# Patient Record
Sex: FEMALE | Race: WHITE | NOT HISPANIC OR LATINO | Employment: FULL TIME | URBAN - METROPOLITAN AREA
[De-identification: names, ages, dates, MRNs, and addresses within clinical notes are randomized per-mention and may not be internally consistent; named-entity substitution may affect disease eponyms.]

---

## 2017-11-03 ENCOUNTER — AMBULATORY - HEALTHEAST (OUTPATIENT)
Dept: MULTI SPECIALTY CLINIC | Facility: CLINIC | Age: 27
End: 2017-11-03

## 2017-11-03 LAB — PAP SMEAR - HIM PATIENT REPORTED: NORMAL

## 2018-05-03 ENCOUNTER — OFFICE VISIT - HEALTHEAST (OUTPATIENT)
Dept: FAMILY MEDICINE | Facility: CLINIC | Age: 28
End: 2018-05-03

## 2018-05-03 DIAGNOSIS — F41.1 GENERALIZED ANXIETY DISORDER: ICD-10-CM

## 2018-05-03 DIAGNOSIS — Z76.89 ESTABLISHING CARE WITH NEW DOCTOR, ENCOUNTER FOR: ICD-10-CM

## 2018-05-03 DIAGNOSIS — K58.0 IRRITABLE BOWEL SYNDROME WITH DIARRHEA: ICD-10-CM

## 2018-05-03 ASSESSMENT — MIFFLIN-ST. JEOR: SCORE: 1533.38

## 2018-05-11 ENCOUNTER — RECORDS - HEALTHEAST (OUTPATIENT)
Dept: GENERAL RADIOLOGY | Facility: CLINIC | Age: 28
End: 2018-05-11

## 2018-05-11 ENCOUNTER — OFFICE VISIT - HEALTHEAST (OUTPATIENT)
Dept: FAMILY MEDICINE | Facility: CLINIC | Age: 28
End: 2018-05-11

## 2018-05-11 ENCOUNTER — COMMUNICATION - HEALTHEAST (OUTPATIENT)
Dept: FAMILY MEDICINE | Facility: CLINIC | Age: 28
End: 2018-05-11

## 2018-05-11 DIAGNOSIS — S99.922A UNSPECIFIED INJURY OF LEFT FOOT, INITIAL ENCOUNTER: ICD-10-CM

## 2018-05-11 DIAGNOSIS — S99.922A FOOT INJURY, LEFT, INITIAL ENCOUNTER: ICD-10-CM

## 2018-06-06 ENCOUNTER — AMBULATORY - HEALTHEAST (OUTPATIENT)
Dept: NURSING | Facility: CLINIC | Age: 28
End: 2018-06-06

## 2018-06-06 ENCOUNTER — COMMUNICATION - HEALTHEAST (OUTPATIENT)
Dept: FAMILY MEDICINE | Facility: CLINIC | Age: 28
End: 2018-06-06

## 2018-06-06 DIAGNOSIS — Z11.1 VISIT FOR TB SKIN TEST: ICD-10-CM

## 2018-06-08 ENCOUNTER — AMBULATORY - HEALTHEAST (OUTPATIENT)
Dept: NURSING | Facility: CLINIC | Age: 28
End: 2018-06-08

## 2018-09-19 ENCOUNTER — RECORDS - HEALTHEAST (OUTPATIENT)
Dept: LAB | Facility: HOSPITAL | Age: 28
End: 2018-09-19

## 2018-09-20 LAB — HBV SURFACE AB SERPL IA-ACNC: POSITIVE M[IU]/ML

## 2018-09-21 LAB — VZV IGG SER QL IA: POSITIVE

## 2018-10-08 ENCOUNTER — OFFICE VISIT - HEALTHEAST (OUTPATIENT)
Dept: FAMILY MEDICINE | Facility: CLINIC | Age: 28
End: 2018-10-08

## 2018-10-08 DIAGNOSIS — F41.1 GENERALIZED ANXIETY DISORDER: ICD-10-CM

## 2018-10-08 DIAGNOSIS — R63.5 WEIGHT GAIN: ICD-10-CM

## 2018-10-08 DIAGNOSIS — F40.243 FEAR OF FLYING: ICD-10-CM

## 2019-02-19 ENCOUNTER — COMMUNICATION - HEALTHEAST (OUTPATIENT)
Dept: FAMILY MEDICINE | Facility: CLINIC | Age: 29
End: 2019-02-19

## 2019-02-24 ENCOUNTER — COMMUNICATION - HEALTHEAST (OUTPATIENT)
Dept: FAMILY MEDICINE | Facility: CLINIC | Age: 29
End: 2019-02-24

## 2019-02-26 ENCOUNTER — COMMUNICATION - HEALTHEAST (OUTPATIENT)
Dept: FAMILY MEDICINE | Facility: CLINIC | Age: 29
End: 2019-02-26

## 2019-02-26 ENCOUNTER — OFFICE VISIT - HEALTHEAST (OUTPATIENT)
Dept: FAMILY MEDICINE | Facility: CLINIC | Age: 29
End: 2019-02-26

## 2019-02-26 DIAGNOSIS — O03.9 MISCARRIAGE: ICD-10-CM

## 2019-02-26 LAB — HCG SERPL-ACNC: 15 MLU/ML (ref 0–4)

## 2019-03-05 ENCOUNTER — AMBULATORY - HEALTHEAST (OUTPATIENT)
Dept: LAB | Facility: HOSPITAL | Age: 29
End: 2019-03-05

## 2019-03-05 DIAGNOSIS — O03.9 MISCARRIAGE: ICD-10-CM

## 2019-03-05 LAB — HCG SERPL-ACNC: <2 MLU/ML (ref 0–4)

## 2019-03-11 ENCOUNTER — OFFICE VISIT - HEALTHEAST (OUTPATIENT)
Dept: FAMILY MEDICINE | Facility: CLINIC | Age: 29
End: 2019-03-11

## 2019-03-11 DIAGNOSIS — O03.9 SPONTANEOUS MISCARRIAGE: ICD-10-CM

## 2019-03-11 LAB — TSH SERPL DL<=0.005 MIU/L-ACNC: 1.91 UIU/ML (ref 0.3–5)

## 2019-03-11 ASSESSMENT — MIFFLIN-ST. JEOR: SCORE: 1641.68

## 2019-04-26 ENCOUNTER — COMMUNICATION - HEALTHEAST (OUTPATIENT)
Dept: FAMILY MEDICINE | Facility: CLINIC | Age: 29
End: 2019-04-26

## 2019-04-26 DIAGNOSIS — F41.1 GENERALIZED ANXIETY DISORDER: ICD-10-CM

## 2019-05-24 ENCOUNTER — COMMUNICATION - HEALTHEAST (OUTPATIENT)
Dept: FAMILY MEDICINE | Facility: CLINIC | Age: 29
End: 2019-05-24

## 2019-05-28 ENCOUNTER — OFFICE VISIT - HEALTHEAST (OUTPATIENT)
Dept: FAMILY MEDICINE | Facility: CLINIC | Age: 29
End: 2019-05-28

## 2019-05-28 DIAGNOSIS — F41.1 GENERALIZED ANXIETY DISORDER: ICD-10-CM

## 2019-05-28 DIAGNOSIS — R03.0 WHITE COAT SYNDROME WITHOUT HYPERTENSION: ICD-10-CM

## 2019-05-28 DIAGNOSIS — N96 HISTORY OF RECURRENT MISCARRIAGES: ICD-10-CM

## 2019-05-28 DIAGNOSIS — N92.0 SPOTTING: ICD-10-CM

## 2019-05-28 DIAGNOSIS — Z87.59 HISTORY OF MISCARRIAGE: ICD-10-CM

## 2019-05-28 DIAGNOSIS — N92.6 IRREGULAR MENSTRUAL CYCLE: ICD-10-CM

## 2019-05-28 LAB
ALBUMIN SERPL-MCNC: 3.9 G/DL (ref 3.5–5)
ALP SERPL-CCNC: 60 U/L (ref 45–120)
ALT SERPL W P-5'-P-CCNC: 18 U/L (ref 0–45)
ANION GAP SERPL CALCULATED.3IONS-SCNC: 9 MMOL/L (ref 5–18)
AST SERPL W P-5'-P-CCNC: 16 U/L (ref 0–40)
BILIRUB SERPL-MCNC: 0.2 MG/DL (ref 0–1)
BUN SERPL-MCNC: 13 MG/DL (ref 8–22)
CALCIUM SERPL-MCNC: 9.8 MG/DL (ref 8.5–10.5)
CHLORIDE BLD-SCNC: 106 MMOL/L (ref 98–107)
CO2 SERPL-SCNC: 24 MMOL/L (ref 22–31)
CREAT SERPL-MCNC: 0.7 MG/DL (ref 0.6–1.1)
GFR SERPL CREATININE-BSD FRML MDRD: >60 ML/MIN/1.73M2
GLUCOSE BLD-MCNC: 88 MG/DL (ref 70–125)
HBA1C MFR BLD: 5.1 % (ref 3.5–6)
HCG UR QL: NEGATIVE
POTASSIUM BLD-SCNC: 4.1 MMOL/L (ref 3.5–5)
PROGEST SERPL-MCNC: 0.1 NG/ML
PROT SERPL-MCNC: 6.8 G/DL (ref 6–8)
SODIUM SERPL-SCNC: 139 MMOL/L (ref 136–145)
TSH SERPL DL<=0.005 MIU/L-ACNC: 1.81 UIU/ML (ref 0.3–5)

## 2019-05-28 ASSESSMENT — MIFFLIN-ST. JEOR: SCORE: 1680.69

## 2019-05-30 LAB
ANA SER QL: 0.7 U
CARDIOLIPIN IGG SER IA-ACNC: 3.6 GPL-U/ML (ref 0–19.9)
CARDIOLIPIN IGM SER IA-ACNC: 0.2 MPL-U/ML (ref 0–19.9)
HCG SERPL-ACNC: <2 MLU/ML (ref 0–4)
PROLACTIN SERPL-MCNC: 13 NG/ML (ref 0–20)

## 2019-07-22 ENCOUNTER — OFFICE VISIT - HEALTHEAST (OUTPATIENT)
Dept: FAMILY MEDICINE | Facility: CLINIC | Age: 29
End: 2019-07-22

## 2019-07-22 DIAGNOSIS — Z32.01 PREGNANCY TEST POSITIVE: ICD-10-CM

## 2019-07-22 DIAGNOSIS — Z87.59 HISTORY OF MISCARRIAGE: ICD-10-CM

## 2019-07-22 LAB — HCG UR QL: POSITIVE

## 2019-07-23 ENCOUNTER — HOSPITAL ENCOUNTER (OUTPATIENT)
Dept: ULTRASOUND IMAGING | Facility: CLINIC | Age: 29
Discharge: HOME | End: 2019-07-23

## 2019-07-23 LAB — HCG SERPL-ACNC: ABNORMAL MLU/ML (ref 0–4)

## 2019-07-25 LAB — HCG-TM SERPL-ACNC: NORMAL IU/L

## 2020-02-07 ENCOUNTER — ANESTHESIA - HEALTHEAST (OUTPATIENT)
Dept: OBGYN | Facility: HOSPITAL | Age: 30
End: 2020-02-07

## 2020-02-12 ENCOUNTER — AMBULATORY - HEALTHEAST (OUTPATIENT)
Dept: PEDIATRICS | Facility: CLINIC | Age: 30
End: 2020-02-12

## 2020-06-27 ENCOUNTER — COMMUNICATION - HEALTHEAST (OUTPATIENT)
Dept: FAMILY MEDICINE | Facility: CLINIC | Age: 30
End: 2020-06-27

## 2020-06-27 DIAGNOSIS — F41.1 GENERALIZED ANXIETY DISORDER: ICD-10-CM

## 2020-07-11 ENCOUNTER — VIRTUAL VISIT (OUTPATIENT)
Dept: FAMILY MEDICINE | Facility: OTHER | Age: 30
End: 2020-07-11

## 2020-07-11 NOTE — PROGRESS NOTES
"Date: 2020 07:20:04  Clinician: Yolanda Nolan  Clinician NPI: [id number]  Patient: Kizzy Chan  Patient : 1990  Patient Address: [address] [place] [id number]  Patient Phone: [phone]  Visit Protocol: URI  Patient Summary:  Kizzy is a 29 year old ( : 1990 ) female who initiated a Visit for COVID-19 (Coronavirus) evaluation and screening. When asked the question \"Please sign me up to receive news, health information and promotions from [employer].\", Kizzy responded \"No\".    Kizzy states her symptoms started 1-2 days ago.   Her symptoms consist of malaise, a headache, chills, myalgia, a cough, and nasal congestion. Kizzy also feels feverish.   Symptom details     Nasal secretions: The color of her mucus is clear.    Cough: Kizzy coughs a few times an hour and her cough is not more bothersome at night. Phlegm does not come into her throat when she coughs. She does not believe her cough is caused by post-nasal drip.     Temperature: Her current temperature is 101.3 degrees Fahrenheit. Kizzy has had a temperature over 100 degrees Fahrenheit for 1-2 days.     Headache: She states the headache is moderate (4-6 on a 10 point pain scale).      Kizzy denies having wheezing, nausea, teeth pain, ageusia, diarrhea, vomiting, rhinitis, ear pain, sore throat, enlarged lymph nodes, anosmia, and facial pain or pressure. She also denies having recent facial or sinus surgery in the past 60 days, taking antibiotic medication in the past month, and having a sinus infection within the past year. She is not experiencing dyspnea.   Precipitating events  She has not recently been exposed to someone with influenza. Kizzy has been in close contact with the following high risk individuals: pregnant women, children under the age of 5, people with asthma, heart disease or diabetes, immunocompromised people, and adults 65 or older.   Pertinent COVID-19 (Coronavirus) information  In " the past 14 days, Kizzy has not worked in a congregate living setting.   She either works or volunteers as a healthcare worker or a , or works or volunteers in a healthcare facility. 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 She lives with a healthcare worker.   Kizzy has had a close contact with a laboratory-confirmed COVID-19 patient within 14 days of symptom onset. She was not exposed at her work. Additional information about contact with COVID-19 (Coronavirus) patient as reported by the patient (free text): Have been in contact with many COVID positive patients   Pertinent medical history  Kizzy does not get yeast infections when she takes antibiotics.   Kizzy needs a return to work/school note.   Weight: 212 lbs   Kizzy does not smoke or use smokeless tobacco.   She denies pregnancy and is breastfeeding. She is currently menstruating.   Weight: 212 lbs    MEDICATIONS: Prozac oral, ALLERGIES: Penicillins, ceftriaxone  Clinician Response:  Dear Kizzy,   Your symptoms show that you may have coronavirus (COVID-19). This illness can cause fever, cough and trouble breathing. Many people get a mild case and get better on their own. Some people can get very sick.  What should I do?  We would like to test you for this virus.   1. Please call [phone] to schedule your visit. Explain that you were referred by [facility] to have a COVID-19 test. Be ready to share your [facility] visit ID number.  The following will serve as your written order for this COVID Test, ordered by me, for the indication of suspected COVID [Z20.828]: The test will be ordered in [employer], our electronic health record, after you are scheduled. It will show as ordered and authorized by Martin Tapia MD.  Order: COVID-19 (Coronavirus) PCR for  "SYMPTOMATIC testing from [facility].    2. When it's time for your COVID test:  Stay at least 6 feet away from others. (If someone will drive you to your test, stay in the backseat, as far away from the  as you can.)   Cover your mouth and nose with a mask, tissue or washcloth.  Go straight to the testing site. Don't make any stops on the way there or back.    3.Starting now: Stay home and away from others (self-isolate) until:   You've had no fever---and no medicine that reduces fever---for 3 full days (72 hours). And...  Your other symptoms have gotten better. For example, your cough or breathing has improved. And...  At least 10 days have passed since your symptoms started.    During this time, don't leave the house except for testing or medical care.   Stay in your own room, even for meals. Use your own bathroom if you can.   Stay away from others in your home. No hugging, kissing or shaking hands. No visitors.  Don't go to work, school or anywhere else.    Clean \"high touch\" surfaces often (doorknobs, counters, handles, etc.). Use a household cleaning spray or wipes. You'll find a full list of  on the EPA website: www.epa.gov/pesticide-registration/list-n-disinfectants-use-against-sars-cov-2.   Cover your mouth and nose with a mask, tissue or washcloth to avoid spreading germs.  Wash your hands and face often. Use soap and water.  Caregivers in these groups are at risk for severe illness due to COVID-19:  o People 65 years and older  o People who live in a nursing home or long-term care facility  o People with chronic disease (lung, heart, cancer, diabetes, kidney, liver, immunologic)  o People who have a weakened immune system, including those who:   Are in cancer treatment  Take medicine that weakens the immune system, such as corticosteroids  Had a bone marrow or organ transplant  Have an immune deficiency  Have poorly controlled HIV or AIDS  Are obese (body mass index of 40 or higher)  Smoke " regularly   o Caregivers should wear gloves while washing dishes, handling laundry and cleaning bedrooms and bathrooms.  o Use caution when washing and drying laundry: Don't shake dirty laundry, and use the warmest water setting that you can.  o For more tips, go to www.cdc.gov/coronavirus/2019-ncov/downloads/10Things.pdf.   4.Sign up for [email or web address]. We know it's scary to hear that you might have COVID-19. We want to track your symptoms to make sure you're okay over the next 2 weeks. Please look for an email from [email or web address]---this is a free, online program that we'll use to keep in touch. To sign up, follow the link in the email. Learn more at [email or web address]  How can I take care of myself?   Get lots of rest. Drink extra fluids(unless a doctor has told you not to).  Take Tylenol (acetaminophen) for fever or pain. If you have liver or kidney problems, ask your family doctor if it's okay to take Tylenol.   Adults can take either:    650 mg (two 325 mg pills) every 4 to 6 hours, or...  1,000 mg (two 500 mg pills) every 8 hours as needed.   Note: Don't take more than 3,000 mg in one day. Acetaminophen is found in many medicines (both prescribed and over-the-counter medicines). Read all labels to be sure you don't take too much.   For children, check the Tylenol bottle for the right dose. The dose is based on the child's age or weight.    If you have other health problems (like cancer, heart failure, an organ transplant or severe kidney disease):Call your specialty clinic if you don't feel better in the next 2 days.    Know when to call 911. Emergency warning signs include:   Trouble breathing or shortness of breath Pain or pressure in the chest that doesn't go away Feeling confused like you haven't felt before, or not being able to wake up Bluish-colored lips or face.  Where can I get more information?    [facility] -- About COVID-19: [email or web address]  CDC -- What to Do If  You're Sick: www.cdc.gov/coronavirus/2019-ncov/about/steps-when-sick.html  CDC -- Ending Home Isolation: www.cdc.gov/coronavirus/2019-ncov/hcp/disposition-in-home-patients.html  [facility] -- Caring for Someone: www.cdc.gov/coronavirus/2019-ncov/if-you-are-sick/care-for-someone.html  [facility] -- Interim Guidance for Hospital Discharge to Home: www.[facility].[place]./diseases/coronavirus/hcp/hospdischarge.pdf  [facility] clinical trials (COVID-19 research studies): clinicalaffairs.[facility]-clinical-trials  Below are the COVID-19 hotlines at the [place] Department of [facility] ([facility]). Interpreters are available.    For health questions: Call 296-225-3404 or 1-909.326.4492 (7 a.m. to 7 p.m.) For questions about schools and childcare: Call [phone] or 1-786.829.6597 (7 a.m. to 7 p.m.)    Diagnosis: Cough  Diagnosis ICD: R05

## 2020-10-17 ENCOUNTER — COMMUNICATION - HEALTHEAST (OUTPATIENT)
Dept: FAMILY MEDICINE | Facility: CLINIC | Age: 30
End: 2020-10-17

## 2020-10-17 DIAGNOSIS — F41.1 GENERALIZED ANXIETY DISORDER: ICD-10-CM

## 2020-11-06 ENCOUNTER — OFFICE VISIT - HEALTHEAST (OUTPATIENT)
Dept: FAMILY MEDICINE | Facility: CLINIC | Age: 30
End: 2020-11-06

## 2020-11-06 ENCOUNTER — COMMUNICATION - HEALTHEAST (OUTPATIENT)
Dept: FAMILY MEDICINE | Facility: CLINIC | Age: 30
End: 2020-11-06

## 2020-11-06 DIAGNOSIS — Z20.822 EXPOSURE TO COVID-19 VIRUS: ICD-10-CM

## 2020-11-06 DIAGNOSIS — Z20.822 SUSPECTED COVID-19 VIRUS INFECTION: ICD-10-CM

## 2020-11-07 ENCOUNTER — RESULTS ONLY (OUTPATIENT)
Dept: LAB | Age: 30
End: 2020-11-07

## 2020-11-07 ENCOUNTER — APPOINTMENT (OUTPATIENT)
Dept: FAMILY MEDICINE | Facility: CLINIC | Age: 30
End: 2020-11-07
Payer: COMMERCIAL

## 2020-11-08 LAB
SARS-COV-2 RNA SPEC QL NAA+PROBE: ABNORMAL
SPECIMEN SOURCE: ABNORMAL

## 2021-01-21 ENCOUNTER — COMMUNICATION - HEALTHEAST (OUTPATIENT)
Dept: FAMILY MEDICINE | Facility: CLINIC | Age: 31
End: 2021-01-21

## 2021-01-21 DIAGNOSIS — F41.1 GENERALIZED ANXIETY DISORDER: ICD-10-CM

## 2021-05-14 ENCOUNTER — HOSPITAL ENCOUNTER (OUTPATIENT)
Dept: OBGYN | Facility: HOSPITAL | Age: 31
Discharge: HOME | End: 2021-05-14
Attending: OBSTETRICS & GYNECOLOGY | Admitting: OBSTETRICS & GYNECOLOGY
Payer: COMMERCIAL

## 2021-05-14 LAB — SARS-COV-2 PCR RESULT-HE - HISTORICAL: NEGATIVE

## 2021-05-15 ENCOUNTER — COMMUNICATION - HEALTHEAST (OUTPATIENT)
Dept: SCHEDULING | Facility: CLINIC | Age: 31
End: 2021-05-15

## 2021-05-17 ENCOUNTER — RECORDS - HEALTHEAST (OUTPATIENT)
Dept: ADMINISTRATIVE | Facility: OTHER | Age: 31
End: 2021-05-17

## 2021-05-17 ENCOUNTER — ANESTHESIA - HEALTHEAST (OUTPATIENT)
Dept: OBGYN | Facility: HOSPITAL | Age: 31
End: 2021-05-17

## 2021-05-28 NOTE — TELEPHONE ENCOUNTER
Refill Approved    Rx renewed per Medication Renewal Policy. Medication was last renewed on 5/3/2018.         Karlos Cage, [facility] Connection Triage/Med Refill 4/26/2019     Requested Prescriptions   Pending Prescriptions Disp Refills     FLUoxetine (PROZAC) 20 MG capsule [Pharmacy Med Name: FLUOXETINE 20MG CAPSULES] 90 capsule 0     Sig: TAKE 1 CAPSULE BY MOUTH DAILY       SSRI Refill Protocol  Passed - 4/26/2019  2:37 PM        Passed - PCP or prescribing provider visit in last year     Last office visit with prescriber/PCP: 10/8/2018 Donna Holcomb DO OR same dept: 3/11/2019 Sabina Amado MD OR same specialty: 3/11/2019 Sabina Amado MD  Last physical: Visit date not found Last MTM visit: Visit date not found   Next visit within 3 mo: Visit date not found  Next physical within 3 mo: Visit date not found  Prescriber OR PCP: Donna Holcomb DO  Last diagnosis associated with med order: There are no diagnoses linked to this encounter.  If protocol passes may refill for 12 months if within 3 months of last provider visit (or a total of 15 months).

## 2021-05-29 NOTE — TELEPHONE ENCOUNTER
Appointment is schedule with Dr Álvarez 5/30/2019, reason for visit states LMP 4/15/19 now have spotting and cramping please advise if needs triage.

## 2021-05-29 NOTE — PROGRESS NOTES
ASSESSMENT & PLAN:  Kizzy was seen today for vaginal bleeding.    Diagnoses and all orders for this visit:    Spotting  -     Pregnancy (Beta-hCG, Qual), Urine    Generalized anxiety disorder    History of miscarriage  -     Thyroid Cascade  -     Progesterone  -     Phospholipid Ab (Cardiolip) IgM/IgG  -     Comprehensive Metabolic Panel  -     Antinuclear Antibody (SHIVAM) Cascade  -     Glycosylated Hemoglobin A1c    History of recurrent miscarriages    White coat syndrome without hypertension    Irregular menstrual cycle  -     Thyroid Cascade  -     Progesterone  -     Phospholipid Ab (Cardiolip) IgM/IgG  -     Comprehensive Metabolic Panel  -     Antinuclear Antibody (SHIVAM) Cascade  -     Glycosylated Hemoglobin A1c         Patient being seen for vaginal spotting with possible missed  early trimester.  Recent history of early missed earlier in the year.  Will obtain a beta hCG.  Her urine is negative.  We discussed ordering the following labs to evaluate for risk factors for recurring early miscarriages including thyroid cascade, progesterone as she may be deficient in this although no clear studies indicate that supplementation would be helpful.  Work-up typically includes also lupus anticoagulant and cardiolipin.  There is no family history of any thrombosis or recurring miscarriages to make a suggest getting a thrombosis panel.  A1c is still stable.  We spent some time discussing diet and nutrition and exercise and weight loss is being helpful for overall health and fitness.  She has gained approximately 30 pounds over the last year so make sense to check a thyroid level.  She is a resident and we discussed how the increased stress can simply make it difficult to maintain pregnancy as well.   We are assessing for diabetes.  No other signs of PCOS.  We discussed if she is not having successful ovulation in the next several months we can do more lab testing including testosterone, DHEAS, prolactin  etc. her  came a normal semen analysis.  If she has another miscarriage I would not hesitate to check clotting factors as well as send her to OB/GYN to consider HSG.  As a mention she could consider doing vaginal or oral progesterone 400 twice daily  We discussed minimizing her sodium intake and watching the caffeine in light of the-whitecoat hypertension.  If she continues to have mild elevations I also would not hesitate to consider an aspirin 81 mg from 12 weeks on. Consider tx htn if diastolic still high 80s.   -discussed continuing prenatal.   -follow up via email or in person in 3-4 mo      Patient Instructions   Clinical guard ovulation strips from [employer]  -give yourself another normal cycle before trying again. -track your periods (length between cycles ) and days that you ovulate  -in 2 months if you want to start again do so, if you havent conceived in 3 months reach out to me and let me know  -watch sodium and caffeine intake in meantime, watch your blood pressures.   -follow up: reach out to me and we can decide on further testing.            Orders Placed This Encounter   Procedures     Pregnancy (Beta-hCG, Qual), Urine     Thyroid Cascade     Progesterone     Phospholipid Ab (Cardiolip) IgM/IgG     Comprehensive Metabolic Panel     Antinuclear Antibody (SHIVAM) Cascade     Glycosylated Hemoglobin A1c     There are no discontinued medications.    No follow-ups on file.     CHIEF COMPLAINT:  Chief Complaint   Patient presents with     Vaginal Bleeding     Possitive test on 5/23, started spotting 5/22, 5/24 started bleeding, starting to get lighter        HISTORY OF PRESENT ILLNESS:  Kizzy is a 28 y.o. female presenting to the clinic today for a possible pregnancy or miscarriage, weight gain, and hypertension.    Pregnancy/Miscarriage:The patient stopped birth control in November to try to be pregnant again. She got pregnant in January. At the end of February, she started spotting one day and next  day she is cramping. In March, she got the confirmation pregnancy from Dr. Álvarez. She tried again and went on birth control. She had her period for 2 weeks straight. She tracks her periods on her phone and would ovulate 2-3 days after Day 16 or 18. She states her periods are irregular. She would have sex every other day leading up to ovulation. She noted that before May 20th her pregnancy test was negative. [facility] were [facility]. 2 of the pregnancies were positive on the .Then on May 6th, there is spotting the week after that she ovulated. She starts the ovulation test on her own. The ovulation strips are expensive, Dr. Holcomb showed her some affordable ones on [employer].The darkest color of her period is on Day 14. Her  did a sperm analysis to investigate more on it but found none out of the ordinary. On May 24th, she was bleeding and had big clots.  When she had the miscarriage all of the pregnancy tests were positive. She ovulated for 4 weeks. There are no FHx of a clotting disorder, PCOS, or diabetes. Her mom's distant cousins had fertility issues and her Aunt just had a miscarriageShe went to the u/s 3 days after the bleeding. On April 15, she had a light period, then on , she was super heavy and had cramping. She notes that she is still bleeding now and the cramp is gone. This is Day 5 of bleeding and is getting lighter to this point. Her LMP was April 15 and her cycle is usually 25-34 days. The patient's residency is manageable and notes that next year would be harder than now. She is taking another pregnancy test a week after today.     Weight Gain: Patient has not been active. She gained 30 lbs in a year. Her  grills vegetables and he has been losing weight but she has not. In the morning she would have 2 boiled eggs and coffee (12oz), then snacks throughout the day and would have lunch and once home she would binge eat on snacks  "because she would feel famished. She has not been consuming sugar. She asked a  Question about metformin.    Hypertension: Her hypertension is high every time she is in the clinic. It is noted that her Diastolic has elevated.        Health Maintenance: There was no history of abnormal papsmear.         REVIEW OF SYSTEMS:   she denies any skin changes heart palpitations, and hair loss.  All other systems are negative.     PFSH:  Past Surgical History:   Procedure Laterality Date     CLOSED REDUCTION DISTAL RADIUS FRACTURE  03/26/2005     COLONOSCOPY  01/09/2017     Her family history includes Anxiety disorder in her brother; Breast cancer in her maternal grandmother; Cancer in her paternal grandfather; Celiac disease in her cousin and another family member; GI problems in her maternal grandfather; Heart disease in her maternal grandfather; Hyperlipidemia in her maternal grandfather; Hypertension in her maternal grandfather; Other in her maternal grandfather, maternal grandmother, and mother; Parkinsonism in her paternal grandmother; Stroke in her paternal grandfather.  She  reports that she has never smoked. She has never used smokeless tobacco.    TOBACCO USE:  Social History     Tobacco Use   Smoking Status Never Smoker   Smokeless Tobacco Never Used        VITALS:  Vitals:    05/28/19 1300   BP: 123/86   Patient Site: Left Arm   Patient Position: Sitting   Cuff Size: Adult Large   Pulse: 63   Temp: 99.1  F (37.3  C)   TempSrc: Oral   Weight: 197 lb 9.6 oz (89.6 kg)   Height: 5' 9\" (1.753 m)     Wt Readings from Last 3 Encounters:   05/28/19 197 lb 9.6 oz (89.6 kg)   03/11/19 189 lb (85.7 kg)   02/26/19 189 lb (85.7 kg)     Body mass index is 29.18 kg/m .  Patient's last menstrual period was 04/15/2019.       MEDICATIONS:  Current Outpatient Medications   Medication Sig Dispense Refill     FLUoxetine (PROZAC) 20 MG capsule TAKE 1 CAPSULE BY MOUTH DAILY 90 capsule 3     LORazepam (ATIVAN) 0.5 MG tablet Take 1 tablet " by mouth as needed for panic attack or travel 10 tablet 0     multivitamin with minerals (THERA-M) 9 mg iron-400 mcg Tab tablet Take 1 tablet by mouth daily.       norethindrone-ethinyl estradiol-iron (MICROGESTIN FE 1.5/30) 1.5 mg-30 mcg (21)/75 mg (7) per tablet Take 1 tablet by mouth daily. 84 tablet 4     No current facility-administered medications for this visit.        PHYSICAL EXAM:  GENERAL:  Reveals an alert 28 y.o. female in NAD.  Vitals:  Per nursing notes.  EYES: PERRLA. Extraocular movements intact. Normal conjunctiva and lids.   ENT:  Hearing grossly normal.  Normal appearance to ears and nose.  Bilateral TM s, external canals, oropharynx normal. Normal lips, gums and teeth.  Normal nasal mucosa, septum and turbinates.  NECK:  Supple, thyroid not palpable.  CARDIAC:  Regular rate and rhythm without murmurs, rubs, or gallops. Legs without edema. Carotids without bruits.   LUNGS: Clear.  Respiratory effort normal.  ABDOMEN:  Soft, non-tender, without hepatosplenomegaly, masses, or hernias.   SKIN:   Without rash, bruise, or palpable lesions.  NEURO:  Cranial nerves II-XII intact.     PSYCH:  Mood appropriate, memory intact.  Slightly tearful    QUALITY MEASURES:  The following are part of a depression follow up plan for the patient:  emotional support management    DATA REVIEWED:  ADDITIONAL HISTORY SUMMARIZED (2): Reviewed irregular menstrual cycle, 5/20/2019.  DECISION TO OBTAIN EXTRA INFORMATION (1): None.   RADIOLOGY TESTS (1): reviewed normal US pelvis feb 2019 (no intrauterine pregnancy)   LABS (1): Ordered Labs Today.   MEDICINE TESTS (1): None.  INDEPENDENT REVIEW (2 each): None.     The visit lasted a total of 30 minutes face to face with the patient. Over 50% of the time was spent counseling and educating the patient about possible pregnancy or miscarriage, weight gain, and hypertension.     Suze LISA, am scribing for and in the presence of Dr. Holcomb.  Dr.Abbott SLOAN, personally  performed the services described in this documentation, as scribed by Suze Maldonado in my presence, and it is both accurate and complete.          Total data points: 4

## 2021-05-29 NOTE — TELEPHONE ENCOUNTER
Who is calling:  Patient scheduled via [facility]  Reason for Call:  Patient scheduled her own appointment via [facility] for an office visit, that may need to be changed to a confirmation of pregnancy appointment.  Clinic please review scheduled appointment and contact patient if necessary to reschedule if needed.   Date of last appointment with primary care: 03.11.19  Okay to leave a detailed message: Yes

## 2021-05-29 NOTE — PATIENT INSTRUCTIONS - HE
Clinical guard ovulation strips from [facility]  -give yourself another normal cycle before trying again. -track your periods (length between cycles ) and days that you ovulate  -in 2 months if you want to start again do so, if you havent conceived in 3 months reach out to me and let me know  -watch sodium and caffeine intake in meantime, watch your blood pressures.   -follow up: reach out to me and we can decide on further testing.

## 2021-05-30 NOTE — PATIENT INSTRUCTIONS - HE
Let me know if you do not hear about your appointment in next 1-2 days    Fermin Estrada MD  7/22/2019

## 2021-05-30 NOTE — PROGRESS NOTES
Assessment:        1. Pregnancy test positive  Pregnancy, Urine    Ambulatory referral to Obstetrics / Gynecology    US OB < 14 Weeks    Beta-hCG Tumor Marker    Beta-hCG, Quantitative   2. History of miscarriage  Beta-hCG, Quantitative        Current medications, medical problems, and pregnancy history were addressed today they relate to the patient s status.      Reviewed previous visit with Dr. Holcomb.  Discussed her labs including low progesterone.  There is a mention of starting progesterone therapy.  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  She is not sure about it.    We will refer her to GYN for options.    At this time we will pursue with ultrasound as dating is not sure.  We will also obtain quantitative beta-hCG to correlate.     Plan:       The following changes were made to patient s medications None  Return in about 2 weeks (around 8/5/2019) for  with GYN.       Subjective:     Chief Complaint   Patient presents with     Follow-up     lab results/verification of pregnancy home test positive/LMP 5/23/19        Kizzy Chan is a 28 y.o. female who presents for evaluation of amenorrhea.  Pregnancy is desired.   Current contraception:  no method  Patient had a pregnancy test:  positive on 07/04/2019 at home  Current symptoms include: breast tenderness, fatigue, morning sickness, nausea and positive home pregnancy test.      No LMP recorded.  The following portions of the patient's history were reviewed and updated as appropriate: allergies, current medications, past family history, past medical history, past social history, past surgical history and problem list.    Patient Active Problem List   Diagnosis     Generalized anxiety disorder     Irritable bowel syndrome with diarrhea     Fear of flying     Disorder of refraction and accommodation     Myopia     Other acne     White coat syndrome without hypertension     History of miscarriage     Current Outpatient  Medications   Medication Sig Dispense Refill     FLUoxetine (PROZAC) 20 MG capsule TAKE 1 CAPSULE BY MOUTH DAILY 90 capsule 3     LORazepam (ATIVAN) 0.5 MG tablet Take 1 tablet by mouth as needed for panic attack or travel 10 tablet 0     multivitamin with minerals (THERA-M) 9 mg iron-400 mcg Tab tablet Take 1 tablet by mouth daily.       No current facility-administered medications for this visit.              Objective:        Vitals:    07/22/19 1630   BP: 134/79   Pulse: 87   Temp: 98.9  F (37.2  C)   TempSrc: Oral   Weight: 203 lb 6.4 oz (92.3 kg)     Physical Exam:  General Appearance: Alert, cooperative, no distress, appears stated age  Heart: Regular rate and rhythm, S1 and S2 normal, no murmur, rub, or gallop, Abdomen: Soft, non-tender, bowel sounds active all four quadrants, no masses, no organomegaly  Lungs: Clear to auscultation bilaterally, respirations unlabored  Abdomen: Soft, non-tender, bowel sounds active all four quadrants,  no masses, no organomegaly  Pelvic:Not examined  Extremities: Extremities normal, atraumatic, no cyanosis or edema  Skin: Skin color, texture, turgor normal, no rashes or lesions    Results for orders placed or performed in visit on 07/22/19   Pregnancy, Urine   Result Value Ref Range    Pregnancy Test, Urine Positive (!) Negative

## 2021-06-01 VITALS — WEIGHT: 169.7 LBS | BODY MASS INDEX: 25.24 KG/M2

## 2021-06-01 VITALS — BODY MASS INDEX: 24.59 KG/M2 | WEIGHT: 166 LBS | HEIGHT: 69 IN

## 2021-06-02 VITALS — WEIGHT: 189 LBS | BODY MASS INDEX: 27.99 KG/M2 | HEIGHT: 69 IN

## 2021-06-02 VITALS — BODY MASS INDEX: 27.58 KG/M2 | WEIGHT: 185.4 LBS

## 2021-06-02 VITALS — BODY MASS INDEX: 27.91 KG/M2 | WEIGHT: 189 LBS

## 2021-06-03 VITALS — HEIGHT: 69 IN | WEIGHT: 197.6 LBS | BODY MASS INDEX: 29.27 KG/M2

## 2021-06-03 VITALS — WEIGHT: 203.4 LBS | BODY MASS INDEX: 30.04 KG/M2

## 2021-06-05 NOTE — ANESTHESIA POSTPROCEDURE EVALUATION
Patient: Kizzy Chan  Anesthesia type: epidural    Patient location: Labor and Delivery  Last vitals:   Pulse Readings from Last 1 Encounters:   02/09/20 74     SpO2 Readings from Last 1 Encounters:   02/09/20 98%     BP Readings from Last 1 Encounters:   02/09/20 147/75     Resp Readings from Last 1 Encounters:   02/09/20 15     Temp Readings from Last 1 Encounters:   02/09/20 37.1  C (98.7  F) (Oral)       Post vital signs: stable  Level of consciousness: awake and responds to simple questions  Post-anesthesia pain: pain controlled  Post-anesthesia nausea and vomiting: no  Pulmonary: unassisted, return to baseline  Cardiovascular: stable and blood pressure at baseline  Hydration: adequate  Anesthetic events: no    QCDR Measures:  ASA# 11 - Aminta-op Cardiac Arrest: ASA11B - Patient did NOT experience unanticipated cardiac arrest  ASA# 12 - Aminta-op Mortality Rate: ASA12B - Patient did NOT die  ASA# 13 - PACU Re-Intubation Rate: NA - No ETT / LMA used for case  ASA# 10 - Composite Anes Safety: ASA10A - No serious adverse event    Additional Notes:

## 2021-06-05 NOTE — ANESTHESIA PREPROCEDURE EVALUATION
Anesthesia Evaluation      Patient summary reviewed   No history of anesthetic complications     Airway   Mallampati: II   Pulmonary - normal exam   (+) asthma                           Cardiovascular - normal exam  (+) hypertension, ,      Neuro/Psych    (+) anxiety/panic attacks,     Endo/Other    (+) pregnant     GI/Hepatic/Renal - negative ROS      Other findings: H/o gestational HTN, IBS with diarrhea.       Results for PRITESH GREENE TABATHA MONTAÑO (MRN [id number]) as of 2/7/2020 2/6/2020 16:10  Creatinine: 0.70  GFR MDRD Af Amer: >60  GFR MDRD Non Af Amer: >60  AST: 18  ALT: 21  Uric Acid: 6.2  INR: 0.96  PTT: 26  WBC: 11.1 (H)  RBC: 4.19  Hemoglobin: 12.6  Hematocrit: 37.3  MCV: 89  MCH: 30.1  MCHC: 33.8  RDW: 12.5  Platelets: 211  MPV: 12.1  ABORh: O POS  Antibody Screen: Negative        Dental                         Anesthesia Plan  Planned anesthetic: epidural    ASA 2     Anesthetic plan and risks discussed with: patient    Post-op plan: routine recovery

## 2021-06-05 NOTE — ANESTHESIA PROCEDURE NOTES
Epidural Block    Patient location during procedure: OB  Time Called: 2/7/2020 8:36 AM  Reason for Block:labor epidural  Staffing:  Performing  Anesthesiologist: Andre Gutierrez MD  Preanesthetic Checklist  Completed: patient identified, risks, benefits, and alternatives discussed, timeout performed, consent obtained, airway assessed, oxygen available, suction available, emergency drugs available and hand hygiene performed  Procedure  Patient position: right lateral decubitus  Prep: ChloraPrep  Patient monitoring: continuous pulse oximetry, heart rate and blood pressure  Approach: midline  Location: L2-L3  Epidural technique: KATHERYN to local.  Number of Attempts:1  Needle  Needle type: Ann   Needle gauge: 18 G     Catheter in Space: 5  Assessment  Sensory level:  No complicationsSensory level: not assessed at this time.

## 2021-06-06 NOTE — PROGRESS NOTES
"[facility] Lactation Visit     Assessment:     1.  difficulty in feeding at breast         Rickey has had excellent growth since hospital discharge and is now 1% above his birth weight. He has primarily been fed by bottle because of difficulties with latching.      In the office today he was able to latch well to both breasts with mom holding her breast in a deep \"sandwich\" hold and had a strong suck for 10 minutes per side. A nipple shield was not needed today. At times he was too frustrated to latch and so parents fed him a small amount of formula before attempting to latch him again. Few swallows were heard while he was at the breast. He was able to transfer 0.1 oz from the L side. Transfer was not evaluated on the R side, but I would estimate that he transferred less than 5 mls. This is less than I would expect for a 5 day infant.      Mom currently has a low milk supply and is able to pump up to 10 mls at a time. She is currently using a hospital grade pump and is pumping whenever Rickey eats, at least 8 times per day. She is currently being treated for hypertension and is taking labetalol; discussed that this medication in addition to hypertension can sometimes reduce milk supply or delay the onset of milk production.      Rickey and Kizzy will follow up with Maryann Sarmiento CNM, for follow up lactation visit in one week.      Plan:     Continue to breastfeed on demand, at least 8-12 times a day.      If he latches well to the breast right away, great. If not, try giving him a small amount of formula first then re-latch him.      Offer both sides every time, and alternate which breast you start on. Latch baby deeply by making a \"breast sandwich,\" and aim your nipple for the roof of the mouth. If baby's lips are rolled inward, flip the top lip out with your finger, and then apply gentle downward pressure to the chin to help the lips flange out like \"fish lips.\" If you have pain that lasts beyond " the initial latch-on, always restart. When sucking/swallowing frequency starts to slow down, do breast compressions/massage and tickle baby's feet to keep him alert with feeding. A diaper change between sides can be helpful to keep him alert.     Supplementation plan: Continue to supplement with expressed breast milk or formula as he cues. See chart below for typical intake by age       Recommended to pump: Whenever he eats as you are able. Balance this with other priorities like resting, sleeping, eating.  Continue to monitor output, expect at least 6 wet diapers per day.      Recommended Vitamin D 400 IU daily.         Follow up in 1 week with Maryann Sarmiento        Average Infant Milk Intake by Age     Age Average milk volume per feeding (mL) Average milk volume per feeding (oz) Average 24 hour milk intake (mL) Average 24 hour milk intake (oz)   Day 1 Few drops - 5mL < tsp Up to 30 mL Up to 1 oz   Day 2 5 - 15 mL <0.5 oz - 1 TB 30 - 120 mL 1 - 4 oz   Day 3 15 - 30 mL  0.5 - 1 oz 120 - 240 mL 4 - 8 oz   Day 4 30 - 45 mL  1 - 1.5 oz 240 - 360 mL 8 - 12 oz   Day 5-7 45 - 60 mL 1.5 - 2 oz 360 - 600 mL 12 - 18 oz   Week 2-3 60 - 90 mL 2 - 3 oz 450 - 750 mL 15 - 25 oz   Months 1-6 90 - 150 mL 3 - 5 oz 750 - 1035 mL 25 - 35 oz         -------------------------------------------------------------------------------------------------  Information for breastfeeding families on Increasing breastmilk supply      Frequent stimulation of the breasts, by breastfeeding or by using a breast pump, during the first few days and weeks, is essential to establish an abundant breastmilk supply. If you find your milk supply is low, try the following recommendations. If you are consistent you will likely see an improvement within a few days. Although it may take a month or more to bring your supply up to meet your baby's needs, you will see steady, gradual improvement. You will be glad that you put the time and effort into breastfeeding and so  "will your baby.      More breast stimulation    Breastfeed more often, at least 8-12 times per 24 hours.     Discontinue the use of a pacifier (so that when the baby wants to suck, they are stimulating the breasts for milk production)    Try to get in \"one more feeding\" before you go to sleep, even if you have to wake the baby.    Offer both breasts at each feeding    \"Burp and switch\" using each breast twice or three times, and using different positions    \"Top up feeds\" give a short feeding in 10-20 minutes if baby seems hungry    Empty your breasts well by massaging while the baby is feeding    Assure the baby is completely emptying your breasts at each feeding    Try breast compression - pushing milk to baby during a feeding     Avoid these things that are known to reduce breastmilk supply    Smoking    Caffeine    Birth control pills and injections    Decongestants, antihistamines    Severe weight loss diets    Mints, parsmartha, jatinder in excessive amounts     Use a breast pump    Consider use of a hospital grade breast pump with a double kit    Pump after feedings or between feedings    Rest 10-15 minutes prior to pumping, eat and drink something    Apply warmth to your breasts and massage before beginning to pump    Try \"power pumping\". Pumping 12 x a day for 2-3 days after a feeding, even for a short time. Try pumping for 10min, resting for 10 min, pumping 10 min etc for an hour a few times a day.      Condition your let-down reflex    Play relaxing music    Imagine your baby, look at pictures of your baby, smell baby clothing or baby powder    Watch videos of your baby    Always pump in the same quiet, relaxed place, set up a routine    Do slow, deep, relaxed breathing, relax your shoulders     Mother care    Reduce stress and activity, get help    Increase fluid intake    Eat nutritious meals, continue to take prenatal vitamins    Back rubs stimulate nerves that serve the breasts (central part of the " "spine)    Increase skin-to-skin holding time with your baby, relax together    Take a warm, bath, read,meditate, and empty your mind of tasks that need to be done     Herbs, food and medications    Eat a bowl of cooked oatmeal daily    Tom's yeast 3 Tablespoons daily, increase by 1/2 teaspoon daily until results are seen    GoLacta contains the active ingredient \"Moringa\" or \"Malunggay.\" These are superfoods than can be helpful in increasing milk supply. This herb is available through other hogan as well.     Goat's Rue is an herbal remedy intended to help increase the glandular tissue in women's breasts. This can be a powerful galactogogue (substance to increase milk supply).     Fenugreek preparations can help some increase supply, though anecdotally others have found that it does not help their supply or even decreases supply. Use of this herb has not been formally studied. Doses of 3-5 capsules (580-610 mg) three times per day are commonly recommended. Avoid fenugreek if you are diabetic, hypoglycemic, asthmatic or allergic to peanuts or other legumes or beans. Fenugreek is available at most vitamin shops or health food stores. Taken as directed, it may cause a faint maple body odor. That is to be expected and means that the herb is doing it's job. To read more about fenugreek, go to http://www.breastfeeding.com/all_about/all_about_fenugreek.html    Blessed thistle or other herbs or beverages such as Mother's Milk Tea taken as directed on the package. A reliable sources of herbs and herbal blends is Mother Love Herbals and Eleonora Herbs.    Lactation cookies. By searching the internet and you will find sources for packaged cookies and recipes to make your own.     Prescription medication sometimes help increase milk supply. Metaclopromide (Reglan) has been used with limited success. Domperidone has been used with more success, but is not FDA approved in the [place].      Keep records    It is important to keep a " daily log with the number of pumping sessions, amount obtained amount you are having to supplement your baby and 24 hour totals, this amount is more important that the pumped amount at each session. This will help you see your progress over the days.     Keep in touch with your health care provider so he/she can monitor your progress over the days and modify advice if necessary.      Retained placenta  If you are not seeing improvement and you are having any heavy bleeding, discuss the possibility of retained placental fragments with your MD. Small bits of the placenta can secrete enough hormones to prevent the milk from coming in.     Low thyroid  Have you health care provider check your thyroid levels. Low thyroid can affect ilk supply. If you have been taking thyroid medication, have your levels checked after delivery, you may need your medication adjusted.      Other resources: http://www.lowmilksupply.org     [place] Hand Expression Video http://[email or web address]/Breastfeeding/HandExpression.[email or web address]      Maximizing Milk Production Video; [email or web address]/MaxProduction.htm                        SUBJECTIVE:      Rickey is here today with mom, Kizzy, and, and dad, Lc, for lactation support. He is a 5 days male born at Gestational Age: 37w1d now 5 days.    He is doing well. He has gained 4.5 oz since last visit 3 days ago. He has gained approximately 1.5 oz per day over the past 3 days and is now 1% from birth weight.   .     Baby is attempting nursing about 4 times per day for about 1-2 minutes per session. Most of the time he has not been able to latch on or has latched very briefly.    Mother reports not hearing audible swallows.   Baby feeds about 8 times in 24 hours.   Baby is supplemented with expressed breast milk or formula, about 40 ml after feeds (approximately 8-10 times per day).   Mom is also pumping about 8+ times per day with a hospital grade and gets about 10 mls  per pumping session.  Number of wet diapers in 24 hours: 8+  Number of stools in 24 hours: 8+  Color and consistency of stools: yellow seedy  Mom noticed her breasts grew larger and areolas darkened during pregnancy and she has not yet noticed primary engorgement when her milk came in.      Breastfeeding Goals: Breastfeeding for 6 months - hoping to build a supply ahead of       Previous Breastfeeding Experience: First baby  Breast-surgery: none  Maternal medications: Labetalol, Prozac, pepsin, colace       Hospital course:  Hypoglycemia requiring supplementation           Results for orders placed or performed during the hospital encounter of 02/07/20   POCT Glucose - 90 minutes of life   Result Value Ref Range     Glucose 46 (L) 51 - 139 mg/dL   POCT Glucose   Result Value Ref Range     Glucose 36 (LL) 51 - 139 mg/dL   POCT Glucose   Result Value Ref Range     Glucose 59 51 - 139 mg/dL   POCT Glucose   Result Value Ref Range     Glucose 51 51 - 139 mg/dL   POCT Glucose   Result Value Ref Range     Glucose 54 51 - 139 mg/dL   POCT Glucose   Result Value Ref Range     Glucose 41 (L) 51 - 139 mg/dL   POCT Glucose   Result Value Ref Range     Glucose 70 51 - 139 mg/dL   POCT Glucose   Result Value Ref Range     Glucose 72 51 - 139 mg/dL   POCT Glucose   Result Value Ref Range     Glucose 63 51 - 139 mg/dL   POCT Glucose   Result Value Ref Range     Glucose 56 51 - 139 mg/dL   POCT Glucose   Result Value Ref Range     Glucose 36 (LL) 51 - 139 mg/dL   POCT Glucose   Result Value Ref Range     Glucose 56 51 - 139 mg/dL   POCT Glucose   Result Value Ref Range     Glucose 57 51 - 139 mg/dL   POCT Glucose   Result Value Ref Range     Glucose 67 51 - 139 mg/dL         Current Outpatient Medications:      white petrolatum (VASELINE) Oint, Apply liberally to circumcision site with each diaper change., Disp: , Rfl: 0     acetaminophen (TYLENOL) 40 mg/1.25 mL solution, Take 1.25 mL (40 mg total) by mouth every 6 (six)  hours as needed for fever (irritability)., Disp: , Rfl: 0  History reviewed. No pertinent past medical history.  History reviewed. No pertinent surgical history.        Family History   Problem Relation Age of Onset     Other Maternal Grandmother           Ophthalmic Disease (Copied from mother's family history at birth)     Asthma Mother           Copied from mother's history at birth     Hypertension Mother           Copied from mother's history at birth     Mental illness Mother           Copied from mother's history at birth            Primary care provider: Donna Holcomb DO     OBJECTIVE:     Mother:   Nipples are everted, the areola is compressible, the breast is soft.       Sore nipples: None   Maternal depression screening: Doing well  EPDS: Referral to maternal PCP not made     Infant:      Age today: 5 days     There were no vitals filed for this visit.        Weight:       Wt Readings from Last 3 Encounters:   02/12/20 5 lb 11.5 oz (2.594 kg) (2 %, Z= -2.04)*   02/11/20 5 lb 9 oz (2.523 kg) (2 %, Z= -2.14)*   02/09/20 5 lb 7 oz (2.466 kg) (2 %, Z= -2.13)*      * Growth percentiles are based on WHO (Boys, 0-2 years) data.         Birthweight:  5 lb 11 oz (2.58 kg).   Today's weight:    Vitals       Vitals:     02/12/20 1500   Weight: 5 lb 11.5 oz (2.594 kg)      . This is 1% from birth weight.      Test weights:     LEFT side: 0.1 oz  RIGHT side: Not evaluated - likely 0.1 oz or less      TOTAL transfer:  0.1 oz         Feeding assessment:      Digital suck assessment:  Infant draws consultant's finger into mouth, palate intact, tongue over gums, normal frenulum.      Baby can hold suction with tongue while at the breast.      Alignment: Baby's head was aligned with its trunk. Baby did face mother. Baby was in foot ball and cross cradle positions today.      Areolar Grasp: Baby was able to open mouth wide. Baby's lips were not pursed. Baby's lips did flange outward. Tongue was visible just barely  over bottom lip. Baby had complete seal.      Areolar Compression: Baby made rhythmic motion. There were no clicking or smacking sounds. There was no severe nipple discomfort.  Nipples appeared round after feeding.     Audible swallowing: Baby did not make quiet sounds of swallowing: There was not an increase in frequency after milk ejection reflex. Milk supply appears low at this time.      PHYSICAL EXAM     Gen: Alert, no acute distress.   Head: Anterior fontanelle flat and soft.   Mouth:Lips pink. Oral mucosa moist. Tongue midline (good lateralization, movement, and lift; able to extend pass lower gumline).  Palate intact. Coordinated suck.  Lungs: Clear to auscultation bilaterally.   Cardiac: Regular regular rate and rhythm, S1S2, no murmurs.  Abdomen: Soft, nontender, bowel sounds present, no hepatosplenomegaly or mass palpable. Umbilicus dry with no erythema or drainage.   : Gene stage 1 male genitalia  Skin: Intact, dry, appropriate coloring for ethnicity, no jaundice.   Neuro: Appropriate muscle tone.     The visit lasted a total of 50 minutes that I spent face to face with the patient. Of that time, 50 minutes were spent on lactation. Over 50% of the time was spent counseling and educating the patient about feeding difficulties and lactation concerns.      Completed by:   COLLEEN Page, IBCLC, [facility], Pediatrics.  2/12/2020 11:07 AM

## 2021-06-09 NOTE — TELEPHONE ENCOUNTER
Refill Approved    Rx renewed per Medication Renewal Policy. Medication was last renewed on 4/26/19.    Aurea Villaseñor, [facility] Connection Triage/Med Refill 6/29/2020     Requested Prescriptions   Pending Prescriptions Disp Refills     FLUoxetine (PROZAC) 20 MG capsule [Pharmacy Med Name: FLUOXETINE 20MG CAPSULES] 90 capsule 3     Sig: TAKE ONE CAPSULE BY MOUTH DAILY       SSRI Refill Protocol  Passed - 6/27/2020  3:28 AM        Passed - PCP or prescribing provider visit in last year     Last office visit with prescriber/PCP: 5/28/2019 Donna Holcomb DO OR same dept: 7/22/2019 Fermin Estrada MD OR same specialty: 7/22/2019 Fermin Estrada MD  Last physical: Visit date not found Last MTM visit: Visit date not found   Next visit within 3 mo: Visit date not found  Next physical within 3 mo: Visit date not found  Prescriber OR PCP: Donna Holcomb DO  Last diagnosis associated with med order: 1. Generalized anxiety disorder  - FLUoxetine (PROZAC) 20 MG capsule [Pharmacy Med Name: FLUOXETINE 20MG CAPSULES]; TAKE ONE CAPSULE BY MOUTH DAILY  Dispense: 90 capsule; Refill: 3    If protocol passes may refill for 12 months if within 3 months of last provider visit (or a total of 15 months).

## 2021-06-12 NOTE — PROGRESS NOTES
Assessment:   The encounter diagnosis was Exposure to COVID-19 virus.     Plan:   No medications were ordered this encounter    There are no Patient Instructions on file for this visit.  No follow-ups on file.    Subjective:   Kizzy Chan is a 30 y.o. female who submitted an eVisit request for evaluation of her Covid Concern.  See the questionnaire and message section of encounter report for information related to history of present illness and review of systems.    The following portions of the patient's history were reviewed and updated as appropriate:  She does not have any pertinent problems on file.  She is allergic to ceftriaxone; nickel; and penicillins..     Objective:   No exam performed today, patient submitted as eVisit.

## 2021-06-12 NOTE — TELEPHONE ENCOUNTER
RN cannot approve Refill Request    RN can NOT refill this medication PCP messaged that patient is overdue for Office Visit. Last office visit: 5/28/2019 Donna Holcomb DO Last Physical: Visit date not found Last MTM visit: Visit date not found Last visit same specialty: 7/22/2019 Fermin Estrada MD.  Next visit within 3 mo: Visit date not found  Next physical within 3 mo: Visit date not found      Madelyn Galdamez, [facility] Triage/Med Refill 10/18/2020    Requested Prescriptions   Pending Prescriptions Disp Refills     FLUoxetine (PROZAC) 20 MG capsule 90 capsule 0     Sig: Take 1 capsule (20 mg total) by mouth daily.       SSRI Refill Protocol  Failed - 10/17/2020  3:41 PM        Failed - PCP or prescribing provider visit in last year     Last office visit with prescriber/PCP: 5/28/2019 Donna Holcomb DO OR same dept: Visit date not found OR same specialty: 7/22/2019 Fermin Estrada MD  Last physical: Visit date not found Last MTM visit: Visit date not found   Next visit within 3 mo: Visit date not found  Next physical within 3 mo: Visit date not found  Prescriber OR PCP: Donna Holcomb DO  Last diagnosis associated with med order: 1. Generalized anxiety disorder  - FLUoxetine (PROZAC) 20 MG capsule; Take 1 capsule (20 mg total) by mouth daily.  Dispense: 90 capsule; Refill: 0    If protocol passes may refill for 12 months if within 3 months of last provider visit (or a total of 15 months).

## 2021-06-12 NOTE — PATIENT INSTRUCTIONS - HE
"  Dear Kizzy Chan,    Your symptoms show that you may have coronavirus (COVID-19). This illness can cause fever, cough and trouble breathing. Many people get a mild case and get better on their own. Some people can get very sick.    Will I be tested for COVID-19?  We would like to test you for this virus. I have placed an order for this test and please call [phone] to schedule testing. [employer] employees please call [phone].  [employer] employees call [phone].     When it's time for your COVID test:  Stay at least 6 feet away from others. (If someone will drive you to your test, stay in the backseat, as far away from the  as you can.)  Cover your mouth and nose with a mask, tissue or washcloth.  Go straight to the testing site. Don't make any stops on the way there or back.    Starting now:     Do not go to work. Follow your usual processes for taking time away from work.  o If you receive a negative COVID-19 test result and were NOT exposed to someone with a known positive COVID-19 test, you can return to work once you're free of fever for 24 hours without fever-reducing medication and your symptoms are improving or resolved.  o If you receive a positive COVID-19 test result, you must be cleared by Employee Occupational Health and Safety to return to work.   o If you were exposed to someone who has tested positive for COVID-19, you can return to work 14 days after your last contact with the positive individual, provided you do not have symptoms at all during that time. In some cases, your manager may ask you to come back sooner than 14 days.     During this time, don't leave the house except for testing or medical care.  o Stay in your own room, even for meals. Use your own bathroom if you can.  o Stay away from others in your home. No hugging, kissing or shaking hands. No visitors.  o Don't go to work, school or anywhere else.    Clean \"high touch\" surfaces often " (doorknobs, counters, handles, etc.). Use a household cleaning spray or wipes. You'll find a full list of  on the EPA website: www.epa.gov/pesticide-registration/list-n-disinfectants-use-against-sars-cov-2.    Cover your mouth and nose with a mask, tissue or washcloth to avoid spreading germs.    Wash your hands and face often. Use soap and water.    People in these groups are at risk for severe illness due to COVID-19:  o People 65 years and older  o People who live in a nursing home or long-term care facility  o People with chronic disease (lung, heart, cancer, diabetes, kidney, liver, immunologic)  o People who have a weakened immune system, including those who:  - Are in cancer treatment  - Take medicine that weakens the immune system, such as corticosteroids  - Had a bone marrow or organ transplant  - Have an immune deficiency  - Have poorly controlled HIV or AIDS  - Are obese (body mass index of 40 or higher)  - Smoke regularly      Caregivers should wear gloves while washing dishes, handling laundry and cleaning bedrooms and bathrooms.    Use caution when washing and drying laundry: Don't shake dirty laundry, and use the warmest water setting that you can.    For more tips, go to www.cdc.gov/coronavirus/2019-ncov/downloads/10Things.pdf.    Sign up for [employer]. We know it's scary to hear that you might have COVID-19. We want to track your symptoms to make sure you're okay over the next 2 weeks. Please look for an email from [employer]--this is a free, online program that we'll use to keep in touch. To sign up, follow the link in the email you will receive. Learn more at [email or web address]    How can I take care of myself?    Get lots of rest. Drink extra fluids (unless a doctor has told you not to)    Take Tylenol (acetaminophen) for fever or pain. If you have liver or kidney problems, ask your family doctor if it's okay to take Tylenol.  Adults can take either:    650 mg (two 325  mg pills) every 4 to 6 hours, or     1,000 mg (two 500 mg pills) every 8 hours as needed.    Note: Don't take more than 3,000 mg in one day. Acetaminophen is found in many medicines (both prescribed and over-the-counter medicines). Read all labels to be sure you don't take too much.  For children, check the Tylenol bottle for the right dose. The dose is based on the child's age or weight.    If you have other health problems (like cancer, heart failure, an organ transplant or severe kidney disease): Call your specialty clinic if you don't feel better in the next 2 days.    Know when to call 911. Emergency warning signs include:  Trouble breathing or shortness of breath  Pain or pressure in the chest that doesn't go away  Feeling confused like you haven't felt before, or not being able to wake up  Bluish-colored lips or face    Where can I get more information?    [facility] - About COVID-19: [email or web address]  CDC - What to Do If You're Sick: www.cdc.gov/coronavirus/2019-ncov/about/steps-when-sick.html

## 2021-06-14 NOTE — TELEPHONE ENCOUNTER
RN cannot approve Refill Request    RN can NOT refill this medication Protocol failed and NO refill given. Last office visit: 5/28/2019 Donna Holcomb DO Last Physical: Visit date not found Last MTM visit: Visit date not found Last visit same specialty: 7/22/2019 Fermin sEtrada MD.  Next visit within 3 mo: Visit date not found  Next physical within 3 mo: Visit date not found      Aurea Villaseñor, [facility] Triage/Med Refill 1/21/2021    Requested Prescriptions   Pending Prescriptions Disp Refills     FLUoxetine (PROZAC) 20 MG capsule [Pharmacy Med Name: FLUOXETINE 20MG CAPSULES] 90 capsule 0     Sig: TAKE 1 CAPSULE(20 MG) BY MOUTH DAILY       SSRI Refill Protocol  Failed - 1/21/2021  3:29 AM        Failed - PCP or prescribing provider visit in last year     Last office visit with prescriber/PCP: 5/28/2019 Donna Holcomb DO OR same dept: Visit date not found OR same specialty: 7/22/2019 Fermin Estrada MD  Last physical: Visit date not found Last MTM visit: Visit date not found   Next visit within 3 mo: Visit date not found  Next physical within 3 mo: Visit date not found  Prescriber OR PCP: Donna Holcomb DO  Last diagnosis associated with med order: 1. Generalized anxiety disorder  - FLUoxetine (PROZAC) 20 MG capsule [Pharmacy Med Name: FLUOXETINE 20MG CAPSULES]; TAKE 1 CAPSULE(20 MG) BY MOUTH DAILY  Dispense: 90 capsule; Refill: 0    If protocol passes may refill for 12 months if within 3 months of last provider visit (or a total of 15 months).

## 2021-06-16 PROBLEM — F40.243 FEAR OF FLYING: Status: ACTIVE | Noted: 2018-10-08

## 2021-06-16 PROBLEM — F41.1 GENERALIZED ANXIETY DISORDER: Status: ACTIVE | Noted: 2017-01-12

## 2021-06-16 PROBLEM — Z87.59 HISTORY OF MISCARRIAGE: Status: ACTIVE | Noted: 2019-05-28

## 2021-06-16 PROBLEM — R03.0 WHITE COAT SYNDROME WITHOUT HYPERTENSION: Status: ACTIVE | Noted: 2017-04-12

## 2021-06-16 PROBLEM — Z34.90 PREGNANT: Status: ACTIVE | Noted: 2020-02-06

## 2021-06-17 NOTE — PROGRESS NOTES
Please call patient:    Formal radiology review says there's linear lucency at base of 5th metatarsal could be nondisplaced fracture. Dr. Zhou has discussed this with you during visit. Make sure you wear postop shoe. F/u in 2-3 weeks for repeat XR  Please send result letter    Sabina zhou.

## 2021-06-17 NOTE — PROGRESS NOTES
ASSESSMENT/ PLAN    1. Foot injury, left, initial encounter  Occur last night. She rolled onto her lateral left ankle. Exam remarkable for tenderness to palpatation of base of left 5th metatarsal and swelling. XR obtained in clinic and per my review there's possible linear lucency across the base of 5th metatarsal could be nondisplaced fracture. Gave patient post op shoe. RTC in 2-3 weeks for repeat XR and follow up. Patient is getting  in 2 weeks. Advised ice/heat, OTC tylenol/advil for swelling/pain and consistent use of post-op shoe. Patient will be called with my recommendation as radiology report that shows possible non-displaced fracture came after patient already left clinic.   - XR Foot Left 3 or More VWS; Future  - XR Ankle Left 3 or More VWS; Future  - Shoe post op female    This note was created using Dragon dictation software, spelling errors may occur.     Sabina Amado MD        SUBJECTIVE   Kizzy Marcos is a 27 y.o. old female with a past medical history of generalized anxiety disorder, irritable bowel syndrome diarrhea predominant who presented to clinic today for further evaluation of left ankle injury.  She reports that she rolled her left foot stepping out from her basement into a garage last night around 6 PM and her left foot rolled outward and she heard a crack. She's been limping around. It hurts to bear weight. Pain is located at the base of the 5th toe. She denies previous foot injury. She used to roll her left foot all the time in high school playing basketball.     Review of Systems:  Negative except as noted in HPI    The following portions of the patient's history were reviewed and updated as appropriate: past medical history, past surgical history, family history, allergies, current medications and problem list.    Medical History  Active Ambulatory (Non-Hospital) Problems    Diagnosis     Generalized anxiety disorder     Irritable bowel syndrome with diarrhea     No past  medical history on file.    Surgical History  She  has a past surgical history that includes Colonoscopy (01/09/2017) and Closed reduction distal radius fracture (03/26/2005).    Social History  Reviewed, and she  reports that she has never smoked. She has never used smokeless tobacco.    Family History  Reviewed, and family history includes Anxiety disorder in her brother; Breast cancer in her maternal grandmother; Cancer in her paternal grandfather; Celiac disease in her cousin and other; GI problems in her maternal grandfather; Heart disease in her maternal grandfather; Hyperlipidemia in her maternal grandfather; Hypertension in her maternal grandfather; Other in her maternal grandfather, maternal grandmother, and mother; Parkinsonism in her paternal grandmother; Stroke in her paternal grandfather.    Medications  Reviewed and reconciled    Allergies  Allergies   Allergen Reactions     Ceftriaxone Rash     Had a rash possibly from this injection 9-09     Nickel Rash     Penicillins Rash         OBJECTIVE  Physical Exam:  Vital signs: /88 (Patient Site: Right Arm, Patient Position: Sitting, Cuff Size: Adult Regular)  Pulse 68  Temp 99.3  F (37.4  C) (Oral)   Resp 16  Wt 169 lb 11.2 oz (77 kg)  LMP 05/06/2018  BMI 25.24 kg/m2  Weight: 169 lb 11.2 oz (77 kg)    General appearance: pleasant, appears stated age, cooperative and in no distress  Musculoskeletal: warm and well perfused, strong and symmetric dorsalis pedal pulses, strength 5/5 and equal bilaterally  Left Ankle: skin without erythema or ecchymosis, mild to moderate effusion over the base of 5th metatarsal. Non-tender to palpation over posterior aspect of medial and lateral malleolus but exquisitely tender over the base of the 5th metarsal and navicular. Able to bear weight for four steps. Non-tender to palpation over ATFL, CFL, PTFL, deltoid ligament and tibia-fibula syndesmosis. Full ROM. Compression test normal. Anterior drawer normal. Talar  tilt normal. CMS intact.   Skin: no rashes  Neuro: alert oriented x 3, grossly normal otherwise  Psych: normal affect, appropriate conversation

## 2021-06-17 NOTE — ANESTHESIA PROCEDURE NOTES
Epidural Block    Patient location during procedure: OB  Time Called: 5/17/2021 4:18 PM  Reason for Block:labor epidural  Staffing:  Performing  Anesthesiologist: Felicia Oliva MD  Preanesthetic Checklist  Completed: patient identified, risks, benefits, and alternatives discussed, timeout performed, consent obtained, at patient's request, airway assessed, oxygen available, suction available, emergency drugs available and hand hygiene performed  Procedure  Patient position: sitting  Prep: ChloraPrep and site prepped and draped  Patient monitoring: continuous pulse oximetry, heart rate and blood pressure  Approach: midline  Location: L3-L4  Injection technique: KATHERYN saline  Number of Attempts:1  Needle  Needle type: Ann   Needle gauge: 18 G     Catheter in Space: 5  Assessment  Sensory level:  No complications

## 2021-06-17 NOTE — ANESTHESIA PREPROCEDURE EVALUATION
Anesthesia Evaluation      Patient summary reviewed     Airway    Pulmonary - normal exam    breath sounds clear to auscultation  (+) asthma    (-) not a smoker                         Cardiovascular - normal exam  (+) hypertension, ,     Rhythm: regular  Rate: normal,         Neuro/Psych    (+) anxiety/panic attacks,     Endo/Other    (+) obesity, pregnant     GI/Hepatic/Renal    (+) GERD,       Comments: IBS with diarrhea          Dental                         Anesthesia Plan  Planned anesthetic: epidural    ASA 3   Induction: intravenous   Anesthetic plan and risks discussed with: patient and spouse  Anesthesia plan special considerations: rapid sequence induction, increased risk of difficult airway, antiemetics,   Post-op plan: routine recovery

## 2021-06-17 NOTE — ANESTHESIA POSTPROCEDURE EVALUATION
"Patient: Kizzy Chan  * No procedures listed *  Anesthesia type: epidural    /82   Pulse 94   Temp 36.7  C (98  F) (Oral)   Resp 18   Ht 5' 9\" (1.753 m)   Wt (!) 242 lb (109.8 kg)   SpO2 100%   Breastfeeding Unknown   BMI 35.74 kg/m    CNS normal. No post dural puncture headache. No noted or reported complications of labor epidural. Block was one sided, but patient denied s/e of the epidural placement.  "

## 2021-06-17 NOTE — PROGRESS NOTES
ASSESSMENT & PLAN:  Kizzy was seen today for medication management.    Diagnoses and all orders for this visit:    Establishing care with new doctor, encounter for    Generalized anxiety disorder    Irritable bowel syndrome with diarrhea    Other orders  -     norethindrone-ethinyl estradiol-iron (MICROGESTIN FE 1.5/30) 1.5 mg-30 mcg (21)/75 mg (7) per tablet; Take 1 tablet by mouth daily.  -     FLUoxetine (PROZAC) 20 MG capsule; Take 1 capsule (20 mg total) by mouth daily.      Patient presents today to establish care.  No acute concerns.  Does have some degree of whitecoat hypertension as initial blood pressure was elevated but normal on repeat evaluation.  Has generalized anxiety for which fluoxetine has been working well.  Recently went up to 20 mg daily.  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  I suggested that if she is feeling this way she should reach out to me and I would have her go up to 40 mg daily and reevaluate.  I did give her and at that she can do on her phone to help with calming called Marielle since she likely and residency will not have time to attend regular psychotherapy appointments.  -Also needed a refill on her birth control which we had done.  No complications with that.  Reviewed all health history.  IBS has been stable but seems to be more related to her anxiety and typically Imodium helps    Patient Instructions   [facility] [employer] [phone] - transfer to  Ask cait            No orders of the defined types were placed in this encounter.    Medications Discontinued During This Encounter   Medication Reason     norethindrone-ethinyl estradiol-iron (MICROGESTIN FE 1.5/30) 1.5 mg-30 mcg (21)/75 mg (7) per tablet Reorder     FLUoxetine (PROZAC) 10 MG capsule Reorder       No Follow-up on file.     CHIEF COMPLAINT:  Chief Complaint   Patient presents with     Medication Management        HISTORY OF PRESENT  ILLNESS:  Kizzy is a 27 y.o. female presenting to the clinic today for an anxiety medication check. She has been dosing Fluoxetine since January 2017. She increased her fluoxetine dose to 20 mg about 2 months ago. 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 She exercises to de-stress. She denies panic attacks or weight changes. Her mom has a history of post partum depression and brother has a history of anxiety.     REVIEW OF SYSTEMS:   Resp: No shortness of breath.   Neuro: No headaches. Occasional nausea caused by anxiety.   GI: Diarrhea with gluten or dairy consumption.   Skin: No rashes.   All other systems are negative.    Little interest or pleasure in doing things: Not at all  Feeling down, depressed, or hopeless: Not at all  Trouble falling or staying asleep, or sleeping too much: Several days  Feeling tired or having little energy: Not at all  Poor appetite or overeating: More than half the days  Feeling bad about yourself - or that you are a failure or have let yourself or your family down: Not at all  Trouble concentrating on things, such as reading the newspaper or watching television: Several days  Moving or speaking so slowly that other people could have noticed. Or the opposite - being so fidgety or restless that you have been moving around a lot more than usual: Not at all  Thoughts that you would be better off dead, or of hurting yourself in some way: Not at all  PHQ-9 Total Score: 4  If you checked off any problems, how difficult have these problems made it for you to do your work, take care of things at home, or get along with other people?: Not difficult at all  No Data Recorded     PFSH:  Social: She is getting  at the end of May.   TOBACCO USE:  History   Smoking Status     Never Smoker   Smokeless Tobacco     Never Used        VITALS:  Vitals:    05/03/18 1345 05/03/18 1404   BP: 132/90 128/84   Pulse: 76    Resp: 16    Temp: 99.5  F (37.5  C)   "  TempSrc: Oral    Weight: 166 lb (75.3 kg)    Height: 5' 8.75\" (1.746 m)      Wt Readings from Last 3 Encounters:   05/03/18 166 lb (75.3 kg)     Body mass index is 24.69 kg/(m^2).  Patient's last menstrual period was 04/08/2018 (exact date).      PHYSICAL EXAM:  GENERAL:  Reveals an alert 27 y.o. female in NAD.  Vitals:  Per nursing notes.  EYES: PERRLA. Extraocular movements intact. Normal conjunctiva and lids.   ENT:  Hearing grossly normal.  Normal appearance to ears and nose.  Bilateral TM s, external canals, oropharynx normal. Normal lips, gums and teeth.  Normal nasal mucosa, septum and turbinates.  NECK:  Supple, thyroid not palpable.  CARDIAC:  Regular rate and rhythm without murmurs, rubs, or gallops. Legs without edema. Carotids without bruits.   LUNGS: Clear.  Respiratory effort normal.  ABDOMEN:  Soft, non-tender, without hepatosplenomegaly, masses, or hernias.   SKIN:   Without rash, bruise, or palpable lesions.  NEURO:  Cranial nerves II-XII intact.     PSYCH:  Mood appropriate, memory intact.  Mildly anxious      DATA REVIEWED:  ADDITIONAL HISTORY SUMMARIZED (2): Reviewed 11/3/17 note regarding normal physical exam.   DECISION TO OBTAIN EXTRA INFORMATION (1): None.   RADIOLOGY TESTS (1): None.  LABS (1): Reviewed pap from 11/3/17, which was normal.   MEDICINE TESTS (1): Administered PHQ-9 today.   INDEPENDENT REVIEW (2 each): None.     The visit lasted a total of 18 minutes face to face with the patient. Over 50% of the time was spent counseling and educating the patient about her anxiety medication.     IJodie, am scribing for and in the presence of Dr. Holcomb.  I, Dr. Donna Holcomb DO , personally performed the services described in this documentation, as scribed by Jodie Cheng in my presence, and it is both accurate and complete.     MEDICATIONS:  Current Outpatient Prescriptions   Medication Sig Dispense Refill     FLUoxetine (PROZAC) 20 MG capsule Take 1 capsule (20 mg total) by " mouth daily. 90 capsule 3     multivitamin with minerals (THERA-M) 9 mg iron-400 mcg Tab tablet Take 1 tablet by mouth daily.       norethindrone-ethinyl estradiol-iron (MICROGESTIN FE 1.5/30) 1.5 mg-30 mcg (21)/75 mg (7) per tablet Take 1 tablet by mouth daily. 84 tablet 4     No current facility-administered medications for this visit.         Total data points: 4.

## 2021-06-17 NOTE — PROGRESS NOTES
[place] [facility] [place] for [place] on 5/17/2.  COVID swab collected and sent.

## 2021-06-20 ENCOUNTER — HEALTH MAINTENANCE LETTER (OUTPATIENT)
Age: 31
End: 2021-06-20

## 2021-06-20 NOTE — PROGRESS NOTES
ASSESSMENT and PLAN:  Kizzy was seen today for medication management.    Diagnoses and all orders for this visit:    Weight gain    Generalized anxiety disorder    Fear of flying    Other orders  -     Tdap vaccine,  6yo or older,  IM  -     LORazepam (ATIVAN) 0.5 MG tablet; Take 1 tablet by mouth as needed for panic attack or travel      Patient was seen today to discuss weight gain and we reviewed that there is typically not a lot of weight gain on the fluoxetine although it is always possible.  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  We discussed that potentially the weight gain comes from her controlled anxiety when previously she was not able to maintain the calories she was taking in because either had diarrhea or vomiting from IBS.  She sounds like she is eating rather healthy but she is going to try to track it more on a consistent basis of something like my fitness pal to determine if her calorie intake is truly more than she thinks it is.  She will let me know if she wants to consider medication change and we discussed considering citalopram or venlafaxine or even Wellbutrin however Wellbutrin as monotherapy may make her anxiety worse.  She would like to have something for fear of flying and I suggested Lorazepam.  She can trial it at home to see how it makes her feel we discussed if it is not effective she can let me know prior to her travel.  Been on the ocp awhile so likely not contributing to weight gain. She would like to update her Tdap today.  She is aware and might be on leave sometime in January and so we are going to do any medication changes she will try to reach out to me before then.  We discussed safety of the medication she is on in pregnancy.  No family history of thyroid disease no other symptoms of hypothyroidism.  We discussed increased cortisol levels potentially with stress of  residency.  If she decides she wants to do lab testing we can always pend orders that she can have done while she is at the hospital.  There are no Patient Instructions on file for this visit.    Orders Placed This Encounter   Procedures     Tdap vaccine,  8yo or older,  IM     There are no discontinued medications.    No Follow-up on file.   The visit lasted a total of 20 minutes face to face with the patient. Over 50% of the time was spent counseling and educating the patient     CHIEF COMPLAINT:  Chief Complaint   Patient presents with     Medication Management     Would like to duscuss changing fluoxetine.  Feels she is gaining weight with it.  Would also like to discuss something to take for flying.  She is going to [employer].        HISTORY OF PRESENT ILLNESS:  Kizzy Chan is a 27 y.o. female presents today for evaluation of weight gain and wondering if is tied into her SSRI.   Fees like been gaining weight since started fluoxetine which was before residency.   Doesn't think eating poorly. 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 I saw her in May and has had 15lb weight gain.  No time to work out. 2 eggs for breakfast and coffee, salad bar for lunch with vinegar - teaspoon seeds. Drink tea or water. Dinner if at hospital salad bar again or chicken breast. Grills a lot at home (sauces?) Birth control hasnt changed.  in may which was the heaviest had been. Out Friday night or Saturday eats out once or twice, not a lot of alcohol. Was doing beach body on demand. Has sol which goes by portion sizes.  takes stairs. - on microgestin since 14.dose increased in med school -because of heavy period-no weight gain then.  Goal weight 160 -vomiting/ diarrhea when she weighed that weight before residency because anxiety so high.  Going to [place]- cocktails before fly sometimes because of anxiety, feels heart pounding- has 2 connections . Sister studying abroad.  Leaving for  [facility] on 17th x 1 week .     REVIEW OF SYSTEMS:    Comprehensive review of systems is negative except as noted in the HPI.     PFSH:  Tobacco use and medications reviewed below.     TOBACCO USE:  History   Smoking Status     Never Smoker   Smokeless Tobacco     Never Used       VITALS:  Vitals:    10/08/18 1400   BP: 134/82   Pulse: 71   Resp: 16   Temp: 99  F (37.2  C)   TempSrc: Oral   SpO2: 98%   Weight: 185 lb 6.4 oz (84.1 kg)     Wt Readings from Last 3 Encounters:   10/08/18 185 lb 6.4 oz (84.1 kg)   05/11/18 169 lb 11.2 oz (77 kg)   05/03/18 166 lb (75.3 kg)       PHYSICAL EXAM:  Constitutional:  Reveals a 27 y.o. female in NAD.  Vitals:  Per nursing notes.  Neck:  Supple, thyroid not palpable.  Cardiac:  Regular rate and rhythm without murmurs, rubs, or gallops. Carotids without bruits. Legs without edema. Palpation of the radial pulse regular.  Lungs: Clear.  Respiratory effort normal.  Skin:   Without rash, bruise, or palpable lesions.  Rheumatologic: Normal joints and nails of the hands.  Neurologic:  Cranial nerves II-XII intact.     Psychiatric:  Mood appropriate, memory intact.          MEDICATIONS:  Current Outpatient Prescriptions   Medication Sig Dispense Refill     FLUoxetine (PROZAC) 20 MG capsule Take 1 capsule (20 mg total) by mouth daily. 90 capsule 3     multivitamin with minerals (THERA-M) 9 mg iron-400 mcg Tab tablet Take 1 tablet by mouth daily.       norethindrone-ethinyl estradiol-iron (MICROGESTIN FE 1.5/30) 1.5 mg-30 mcg (21)/75 mg (7) per tablet Take 1 tablet by mouth daily. 84 tablet 4     LORazepam (ATIVAN) 0.5 MG tablet Take 1 tablet by mouth as needed for panic attack or travel 10 tablet 0     No current facility-administered medications for this visit.

## 2021-06-24 ENCOUNTER — RECORDS - HEALTHEAST (OUTPATIENT)
Dept: FAMILY MEDICINE | Facility: CLINIC | Age: 31
End: 2021-06-24

## 2021-06-24 DIAGNOSIS — Z02.1 ENCOUNTER FOR PRE-EMPLOYMENT EXAMINATION: ICD-10-CM

## 2021-06-24 NOTE — PROGRESS NOTES
ASSESSMENT/ PLAN    1. Spontaneous miscarriage  Patient had a lot of questions with regards to her miscarriage, further lab work, chances for future miscarriages, whether it's safe to continue current medications. I reviewed her ED visit, serial beta hcg which is now normal, discussed her medications with her. Reassured patient. 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 She plans to try to get pregnant again at some point. Her family has been supportive.   - Thyroid Grady      Greater than 25 minutes was spent today with patient ,more than 50% of time was counseling and coordination of care on the above issues    This note was created using Dragon dictation software, spelling errors may occur.     Sabina Amado MD        SUBJECTIVE   Kizzy Marcos Dustin is a 28 y.o. old female with a past medical history of CASEY who presented to clinic today for further evaluation of follow up of a recent miscarriage. She has a lot of questions about miscarriage and medications she's taking currently.     Review of Systems:  Negative except as noted in HPI    The following portions of the patient's history were reviewed and updated as appropriate: past medical history, past surgical history, family history, allergies, current medications and problem list.    Medical History  Active Ambulatory (Non-Hospital) Problems    Diagnosis     Surveillance for birth control, oral contraceptives     Fear of flying     White coat syndrome without hypertension     Generalized anxiety disorder     Failed moderate sedation during procedure     Irritable bowel syndrome with diarrhea     Disorder of refraction and accommodation     Other acne     Myopia     History reviewed. No pertinent past medical history.    Surgical History  She  has a past surgical history that includes Colonoscopy (01/09/2017) and Closed reduction distal radius fracture (03/26/2005).    Social History  Reviewed, and she  reports that  " has never smoked. she has never used smokeless tobacco.    Family History  Reviewed, and family history includes Anxiety disorder in her brother; Breast cancer in her maternal grandmother; Cancer in her paternal grandfather; Celiac disease in her cousin and another family member; GI problems in her maternal grandfather; Heart disease in her maternal grandfather; Hyperlipidemia in her maternal grandfather; Hypertension in her maternal grandfather; Other in her maternal grandfather, maternal grandmother, and mother; Parkinsonism in her paternal grandmother; Stroke in her paternal grandfather.    Medications  Reviewed and reconciled    Allergies  Allergies   Allergen Reactions     Ceftriaxone Rash     Had a rash possibly from this injection 9-09     Nickel Rash     Penicillins Rash         OBJECTIVE  Physical Exam:  Vital signs: /89 (Patient Site: Left Arm, Patient Position: Sitting, Cuff Size: Adult Regular)   Pulse 77   Resp 18   Ht 5' 9\" (1.753 m)   Wt 189 lb (85.7 kg)   SpO2 97%   BMI 27.91 kg/m    Weight: 189 lb (85.7 kg)    General appearance: pleasant, appears stated age, cooperative and in no distress  Eyes: EOMs intact, PERRL, conjunctivae normal.   ENT: Thyroid normal to palpation, no lump or mass or tenderness  Lymph: no cervical/supraclavicular adenopathy  Respiratory: clear to auscultation bilaterally, good air movement throughout, no wheezing or crackles, speaking full sentences without difficulty  Cardiovascular: regular rate and rhythm, no murmur appreciated, no leg edema  Abdomen: active bowel sounds, soft, non-tender, non-distended  Skin: no rashes  Neuro: alert oriented x 3, grossly normal otherwise  Psych: normal affect, appropriate conversation     "

## 2021-06-24 NOTE — TELEPHONE ENCOUNTER
Dr Estrada would like a phone call to be made checking on patient Tuesday morning to see how the walk in clinic visit went and if she needs anything further.

## 2021-06-24 NOTE — TELEPHONE ENCOUNTER
Orders being requested: Lab work for serial labs  Reason service is needed/diagnosis: Patient was seen at [facility] ED 2/25/19 for threatened miscarriage.  Patient states she was told to get lab work done within 48 hours by ED.    Patient was looking to get just the lab work done at [facility]'s outpatient lab this week, but no orders were placed by ED. Patient was wondering if Dr. Amado would be willing to order those orders in the system.    Patient is scheduled to see Dr. Amado 3/11 for OFV of positive pregnancy test.    When are orders needed by: asap  Where to send Orders: within system.   Okay to leave detailed message?  Yes

## 2021-06-24 NOTE — PROGRESS NOTES
ASSESSMENT & PLAN:  1. Miscarriage  Repeat quant hCG today and weekly until undetectable.  - Beta-hCG, Quantitative  - Beta-hCG, Quantitative; Standing    2. White coat HTN  Advised patient get a BP cuff to have at home and periodically monitor.       There are no Patient Instructions on file for this visit.    Orders Placed This Encounter   Procedures     Beta-hCG, Quantitative     There are no discontinued medications.    No Follow-up on file.    CHIEF COMPLAINT:  Chief Complaint   Patient presents with     Follow-up     ED visit possible threatened miscarriage        HISTORY OF PRESENT ILLNESS:  Kizzy is a 28 y.o. female presenting to the clinic today for follow up on miscarriage.  LMP 19. STarting having light spotting 19, progressed to heavier bleeding with clots and cramping on . Was seen in ER . Pelvic u/s showed no IUP, no adnexal masses. Quant hCG was 40. Hgb 14. Blood type O pos. Wet prep pos for clue cells and given rx for topical metronidazole.     REVIEW OF SYSTEMS:   Reports long h/o white coat HTN. All other systems are negative.    PFSH:  Patient Active Problem List   Diagnosis     Generalized anxiety disorder     Irritable bowel syndrome with diarrhea     Fear of flying     Disorder of refraction and accommodation     Failed moderate sedation during procedure     Myopia     Other acne     Surveillance for birth control, oral contraceptives     White coat syndrome without hypertension        TOBACCO USE:  Social History     Tobacco Use   Smoking Status Never Smoker   Smokeless Tobacco Never Used       VITALS:  Vitals:    19 1407 19 1442   BP: (!) 143/91 133/86   Patient Site: Left Arm    Patient Position: Sitting    Cuff Size: Adult Large    Pulse: 77    Resp: 16    Weight: 189 lb (85.7 kg)      Wt Readings from Last 3 Encounters:   19 189 lb (85.7 kg)   19 185 lb (83.9 kg)   10/08/18 185 lb 6.4 oz (84.1 kg)     Body mass index is 27.91  kg/m .    PHYSICAL EXAM:  GENERAL: Pleasant, well-appearing patient in no acute distress.   HEENT: Pupils equal round. Sclerae and conjunctivae clear. Oropharynx with moist mucous membranes.   NECK: Supple without lymphadenopathy   CARDIOVASCULAR: Heart regular rate and rhythm without murmur normal S1-S2   EXTREMITIES Warm and well-perfused without edema.  NEURO: Alert and oriented. Grossly nonfocal.   PSYCHIATRIC: Presents on time and well groomed. Normal speech and thought content. Full affect. No abnormal movements or behaviors noted.      MEDICATIONS:  Current Outpatient Medications   Medication Sig Dispense Refill     FLUoxetine (PROZAC) 20 MG capsule Take 1 capsule (20 mg total) by mouth daily. 90 capsule 3     LORazepam (ATIVAN) 0.5 MG tablet Take 1 tablet by mouth as needed for panic attack or travel 10 tablet 0     metroNIDAZOLE (METROGEL VAGINAL) 0.75 % vaginal gel Apply one applicatorful intravaginally once daily for 5 days. 70 g 0     multivitamin with minerals (THERA-M) 9 mg iron-400 mcg Tab tablet Take 1 tablet by mouth daily.       norethindrone-ethinyl estradiol-iron (MICROGESTIN FE 1.5/30) 1.5 mg-30 mcg (21)/75 mg (7) per tablet Take 1 tablet by mouth daily. 84 tablet 4     No current facility-administered medications for this visit.

## 2021-06-24 NOTE — TELEPHONE ENCOUNTER
Who is calling:  Patient via [employer]  Reason for Call:  Patient scheduled her own appointment in [employer] as an office visit for what appears to be a pregnancy confirmation.    Clinic please contact patient if reschedule is needed .  Date of last appointment with primary care: 10.08.2018  Has the patient been recently seen:  No  Okay to leave a detailed message: Not able to ask, scheduled via [employer]

## 2021-07-14 ENCOUNTER — LAB (OUTPATIENT)
Dept: LAB | Facility: HOSPITAL | Age: 31
End: 2021-07-14
Payer: COMMERCIAL

## 2021-07-14 DIAGNOSIS — Z02.1 ENCOUNTER FOR PRE-EMPLOYMENT EXAMINATION: ICD-10-CM

## 2021-07-14 DIAGNOSIS — Z11.1 SCREENING EXAMINATION FOR PULMONARY TUBERCULOSIS: ICD-10-CM

## 2021-07-14 PROBLEM — Z30.41 SURVEILLANCE FOR BIRTH CONTROL, ORAL CONTRACEPTIVES: Status: RESOLVED | Noted: 2019-02-25 | Resolved: 2019-05-28

## 2021-07-14 PROCEDURE — 36415 COLL VENOUS BLD VENIPUNCTURE: CPT

## 2021-07-14 PROCEDURE — 86787 VARICELLA-ZOSTER ANTIBODY: CPT

## 2021-07-14 PROCEDURE — 86481 TB AG RESPONSE T-CELL SUSP: CPT | Performed by: FAMILY MEDICINE

## 2021-07-15 LAB — VZV IGG SER QL IA: NORMAL

## 2021-07-16 LAB
QUANTIFERON MITOGEN: 10 IU/ML
QUANTIFERON NIL TUBE: 0.08 IU/ML
QUANTIFERON TB1 TUBE: 0.08 IU/ML
QUANTIFERON TB2 TUBE: 0.09

## 2021-07-19 ENCOUNTER — MEDICAL CORRESPONDENCE (OUTPATIENT)
Dept: HEALTH INFORMATION MANAGEMENT | Facility: CLINIC | Age: 31
End: 2021-07-19

## 2021-07-19 DIAGNOSIS — Z78.9 VARICELLA VACCINATION STATUS UNKNOWN: Primary | ICD-10-CM

## 2021-07-19 LAB
GAMMA INTERFERON BACKGROUND BLD IA-ACNC: 0.08 IU/ML
M TB IFN-G BLD-IMP: NEGATIVE
M TB IFN-G CD4+ BCKGRND COR BLD-ACNC: 9.92 IU/ML
MITOGEN IGNF BCKGRD COR BLD-ACNC: 0 IU/ML
MITOGEN IGNF BCKGRD COR BLD-ACNC: 0.01 IU/ML

## 2021-07-23 ENCOUNTER — LAB (OUTPATIENT)
Dept: LAB | Facility: HOSPITAL | Age: 31
End: 2021-07-23
Payer: COMMERCIAL

## 2021-07-23 DIAGNOSIS — Z78.9 VARICELLA VACCINATION STATUS UNKNOWN: ICD-10-CM

## 2021-07-23 PROCEDURE — 36415 COLL VENOUS BLD VENIPUNCTURE: CPT

## 2021-07-23 PROCEDURE — 86787 VARICELLA-ZOSTER ANTIBODY: CPT

## 2021-07-26 LAB — VZV IGG SER QL IA: NEGATIVE

## 2021-09-21 ENCOUNTER — MEDICAL CORRESPONDENCE (OUTPATIENT)
Dept: HEALTH INFORMATION MANAGEMENT | Facility: OTHER | Age: 31
End: 2021-09-21

## 2021-09-26 ENCOUNTER — ALLIED HEALTH/NURSE VISIT (OUTPATIENT)
Dept: FAMILY MEDICINE | Facility: OTHER | Age: 31
End: 2021-09-26
Attending: FAMILY MEDICINE
Payer: COMMERCIAL

## 2021-09-26 DIAGNOSIS — R05.9 COUGH: Primary | ICD-10-CM

## 2021-09-26 PROCEDURE — C9803 HOPD COVID-19 SPEC COLLECT: HCPCS

## 2021-09-26 PROCEDURE — U0003 INFECTIOUS AGENT DETECTION BY NUCLEIC ACID (DNA OR RNA); SEVERE ACUTE RESPIRATORY SYNDROME CORONAVIRUS 2 (SARS-COV-2) (CORONAVIRUS DISEASE [COVID-19]), AMPLIFIED PROBE TECHNIQUE, MAKING USE OF HIGH THROUGHPUT TECHNOLOGIES AS DESCRIBED BY CMS-2020-01-R: HCPCS | Mod: ZL

## 2021-09-27 LAB — SARS-COV-2 RNA RESP QL NAA+PROBE: NEGATIVE

## 2021-10-11 ENCOUNTER — HEALTH MAINTENANCE LETTER (OUTPATIENT)
Age: 31
End: 2021-10-11

## 2021-10-29 ENCOUNTER — ALLIED HEALTH/NURSE VISIT (OUTPATIENT)
Dept: FAMILY MEDICINE | Facility: OTHER | Age: 31
End: 2021-10-29
Attending: FAMILY MEDICINE

## 2021-10-29 DIAGNOSIS — Z91.199 FAILURE TO ATTEND APPOINTMENT: Primary | ICD-10-CM

## 2021-10-29 PROCEDURE — 91300 PR COVID VAC PFIZER DIL RECON 30 MCG/0.3 ML IM: CPT

## 2021-10-29 PROCEDURE — 0004A PR COVID VAC PFIZER DIL RECON 30 MCG/0.3 ML IM: CPT

## 2021-11-23 ENCOUNTER — MEDICAL CORRESPONDENCE (OUTPATIENT)
Dept: HEALTH INFORMATION MANAGEMENT | Facility: OTHER | Age: 31
End: 2021-11-23
Payer: COMMERCIAL

## 2021-12-23 ENCOUNTER — OFFICE VISIT (OUTPATIENT)
Dept: FAMILY MEDICINE | Facility: OTHER | Age: 31
End: 2021-12-23
Attending: FAMILY MEDICINE
Payer: COMMERCIAL

## 2021-12-23 VITALS
HEART RATE: 94 BPM | DIASTOLIC BLOOD PRESSURE: 80 MMHG | WEIGHT: 215.2 LBS | RESPIRATION RATE: 16 BRPM | TEMPERATURE: 98.1 F | SYSTOLIC BLOOD PRESSURE: 134 MMHG | OXYGEN SATURATION: 96 % | BODY MASS INDEX: 31.78 KG/M2

## 2021-12-23 DIAGNOSIS — B96.89 BV (BACTERIAL VAGINOSIS): Primary | ICD-10-CM

## 2021-12-23 DIAGNOSIS — N76.0 BV (BACTERIAL VAGINOSIS): Primary | ICD-10-CM

## 2021-12-23 DIAGNOSIS — N89.8 VAGINAL IRRITATION: ICD-10-CM

## 2021-12-23 LAB
ALBUMIN UR-MCNC: NEGATIVE MG/DL
APPEARANCE UR: CLEAR
BACTERIA #/AREA URNS HPF: ABNORMAL /HPF
BILIRUB UR QL STRIP: NEGATIVE
CLUE CELLS: PRESENT
COLOR UR AUTO: ABNORMAL
GLUCOSE UR STRIP-MCNC: NEGATIVE MG/DL
HGB UR QL STRIP: NEGATIVE
KETONES UR STRIP-MCNC: NEGATIVE MG/DL
LEUKOCYTE ESTERASE UR QL STRIP: ABNORMAL
NITRATE UR QL: NEGATIVE
PH UR STRIP: 6.5 [PH] (ref 5–9)
RBC URINE: 2 /HPF
SP GR UR STRIP: 1.01 (ref 1–1.03)
SQUAMOUS EPITHELIAL: 1 /HPF
TRICHOMONAS, WET PREP: ABNORMAL
UROBILINOGEN UR STRIP-MCNC: NORMAL MG/DL
WBC URINE: 1 /HPF
WBC'S/HIGH POWER FIELD, WET PREP: ABNORMAL
YEAST, WET PREP: ABNORMAL

## 2021-12-23 PROCEDURE — 87210 SMEAR WET MOUNT SALINE/INK: CPT | Mod: ZL | Performed by: FAMILY MEDICINE

## 2021-12-23 PROCEDURE — 81001 URINALYSIS AUTO W/SCOPE: CPT | Mod: ZL | Performed by: FAMILY MEDICINE

## 2021-12-23 PROCEDURE — 99213 OFFICE O/P EST LOW 20 MIN: CPT | Performed by: FAMILY MEDICINE

## 2021-12-23 PROCEDURE — 87086 URINE CULTURE/COLONY COUNT: CPT | Mod: ZL | Performed by: FAMILY MEDICINE

## 2021-12-23 RX ORDER — CHOLECALCIFEROL (VITAMIN D3) 50 MCG
TABLET ORAL
COMMUNITY
End: 2022-04-05

## 2021-12-23 RX ORDER — VITAMIN A ACETATE, .BETA.-CAROTENE, ASCORBIC ACID, CHOLECALCIFEROL, .ALPHA.-TOCOPHEROL ACETATE, DL-, THIAMINE MONONITRATE, RIBOFLAVIN, NIACINAMIDE, PYRIDOXINE HYDROCHLORIDE, FOLIC ACID, CYANOCOBALAMIN, CALCIUM CARBONATE, FERROUS FUMARATE, ZINC OXIDE, AND CUPRIC OXIDE 2000; 2000; 120; 400; 22; 1.84; 3; 20; 10; 1; 12; 200; 27; 25; 2 [IU]/1; [IU]/1; MG/1; [IU]/1; MG/1; MG/1; MG/1; MG/1; MG/1; MG/1; UG/1; MG/1; MG/1; MG/1; MG/1
TABLET ORAL
COMMUNITY
End: 2022-09-13

## 2021-12-23 RX ORDER — METRONIDAZOLE 500 MG/1
500 TABLET ORAL 2 TIMES DAILY
Qty: 14 TABLET | Refills: 0 | Status: SHIPPED | OUTPATIENT
Start: 2021-12-23 | End: 2021-12-30

## 2021-12-23 ASSESSMENT — ENCOUNTER SYMPTOMS
CHILLS: 0
NERVOUS/ANXIOUS: 0
FEVER: 0
DYSURIA: 1
FREQUENCY: 0

## 2021-12-23 ASSESSMENT — PAIN SCALES - GENERAL: PAINLEVEL: NO PAIN (0)

## 2021-12-23 NOTE — NURSING NOTE
Chief Complaint   Patient presents with     Vaginal Problem     Here today with concerns of possible yeast infection or BV. Discomfort, burning and itching for about 2 days now.     Medication Reconciliation: complete    Stephania Mcfarlane LPN

## 2021-12-23 NOTE — PROGRESS NOTES
SUBJECTIVE:   Nursing Notes:   Stephania Mcfarlane LPN  12/23/2021 10:32 AM  Sign at exiting of workspace  Chief Complaint   Patient presents with     Vaginal Problem     Here today with concerns of possible yeast infection or BV. Discomfort, burning and itching for about 2 days now.     Medication Reconciliation: complete    Stephania MONTAÑO. JEROME Mcfarlane        Kizzy Chan is a 31 year old female who presents to clinic today for vaginal irritation.  She has had burning and itching for the past 2 days.  2 days ago, started with vulvar itching and burning and some dysuria.  Got better, then worse again last night.  This morning is much worse.  Never has had a urinary tract infection.  Has had Bacterial Vaginosis in the past, never yeast.  Never has had symptoms like this in the past.      HPI    I personally reviewed medications/allergies/history listed below:    Patient Active Problem List    Diagnosis Date Noted     Pregnant 02/06/2020     Priority: Medium     Gestational (pregnancy-induced) hypertension without significant proteinuria, complicating childbirth 02/03/2020     Priority: Medium     History of miscarriage 05/28/2019     Priority: Medium     Fear of flying 10/08/2018     Priority: Medium     White coat syndrome without hypertension 04/12/2017     Priority: Medium     Generalized anxiety disorder 01/12/2017     Priority: Medium     Irritable bowel syndrome with diarrhea 02/18/2016     Priority: Medium     Disorder of refraction and accommodation 12/18/2013     Priority: Medium     Overview:   IMO Update         Other acne 04/12/2007     Priority: Medium     Myopia 02/28/2007     Priority: Medium     Overview:   IMO Update         Past Medical History:   Diagnosis Date     Asthma     exercise induced as child     Hypertension     Gestational HTN     Mental disorder     Anxiety on meds      Past Surgical History:   Procedure Laterality Date     CLOSED REDUCTION DISTAL RADIUS FRACTURE  03/26/2005      COLONOSCOPY  01/09/2017     Family History   Problem Relation Age of Onset     Other - See Comments Mother         Ophthalmic Disease     Other - See Comments Maternal Grandmother         Ophthalmic Disease     Breast Cancer Maternal Grandmother         77     Other - See Comments Maternal Grandfather         Ophthalmic Disease     Hyperlipidemia Maternal Grandfather      Hypertension Maternal Grandfather      GI problems Maternal Grandfather      Heart Disease Maternal Grandfather      Parkinsonism Paternal Grandmother      Cancer Paternal Grandfather      Cerebrovascular Disease Paternal Grandfather      Celiac Disease Cousin      Celiac Disease Other      Anxiety Disorder Brother      Social History     Tobacco Use     Smoking status: Never Smoker     Smokeless tobacco: Never Used   Substance Use Topics     Alcohol use: Yes     Social History     Social History Narrative    [employer] - 2/2020    [employer]en - 5/2021    Lc -  - works remotely.     Current Outpatient Medications   Medication Sig Dispense Refill     metroNIDAZOLE (FLAGYL) 500 MG tablet Take 1 tablet (500 mg) by mouth 2 times daily for 7 days 14 tablet 0     Prenatal Vit-Fe Fumarate-FA (PNV PRENATAL PLUS MULTIVITAMIN) 27-1 MG TABS per tablet        vitamin D3 (CHOLECALCIFEROL) 50 mcg (2000 units) tablet        Allergies   Allergen Reactions     Ceftriaxone Rash     Had a rash possibly from this injection 9-09     Nickel Rash     Penicillins Rash       Review of Systems   Constitutional: Negative for chills and fever.   Genitourinary: Positive for dysuria. Negative for frequency and urgency.   Psychiatric/Behavioral: The patient is not nervous/anxious.         OBJECTIVE:     /80 (BP Location: Right arm, Patient Position: Sitting, Cuff Size: Adult Regular)   Pulse 94   Temp 98.1  F (36.7  C) (Tympanic)   Resp 16   Wt 97.6 kg (215 lb 3.2 oz)   LMP  (LMP Unknown)   SpO2 96%   Breastfeeding Yes   BMI 31.78 kg/m    Body mass index  is 31.78 kg/m .  Physical Exam  Constitutional:       Appearance: Normal appearance.   HENT:      Head: Normocephalic.   Eyes:      Extraocular Movements: Extraocular movements intact.      Pupils: Pupils are equal, round, and reactive to light.   Cardiovascular:      Rate and Rhythm: Normal rate and regular rhythm.      Pulses: Normal pulses.      Heart sounds: Normal heart sounds. No murmur heard.      Pulmonary:      Effort: Pulmonary effort is normal.      Breath sounds: Normal breath sounds. No wheezing, rhonchi or rales.   Genitourinary:     Comments: Pelvic Exam:  Vulva: No external lesions, normal hair distribution, no adenopathy  Vagina: Moist, pink, no abnormal discharge, well rugated, no lesions  Cervix: wet pre is taken, parous, smooth, pink, no visible lesions.  IUD strings protruding from cervical os.  Neurological:      Mental Status: She is alert.   Psychiatric:         Mood and Affect: Mood normal.         Behavior: Behavior normal.           PHQ-2 Score:     PHQ-2 ( 1999 Pfizer) 12/23/2021   Q1: Little interest or pleasure in doing things 0   Q2: Feeling down, depressed or hopeless 0   PHQ-2 Score 0         I personally reviewed results withpatient as listed below:   Diagnostic Test Results:  Results for orders placed or performed in visit on 12/23/21 (from the past 24 hour(s))   Wet Prep, Genital    Specimen: Vagina; Swab   Result Value Ref Range    Trichomonas Absent Absent    Yeast Absent Absent    Clue Cells Present (A) Absent    WBCs/high power field 1+ (A) None   UA reflex to Microscopic   Result Value Ref Range    Color Urine Light Yellow Colorless, Straw, Light Yellow, Yellow    Appearance Urine Clear Clear    Glucose Urine Negative Negative mg/dL    Bilirubin Urine Negative Negative    Ketones Urine Negative Negative mg/dL    Specific Gravity Urine 1.015 1.000 - 1.030    Blood Urine Negative Negative    pH Urine 6.5 5.0 - 9.0    Protein Albumin Urine Negative Negative mg/dL    Urobilinogen  Urine Normal Normal, 2.0 mg/dL    Nitrite Urine Negative Negative    Leukocyte Esterase Urine Moderate (A) Negative    Bacteria Urine Few (A) None Seen /HPF    RBC Urine 2 <=2 /HPF    WBC Urine 1 <=5 /HPF    Squamous Epithelials Urine 1 <=1 /HPF       ASSESSMENT/PLAN:       ICD-10-CM    1. BV (bacterial vaginosis)  N76.0 metroNIDAZOLE (FLAGYL) 500 MG tablet    B96.89    2. Vaginal irritation  N89.8 Wet Prep, Genital     Urine Culture Aerobic Bacterial     UA reflex to Microscopic       1.  Wet prep showed clue cells.  Urinalysis relatively normal.  Urine culture pending.  Treat with Metronidazole 500 mg twice daily x 7 days.  2.  See above.    Lenora Osborne MD  [facility] AND [facility]

## 2021-12-25 LAB — BACTERIA UR CULT: NORMAL

## 2022-01-04 ENCOUNTER — OFFICE VISIT (OUTPATIENT)
Dept: FAMILY MEDICINE | Facility: OTHER | Age: 32
End: 2022-01-04
Attending: FAMILY MEDICINE
Payer: COMMERCIAL

## 2022-01-04 ENCOUNTER — HOSPITAL ENCOUNTER (OUTPATIENT)
Dept: ULTRASOUND IMAGING | Facility: OTHER | Age: 32
End: 2022-01-04
Attending: FAMILY MEDICINE
Payer: COMMERCIAL

## 2022-01-04 VITALS
RESPIRATION RATE: 16 BRPM | TEMPERATURE: 97.7 F | DIASTOLIC BLOOD PRESSURE: 94 MMHG | OXYGEN SATURATION: 98 % | SYSTOLIC BLOOD PRESSURE: 122 MMHG | HEART RATE: 75 BPM

## 2022-01-04 DIAGNOSIS — N64.3 GALACTORRHEA OF RIGHT BREAST: Primary | ICD-10-CM

## 2022-01-04 DIAGNOSIS — N64.3 GALACTORRHEA OF RIGHT BREAST: ICD-10-CM

## 2022-01-04 DIAGNOSIS — O92.70 UNSPECIFIED DISORDERS OF LACTATION: ICD-10-CM

## 2022-01-04 DIAGNOSIS — N64.52 BLOODY DISCHARGE FROM RIGHT NIPPLE: ICD-10-CM

## 2022-01-04 PROCEDURE — 99213 OFFICE O/P EST LOW 20 MIN: CPT | Performed by: FAMILY MEDICINE

## 2022-01-04 PROCEDURE — 76642 ULTRASOUND BREAST LIMITED: CPT | Mod: RT

## 2022-01-04 ASSESSMENT — ENCOUNTER SYMPTOMS
FEVER: 0
CHILLS: 0

## 2022-01-04 ASSESSMENT — PAIN SCALES - GENERAL: PAINLEVEL: NO PAIN (0)

## 2022-01-04 NOTE — PROGRESS NOTES
SUBJECTIVE:   Kizzy Chan is a 31 year old female who presents to clinic today for the following health issues:    HPI  31-year-old lactating female presents with intermittent bloody discharge from the right nipple over the past few months. Has become more persistent and that is what prompted the visit today.    Stopped nursing in October and is exclusively pumping. Is using larger cups as well as a pad due to large breasts and elongated nipple.    Non-painful bleeding    No palpable masses.  No overlying skin redness.    Mother with breast cancer in her mid 50s.      Patient Active Problem List    Diagnosis Date Noted     Pregnant 02/06/2020     Priority: Medium     Gestational (pregnancy-induced) hypertension without significant proteinuria, complicating childbirth 02/03/2020     Priority: Medium     History of miscarriage 05/28/2019     Priority: Medium     Fear of flying 10/08/2018     Priority: Medium     White coat syndrome without hypertension 04/12/2017     Priority: Medium     Generalized anxiety disorder 01/12/2017     Priority: Medium     Irritable bowel syndrome with diarrhea 02/18/2016     Priority: Medium     Disorder of refraction and accommodation 12/18/2013     Priority: Medium     Overview:   IMO Update         Other acne 04/12/2007     Priority: Medium     Myopia 02/28/2007     Priority: Medium     Overview:   IMO Update         Past Medical History:   Diagnosis Date     Asthma     exercise induced as child     Hypertension     Gestational HTN     Mental disorder     Anxiety on meds        Review of Systems   Constitutional: Negative for chills and fever.        OBJECTIVE:     BP (!) 122/94   Pulse 75   Temp 97.7  F (36.5  C) (Tympanic)   Resp 16   LMP  (LMP Unknown)   SpO2 98%   Breastfeeding Yes   There is no height or weight on file to calculate BMI.  Physical Exam  Chest:      Comments: Very large breast, right nipple irritation at 6 o'clock position pinpoint bleeding. No  palpable masses or tenderness. No LAD    No overlying skin changes.              ASSESSMENT/PLAN:           ICD-10-CM    1. Galactorrhea of right breast  N64.3 US Breast Right Limited 1-3 Quadrants     MA Diagnostic Bilateral w/Cory     CANCELED: MA Diagnostic Digital Right   2. Bloody discharge from right nipple  N64.52 US Breast Right Limited 1-3 Quadrants     MA Diagnostic Bilateral w/Cory     CANCELED: MA Diagnostic Digital Right   3. Unspecified disorders of lactation  O92.70 US Breast Right Limited 1-3 Quadrants     MA Diagnostic Bilateral w/Cory     CANCELED: MA Diagnostic Digital Right       Superficial irritation of right nipple likely source of blood however given painless, will proceed with diagnostic mammogram and ultrasound          Anitra Givens MD  [facility] AND [facility]

## 2022-01-04 NOTE — NURSING NOTE
Patient is here for concerns of bleeding while pumping. states has noticed a couple months ago, but has been having more frequently. Denies pain. Does have family hx of breast cancer.     No LMP recorded (lmp unknown).  Medication Reconciliation: complete    FOOD SECURITY SCREENING QUESTIONS  Hunger Vital Signs:  Within the past 12 months we worried whether our food would run out before we got money to buy more. Never  Within the past 12 months the food we bought just didn't last and we didn't have money to get more. Never  Love Morrissey LPN 1/4/2022 2:11 PM       Advance care directive on file? no  Advance care directive provided to patient? yes       Love Morrissey LPN

## 2022-01-07 ENCOUNTER — OFFICE VISIT (OUTPATIENT)
Dept: SURGERY | Facility: OTHER | Age: 32
End: 2022-01-07
Attending: SURGERY
Payer: COMMERCIAL

## 2022-01-07 VITALS
HEIGHT: 69 IN | TEMPERATURE: 97.7 F | OXYGEN SATURATION: 98 % | DIASTOLIC BLOOD PRESSURE: 86 MMHG | SYSTOLIC BLOOD PRESSURE: 144 MMHG | BODY MASS INDEX: 31.4 KG/M2 | HEART RATE: 70 BPM | RESPIRATION RATE: 12 BRPM | WEIGHT: 212 LBS

## 2022-01-07 DIAGNOSIS — O92.70 LACTATION PROBLEM: ICD-10-CM

## 2022-01-07 DIAGNOSIS — N64.52 BLOODY DISCHARGE FROM RIGHT NIPPLE: Primary | ICD-10-CM

## 2022-01-07 PROCEDURE — 99203 OFFICE O/P NEW LOW 30 MIN: CPT | Performed by: SURGERY

## 2022-01-07 ASSESSMENT — PAIN SCALES - GENERAL: PAINLEVEL: NO PAIN (0)

## 2022-01-07 ASSESSMENT — MIFFLIN-ST. JEOR: SCORE: 1741.01

## 2022-01-07 NOTE — NURSING NOTE
Patient presents to clinic today for concerns about blood in breast milk when she pumps from right side.  LMP - irregular about two weeks ago  Medication reconciliation completed.    ACP on file? No, form previously provided.     FOOD SECURITY SCREENING QUESTIONS    The next two questions are to help us understand your food security.  If you are feeling you need any assistance in this area, we have resources available to support you today.    Hunger Vital Signs:  Within the past 12 months we worried whether our food would run out before we got money to buy more. Never  Within the past 12 months the food we bought just didn't last and we didn't have money to get more. Never    Michelle Larios CMA(AAMA)..................1/7/2022   9:09 AM

## 2022-01-08 ENCOUNTER — LAB REQUISITION (OUTPATIENT)
Dept: LAB | Facility: OTHER | Age: 32
End: 2022-01-08

## 2022-01-08 LAB
FLUAV RNA SPEC QL NAA+PROBE: NEGATIVE
FLUBV RNA RESP QL NAA+PROBE: NEGATIVE
RSV RNA SPEC NAA+PROBE: NEGATIVE
SARS-COV-2 RNA RESP QL NAA+PROBE: NEGATIVE

## 2022-01-08 PROCEDURE — 87637 SARSCOV2&INF A&B&RSV AMP PRB: CPT | Performed by: FAMILY MEDICINE

## 2022-01-13 PROBLEM — O92.70 LACTATION PROBLEM: Status: ACTIVE | Noted: 2022-01-13

## 2022-01-13 NOTE — PROGRESS NOTES
"OFFICE CONSULTATION NOTE  Patient Name: Kizzy Chan  [address]  [place] [address]  Age:31 year old  Sex: female     Primary Care Physician: Donna Holcomb MD    I was requested to see this patient in consultation by Dr. Givens  for evaluation of right breast nipple discharge. A copy of this note will be sent to Dr. Givens.    HPI:   The patient is 31 year old female with nipple discharge on the right side. She has had this since maybe October. She is pumping to breast feed her child. At first it was occasional, then maybe once a week now it is every other day or so. Sometimes it is clotting blood in her milk, other times there is not any blood and other times the milk is just pink . No breast lumps or bumps. No bleeding if she isn't pumping but she does note a dark \"scab\" on her nipple that she can remove after she showers that is like a clot..She has not noted any new skin lesions on the breasts.    Family history of breast cancer mother at 54 and grandmother at 75-mom with negative genetic studies.  US showed no ductal filling defect, distortion or cysts.  The patient hasn't had biopsy performed.       CONSULTATIONASSESSMENT AND PLAN/RECOMMENDATIONS:   I discussed with the patient the pathophysiology of nipple discharge and breast disease. We specifically discussed that most discharge is not related to breast cancer. Discussed that this is likely some granulation tissue or inflammation from pumping. Options would be to excise the area, monitor for improvement when she done pumping. At this time will monitor. Recommend repeat US when done pumping. Recommend high risk screening protocol per NCCN guidelines including alternating breast MRI and mammograms every 6 months due to family history. Wait until 6 months after done with pumping for MRI.  The patient expressed understanding. Will check with the patient in 3 months and get US and have office visit for exam. If she has " worsening symptoms, new lump or new symptoms she will call. She had questions answered.   REVIEW OF SYSTEMS  GENERAL: No fevers or chills.Denies fatigue, recent weight loss.  HEENT: No sinus drainage. No changes with vision or hearing. No difficulty swallowing.   LYMPHATICS:  No swollen nodes in axilla, neck or groin.  CARDIOVASCULAR: Denies chestpain, palpitations and dyspnea on exertion.  PULMONARY: No shortness of breath or cough. No increase in sputum production.  GI: Denies melena, bright red blood in stools. No hematemesis. No constipation or diarrhea.  : No dysuria or hematuria.  SKIN: No recent rashes or ulcers.   HEMATOLOGY:  No history of easy bruising or bleeding.  ENDOCRINE:  No history of diabetes or thyroid problems.  NEUROLOGY:  No history ofseizures or headaches. No motor or sensory changes.  BREAST:  Denies breast pain. As above.  Past Medical History:   Diagnosis Date     Asthma     exercise induced as child     Hypertension     Gestational HTN     Mental disorder     Anxiety on meds     Past Surgical History:   Procedure Laterality Date     CLOSED REDUCTION DISTAL RADIUS FRACTURE  03/26/2005     COLONOSCOPY  01/09/2017     Current Outpatient Medications   Medication     Prenatal Vit-Fe Fumarate-FA (PNV PRENATAL PLUS MULTIVITAMIN) 27-1 MG TABS per tablet     vitamin D3 (CHOLECALCIFEROL) 50 mcg (2000 units) tablet     No current facility-administered medications for this visit.     Allergies   Allergen Reactions     Ceftriaxone Rash     Had a rash possibly from this injection 9-09     Nickel Rash     Penicillins Rash     Family History   Problem Relation Age of Onset     Other - See Comments Mother         Ophthalmic Disease     Other - See Comments Maternal Grandmother         Ophthalmic Disease     Breast Cancer Maternal Grandmother         77     Other - See Comments Maternal Grandfather         Ophthalmic Disease     Hyperlipidemia Maternal Grandfather      Hypertension Maternal Grandfather   "    GI problems Maternal Grandfather      Heart Disease Maternal Grandfather      Parkinsonism Paternal Grandmother      Cancer Paternal Grandfather      Cerebrovascular Disease Paternal Grandfather      Celiac Disease Cousin      Celiac Disease Other      Anxiety Disorder Brother      Social History     Socioeconomic History     Marital status:      Spouse name: None     Number of children: None     Years of education: None     Highest education level: None   Occupational History     None   Tobacco Use     Smoking status: Never Smoker     Smokeless tobacco: Never Used   Vaping Use     Vaping Use: Never used   Substance and Sexual Activity     Alcohol use: Yes     Comment: 2 glass wine a week     Drug use: Never     Sexual activity: Yes     Partners: Male     Birth control/protection: I.U.D.   Other Topics Concern     None   Social History Narrative    Mac - 2/2020    Coreen - 5/2021    Lc -  - works remotely.     Social Determinants of Health     Financial Resource Strain: Not on file   Food Insecurity: Not on file   Transportation Needs: Not on file   Physical Activity: Not on file   Stress: Not on file   Social Connections: Not on file   Intimate Partner Violence: Not on file   Housing Stability: Not on file     The above history was reviewed today, 1/13/2022    PHYSICAL EXAM  Vitals: BP (!) 144/86 (BP Location: Right arm, Patient Position: Sitting, Cuff Size: Adult Large)   Pulse 70   Temp 97.7  F (36.5  C) (Tympanic)   Resp 12   Ht 1.753 m (5' 9\")   Wt 96.2 kg (212 lb)   LMP 12/24/2021   SpO2 98%   Breastfeeding Yes   BMI 31.31 kg/m    GENERAL: Healthy appearing patient in no acute distress. Pleasant and cooperative with exam andinterview.   HEENT: Head-normocephalic. Eyes-no scleral icterus. Nose-no nasal drainage. No lesions. Mouth-oral mucosa pink and moist, no lesions.  NECK: Supple. No thyroid nodules. Trachea midline.  LYMPHATICS:  No cervical, axillary or " supraclavicular adenopathy.  SKIN: Pink, warm and dry. No jaundice. No rash.  NEURO:  Cranial nerves II-XIIgrossly intact. Alert and oriented.  PSYCH: Appropriate mood and affect.  BREAST: Breasts were examined in the seated and supine position. No mass noted bilaterally. No nipple changes bilaterally. NO discharge noted, 7 o'clock. Small dark ductal eschar. No tenderness noted.    IMAGING/LAB  I personally reviewed patient's recent US images and reports.

## 2022-01-13 NOTE — PATIENT INSTRUCTIONS
Let  Me know if you have changes! We will check with you in about 3 months to see how weaning is going and discuss repeat US and high risk surveillance. Please call or stop by with questions!

## 2022-02-14 ENCOUNTER — APPOINTMENT (OUTPATIENT)
Dept: LAB | Facility: OTHER | Age: 32
End: 2022-02-14
Payer: COMMERCIAL

## 2022-02-14 ENCOUNTER — TELEPHONE (OUTPATIENT)
Dept: FAMILY MEDICINE | Facility: OTHER | Age: 32
End: 2022-02-14

## 2022-02-14 DIAGNOSIS — N89.8 VAGINAL DISCHARGE: Primary | ICD-10-CM

## 2022-02-14 LAB
CLUE CELLS: ABNORMAL
TRICHOMONAS, WET PREP: ABNORMAL
WBC'S/HIGH POWER FIELD, WET PREP: ABNORMAL
YEAST, WET PREP: ABNORMAL

## 2022-02-14 PROCEDURE — 87210 SMEAR WET MOUNT SALINE/INK: CPT | Mod: ZL | Performed by: NURSE PRACTITIONER

## 2022-02-14 NOTE — TELEPHONE ENCOUNTER
Patient has vaginal discharge. Hx of recurrent BV. Obtained wet prep independently and sent to lab. NEVIN Gomez CNP on 2/14/2022 at 7:42 AM

## 2022-02-21 ENCOUNTER — OFFICE VISIT (OUTPATIENT)
Dept: FAMILY MEDICINE | Facility: OTHER | Age: 32
End: 2022-02-21
Attending: FAMILY MEDICINE
Payer: COMMERCIAL

## 2022-02-21 VITALS
TEMPERATURE: 97.5 F | DIASTOLIC BLOOD PRESSURE: 84 MMHG | BODY MASS INDEX: 31.58 KG/M2 | WEIGHT: 213.2 LBS | OXYGEN SATURATION: 100 % | SYSTOLIC BLOOD PRESSURE: 124 MMHG | HEIGHT: 69 IN | HEART RATE: 87 BPM

## 2022-02-21 DIAGNOSIS — R05.9 COUGH: Primary | ICD-10-CM

## 2022-02-21 DIAGNOSIS — F33.9 EPISODE OF RECURRENT MAJOR DEPRESSIVE DISORDER, UNSPECIFIED DEPRESSION EPISODE SEVERITY (H): ICD-10-CM

## 2022-02-21 DIAGNOSIS — R03.0 ELEVATED BLOOD PRESSURE READING WITHOUT DIAGNOSIS OF HYPERTENSION: ICD-10-CM

## 2022-02-21 DIAGNOSIS — F41.9 ANXIETY: ICD-10-CM

## 2022-02-21 PROBLEM — F41.1 ANXIETY STATE: Status: ACTIVE | Noted: 2022-02-21

## 2022-02-21 PROBLEM — T88.52XA FAILED MODERATE SEDATION DURING PROCEDURE: Status: ACTIVE | Noted: 2017-01-09

## 2022-02-21 PROBLEM — Z30.430 ENCOUNTER FOR INSERTION OF COPPER INTRAUTERINE CONTRACEPTIVE DEVICE (IUD): Status: ACTIVE | Noted: 2021-06-30

## 2022-02-21 PROBLEM — N96 RECURRENT PREGNANCY LOSS: Status: ACTIVE | Noted: 2022-02-21

## 2022-02-21 PROBLEM — Z87.59 HISTORY OF GESTATIONAL HYPERTENSION: Status: ACTIVE | Noted: 2022-02-21

## 2022-02-21 PROCEDURE — 99214 OFFICE O/P EST MOD 30 MIN: CPT | Performed by: FAMILY MEDICINE

## 2022-02-21 PROCEDURE — 87637 SARSCOV2&INF A&B&RSV AMP PRB: CPT | Performed by: FAMILY MEDICINE

## 2022-02-21 ASSESSMENT — ANXIETY QUESTIONNAIRES
5. BEING SO RESTLESS THAT IT IS HARD TO SIT STILL: NOT AT ALL
7. FEELING AFRAID AS IF SOMETHING AWFUL MIGHT HAPPEN: SEVERAL DAYS
2. NOT BEING ABLE TO STOP OR CONTROL WORRYING: SEVERAL DAYS
6. BECOMING EASILY ANNOYED OR IRRITABLE: NEARLY EVERY DAY
GAD7 TOTAL SCORE: 10
IF YOU CHECKED OFF ANY PROBLEMS ON THIS QUESTIONNAIRE, HOW DIFFICULT HAVE THESE PROBLEMS MADE IT FOR YOU TO DO YOUR WORK, TAKE CARE OF THINGS AT HOME, OR GET ALONG WITH OTHER PEOPLE: SOMEWHAT DIFFICULT
4. TROUBLE RELAXING: SEVERAL DAYS
3. WORRYING TOO MUCH ABOUT DIFFERENT THINGS: SEVERAL DAYS
1. FEELING NERVOUS, ANXIOUS, OR ON EDGE: NEARLY EVERY DAY

## 2022-02-21 ASSESSMENT — PATIENT HEALTH QUESTIONNAIRE - PHQ9: SUM OF ALL RESPONSES TO PHQ QUESTIONS 1-9: 12

## 2022-02-21 ASSESSMENT — PAIN SCALES - GENERAL: PAINLEVEL: NO PAIN (0)

## 2022-02-21 NOTE — PATIENT INSTRUCTIONS
Restart Prozac 20 mg.  Consider counseling - EAP - employee assistance program.    Will call with multiplex results.    Monitor BP - goal <130/80.  Reduce caffeine.  Start exercise program.    Follow up 1-2 months - recheck BP and reassess mood on Prozac.    Nice to meet you!

## 2022-02-21 NOTE — PROGRESS NOTES
"  Assessment & Plan     Cough  Residual mild cough, hoarse voice; multiplex obtained  - Symptomatic; Yes; 2/14/2022 Influenza A/B & SARS-CoV2 (COVID-19) Virus PCR Multiplex Nose; Future  - Symptomatic; Yes; 2/14/2022 Influenza A/B & SARS-CoV2 (COVID-19) Virus PCR Multiplex Nasopharyngeal    Anxiety  Desires to restart Prozac.   Is at the tail end of nursing/pumping.   Encouraged to consider counseling -EAP at work?  - FLUoxetine (PROZAC) 20 MG capsule; Take 1 capsule (20 mg) by mouth daily    Episode of recurrent major depressive disorder, unspecified depression episode severity (H)  As above  - FLUoxetine (PROZAC) 20 MG capsule; Take 1 capsule (20 mg) by mouth daily    Elevated blood pressure reading without diagnosis of hypertension  Lifestyle modifications - exercise routine, coffee reduction, weight management.  Reassess at 2 month for med check/  If >130/80 - start medication likely.          BMI:   Estimated body mass index is 31.48 kg/m  as calculated from the following:    Height as of this encounter: 1.753 m (5' 9\").    Weight as of this encounter: 96.7 kg (213 lb 3.2 oz).   Weight management plan: Discussed healthy diet and exercise guidelines    Patient Instructions   Restart Prozac 20 mg.  Consider counseling - EAP - employee assistance program.    Will call with multiplex results.    Monitor BP - goal <130/80.  Reduce caffeine.  Start exercise program.    Follow up 1-2 months - recheck BP and reassess mood on Prozac.    Nice to meet you!            Return in about 2 months (around 4/21/2022) for depression/anxiety; BP recheck.    Aviva Ozuna MD  [facility] - JANESSA Durand is a 31 year old who presents for the following health issues     HPI     Establish care - TREVOR álvarez.  Patient is physician at [employer].    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  Multiplex negative.  Asthma as a child - outgrown.  No recent antibiotics.  Non-smoker.  Fully vaccinated - flu and covid.    Hypertension Follow-up - gestational only    Do you check your blood pressure regularly outside of the clinic? No     Are you following a low salt diet? No    Are your blood pressures ever more than 140 on the top number (systolic) OR more   than 90 on the bottom number (diastolic), for example 140/90? Yes   Usual 130s-140s SBP in office  Pregnancy - pre-eclampsia with 1st; borderline with 2nd   - 2 miscarriage  Prior Labetolol.  Baby Aspirin.  Kids ages - son 2 daughter 9 months  IUD in place  Fam Hx - negative; maternal grandpa - CAD/heart isues  Lots of caffeine - 30 ounces per day.  No exercise.  Downloaded Noom.    Depression and Anxiety Follow-Up    How are you doing with your depression since your last visit? Worsened     How are you doing with your anxiety since your last visit?  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 No    Have you had a significant life event? OTHER: job change     Do you have any concerns with your use of alcohol or other drugs? 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; off x few months    2 kids    No side effects    Started job 10/2021 - [employer]    Alcohol - social    No active counseling    Weaning down on nursing - pumping - done in next week or 2    Social History     Tobacco Use      "Smoking status: Never Smoker     Smokeless tobacco: Never Used   Vaping Use     Vaping Use: Never used   Substance Use Topics     Alcohol use: Yes     Comment: 2 glass wine a week     Drug use: Never     PHQ 2/21/2022   PHQ-9 Total Score 12   Q9: Thoughts of better off dead/self-harm past 2 weeks Not at all     CASEY-7 SCORE 2/21/2022   Total Score 10     Last PHQ-9 2/21/2022   1.  Little interest or pleasure in doing things 0   2.  Feeling down, depressed, or hopeless 2   3.  Trouble falling or staying asleep, or sleeping too much 2   4.  Feeling tired or having little energy 3   5.  Poor appetite or overeating 3   6.  Feeling bad about yourself 0   7.  Trouble concentrating 2   8.  Moving slowly or restless 0   Q9: Thoughts of better off dead/self-harm past 2 weeks 0   PHQ-9 Total Score 12   Difficulty at work, home, or with people Somewhat difficult     CASEY-7  2/21/2022   1. Feeling nervous, anxious, or on edge 3   2. Not being able to stop or control worrying 1   3. Worrying too much about different things 1   4. Trouble relaxing 1   5. Being so restless that it is hard to sit still 0   6. Becoming easily annoyed or irritable 3   7. Feeling afraid, as if something awful might happen 1   CASEY-7 Total Score 10   If you checked any problems, how difficult have they made it for you to do your work, take care of things at home, or get along with other people? Somewhat difficult       Family history breast cancer -   Mom and maternal grandma - different types.  Negative genetic testing for both.  Breast surgeon advised alternating q6  mammo and MRI starting 3 months after pumping.    Review of Systems   Constitutional, HEENT, cardiovascular, pulmonary, gi and gu systems are negative, except as otherwise noted.      Objective    /84 (BP Location: Right arm, Patient Position: Chair, Cuff Size: Adult Regular)   Pulse 87   Temp 97.5  F (36.4  C) (Tympanic)   Ht 1.753 m (5' 9\")   Wt 96.7 kg (213 lb 3.2 oz)   SpO2 " 100%   BMI 31.48 kg/m    Body mass index is 31.48 kg/m .  Physical Exam   GENERAL: healthy, alert and no distress  GENERAL: raspy voice  EYES: Eyes grossly normal to inspection, PERRL and conjunctivae and sclerae normal  HENT: ear canals and TM's normal, nose and mouth without ulcers or lesions  NECK: no adenopathy, no asymmetry, masses, or scars and thyroid normal to palpation  RESP: lungs clear to auscultation - no rales, rhonchi or wheezes  CV: regular rate and rhythm, normal S1 S2, no S3 or S4, no murmur, click or rub, no peripheral edema and peripheral pulses strong  ABDOMEN: soft, nontender, no hepatosplenomegaly, no masses and bowel sounds normal  MS: no gross musculoskeletal defects noted, no edema  PSYCH: mentation appears normal, affect normal/bright

## 2022-02-22 ASSESSMENT — ANXIETY QUESTIONNAIRES: GAD7 TOTAL SCORE: 10

## 2022-04-05 ENCOUNTER — OFFICE VISIT (OUTPATIENT)
Dept: SURGERY | Facility: OTHER | Age: 32
End: 2022-04-05
Attending: SURGERY
Payer: COMMERCIAL

## 2022-04-05 VITALS
WEIGHT: 214 LBS | OXYGEN SATURATION: 97 % | DIASTOLIC BLOOD PRESSURE: 88 MMHG | HEART RATE: 82 BPM | BODY MASS INDEX: 31.6 KG/M2 | RESPIRATION RATE: 16 BRPM | TEMPERATURE: 98.1 F | SYSTOLIC BLOOD PRESSURE: 138 MMHG

## 2022-04-05 DIAGNOSIS — Z80.3 FAMILY HISTORY OF BREAST CANCER IN MOTHER: Primary | ICD-10-CM

## 2022-04-05 PROBLEM — O92.70 LACTATION PROBLEM: Status: RESOLVED | Noted: 2022-01-13 | Resolved: 2022-04-05

## 2022-04-05 PROBLEM — Z34.90 PREGNANT: Status: RESOLVED | Noted: 2020-02-06 | Resolved: 2022-04-05

## 2022-04-05 PROCEDURE — 99214 OFFICE O/P EST MOD 30 MIN: CPT | Performed by: SURGERY

## 2022-04-05 ASSESSMENT — PAIN SCALES - GENERAL: PAINLEVEL: NO PAIN (0)

## 2022-04-05 NOTE — PROGRESS NOTES
Primary Care Physician: Donna Holcomb MD      HPI:   Patient is here for follow up. The patient is 31 year old female with previous right bloody nipple discharge while she was lactating. She has stopped pumping and the bleeding has stopped. She isn't having breast tenderness or pain. No new lumps or bumps. She stopped pumping the last week of February.    She has a significant family history of breast cancer with her mother and maternal grandmother both having breast cancer.    ASSESSMENT AND PLAN/RECOMMENDATIONS:  Bloody nipple discharge - resolved  Family history of breast cancer  I discussed with the patient that family history can increase her lifetime risk for breast cancer. Her JUAN ANTONIO model estimates her lifetime risk over 30%. We discussed current NCCN guidelines for high risk screening and surveillance in patients with a greater than 20 % lifetime risk of breast cancer. At this time will hold off on genetic referral as her mother was tested and was negative. The patient expressed understanding and denies further questions at this time. Will order screening mammogram for May and follow that with MRI 6 months later, depending on findings. We discussed alterations to the timing and plans if/when she gets pregnant. The patient will call with questions or concerns.           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 nursing     Hypertension     Gestational HTN     Mental disorder     Anxiety on meds      Past Surgical History:   Procedure Laterality Date     CLOSED REDUCTION DISTAL RADIUS FRACTURE  03/26/2005     COLONOSCOPY  01/09/2017     Family History   Problem Relation Age of Onset     Other - See Comments Mother         Ophthalmic Disease     Other - See Comments Maternal Grandmother         Ophthalmic Disease     Breast Cancer Maternal Grandmother          77     Other - See Comments Maternal Grandfather         Ophthalmic Disease     Hyperlipidemia Maternal Grandfather      Hypertension Maternal Grandfather      GI problems Maternal Grandfather      Heart Disease Maternal Grandfather      Parkinsonism Paternal Grandmother      Cancer Paternal Grandfather      Cerebrovascular Disease Paternal Grandfather      Celiac Disease Cousin      Celiac Disease Other      Anxiety Disorder Brother      Social History     Socioeconomic History     Marital status:      Spouse name: None     Number of children: None     Years of education: None     Highest education level: None   Occupational History     None   Tobacco Use     Smoking status: Never Smoker     Smokeless tobacco: Never Used   Vaping Use     Vaping Use: Never used   Substance and Sexual Activity     Alcohol use: Yes     Alcohol/week: 2.0 standard drinks     Types: 2 Glasses of wine per week     Comment: 2 glass wine a week     Drug use: Never     Sexual activity: Yes     Partners: Male     Birth control/protection: I.U.D.   Other Topics Concern     None   Social History Narrative    Mac - 2/2020    Coreen - 5/2021    Lc -  - works remotely.     Social Determinants of Health     Financial Resource Strain: Not on file   Food Insecurity: Not on file   Transportation Needs: Not on file   Physical Activity: Not on file   Stress: Not on file   Social Connections: Not on file   Intimate Partner Violence: Not on file   Housing Stability: Not on file     Current Outpatient Medications   Medication     FLUoxetine (PROZAC) 20 MG capsule     Prenatal Vit-Fe Fumarate-FA (PNV PRENATAL PLUS MULTIVITAMIN) 27-1 MG TABS per tablet     No current facility-administered medications for this visit.     Allergies   Allergen Reactions     Ceftriaxone Rash     Had a rash possibly from this injection 9-09     Nickel Rash     Penicillins Rash     REVIEW OF SYSTEMS  GENERAL: No fevers or chills. Denies  fatigue, recent weight loss.  HEENT: No sinus drainage. No changes with vision or hearing. No difficulty swallowing.   LYMPHATICS:  No swollen nodes in axilla, neck or groin.  CARDIOVASCULAR: Denies chest pain, palpitations and dyspnea on exertion.  PULMONARY: No shortness of breath or cough. No increase in sputum production.  GI: Denies melena, bright red blood in stools. No hematemesis. No constipation or diarrhea.  : No dysuria or hematuria.  SKIN: No recent rashes or ulcers.   HEMATOLOGY:  No history of easy bruising or bleeding.  ENDOCRINE:  No history of diabetes or thyroid problems.  NEUROLOGY:  No history of seizures or headaches. No motor or sensory changes.    PHYSICAL EXAM  Vitals: /88 (BP Location: Right arm, Patient Position: Sitting, Cuff Size: Adult Large)   Pulse 82   Temp 98.1  F (36.7  C) (Tympanic)   Resp 16   Wt 97.1 kg (214 lb)   SpO2 97%   BMI 31.60 kg/m    GENERAL: Healthy appearing patient in no acute distress. Pleasant and cooperative with exam and interview.   HEENT:Head-normocephalic. Eyes-no scleral icterus. Nose-no nasal drainage. No lesions. Mouth-oral mucosa pink and moist, no lesions.  NECK: Supple. No thyroid nodules. Trachea midline.  SKIN: Pink, warm and dry. No jaundice. No rash.  NEURO:  Cranial nerves II-XII grossly intact. Alert and oriented.  PSYCH: Appropriate mood and affect.    IMAGING/LAB  none

## 2022-04-05 NOTE — NURSING NOTE
"Chief Complaint   Patient presents with     RECHECK     f/u breast issues       Initial /88 (BP Location: Right arm, Patient Position: Sitting, Cuff Size: Adult Large)   Pulse 82   Temp 98.1  F (36.7  C) (Tympanic)   Resp 16   Wt 97.1 kg (214 lb)   SpO2 97%   BMI 31.60 kg/m   Estimated body mass index is 31.6 kg/m  as calculated from the following:    Height as of 2/21/22: 1.753 m (5' 9\").    Weight as of this encounter: 97.1 kg (214 lb).  Medication Reconciliation: complete    Tejal Grace LPN    "

## 2022-04-05 NOTE — PATIENT INSTRUCTIONS
You will get a reminder to schedule mammogram in May. 6 months after that will get letter for screening MRI due to lifetime risk of breast cancer more than 20%. Please call if you note any changes or have concerns or questions before that time.

## 2022-04-30 ENCOUNTER — OFFICE VISIT (OUTPATIENT)
Dept: FAMILY MEDICINE | Facility: OTHER | Age: 32
End: 2022-04-30
Attending: PHYSICIAN ASSISTANT
Payer: COMMERCIAL

## 2022-04-30 VITALS
WEIGHT: 210.8 LBS | RESPIRATION RATE: 16 BRPM | TEMPERATURE: 97.8 F | SYSTOLIC BLOOD PRESSURE: 122 MMHG | DIASTOLIC BLOOD PRESSURE: 86 MMHG | OXYGEN SATURATION: 97 % | BODY MASS INDEX: 31.13 KG/M2 | HEART RATE: 84 BPM

## 2022-04-30 DIAGNOSIS — R19.5 LOOSE STOOLS: Primary | ICD-10-CM

## 2022-04-30 LAB
C DIFF TOX B STL QL: NEGATIVE
FLUAV RNA SPEC QL NAA+PROBE: NEGATIVE
FLUBV RNA RESP QL NAA+PROBE: NEGATIVE
RIBOTYPE 027/NAP1/BI: NORMAL
RSV RNA SPEC NAA+PROBE: NEGATIVE
SARS-COV-2 RNA RESP QL NAA+PROBE: NEGATIVE

## 2022-04-30 PROCEDURE — 87506 IADNA-DNA/RNA PROBE TQ 6-11: CPT | Mod: ZL | Performed by: PHYSICIAN ASSISTANT

## 2022-04-30 PROCEDURE — 87449 NOS EACH ORGANISM AG IA: CPT | Mod: ZL | Performed by: PHYSICIAN ASSISTANT

## 2022-04-30 PROCEDURE — 87637 SARSCOV2&INF A&B&RSV AMP PRB: CPT | Mod: ZL | Performed by: PHYSICIAN ASSISTANT

## 2022-04-30 PROCEDURE — 99213 OFFICE O/P EST LOW 20 MIN: CPT | Performed by: PHYSICIAN ASSISTANT

## 2022-04-30 PROCEDURE — C9803 HOPD COVID-19 SPEC COLLECT: HCPCS | Performed by: PHYSICIAN ASSISTANT

## 2022-04-30 PROCEDURE — 87209 SMEAR COMPLEX STAIN: CPT | Mod: ZL | Performed by: PHYSICIAN ASSISTANT

## 2022-04-30 PROCEDURE — 87493 C DIFF AMPLIFIED PROBE: CPT | Mod: ZL | Performed by: PHYSICIAN ASSISTANT

## 2022-04-30 ASSESSMENT — PAIN SCALES - GENERAL: PAINLEVEL: NO PAIN (0)

## 2022-04-30 NOTE — PROGRESS NOTES
ASSESSMENT/PLAN:    I have reviewed the nursing notes.  I have reviewed the findings, diagnosis, plan and need for follow up with the patient.    1. Loose stools  - Symptomatic; Yes; 4/26/2022 Influenza A/B & SARS-CoV2 (COVID-19) Virus PCR Multiplex Nose  - Clostridium difficile Toxin B PCR  - Enteric Bacteria and Virus Panel by [facility] Stool  - Adenovirus antigen, Stool  - Ova and Parasite Exam Routine  - Vitals signs stable. Examination available for review below. Multiplex swab performed due to possible work place exposure. Will also send home stool sample kit for collection - will return as soon as possible. OK to drop off in [facility] between business hours of 10 am-8 pm, otherwise OK to drop off in ER if after hours. Anticipate up to 1-3 day turn around time on labs. Will update on results as available. If COVID positive 5 day quarantine from symptom onset, fever free for 24 hours (without fever reducers - Tylenol/NSAIDs) prior to return to work. Push fluids, continue with symptomatic remedies of care. Follow up with high fevers (103-104 F), abdominal pain, worsening of symptoms/signs of dehydration. Patient is in agreement and understanding of the above treatment plan. All questions and concerns were addressed and answered to patient's satisfaction. AVS reviewed with patient.     Discussed warning signs/symptoms indicative of need to f/u    Follow up if symptoms persist or worsen or concerns    I explained my diagnostic considerations and recommendations to the patient, who voiced understanding and agreement with the treatment plan. All questions were answered. We discussed potential side effects of any prescribed or recommended therapies, as well as expectations for response to treatments.    Kelli Mcgrath PA-C  4/30/2022  3:08 PM    HPI:    Carlitoscorriesusan SABRA Hemant Chan is a 31 year old female  who presents to [facility] today for concerns of URI, x Tuesday (3-4 days). Watering stools over last 2 days. No  blood in stools. No recent antibiotic use.     Symptoms:  No fevers, but does note chills. No fevers  No sore throat/pharyngitis/tonsillitis.   No allergy/URI Symptoms  No Muffled Sounds/Change in Hearing  No Sensation of Fullness in Ear(s)  No Ringing in Ears/Tinnitus  No Balance Changes  No Dizziness  No Congestion (head/nasal/chest)  No Cough/Productive Cough  No Post Nasal Drip  No Headache  No Sinus Pain/Pressure  Yes Myalgias  No Otalgia  Activity Level Changes: Yes: tired  Appetite/Liquid Intake Changes: Yes: decreased solids. Trying to force solids down.   Changes to Bowel Habits: Yes: diarrhea  Changes to Bladder Habits: Yes: decreased due to decreased intake   Additional Symptoms to Report: Yes: vomiting   History of similar symptoms: No  Prior workup: No    Treatments tried: Imodium    Negative home COVID test x 3 days prior.     Site of exposure: work and kids in .   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     Hypertension     Gestational HTN     Mental disorder     Anxiety on meds     Past Surgical History:   Procedure Laterality Date     CLOSED REDUCTION DISTAL RADIUS FRACTURE  03/26/2005     COLONOSCOPY  01/09/2017     Social History     Tobacco Use     Smoking status: Never Smoker     Smokeless tobacco: Never Used   Substance Use Topics     Alcohol use: Yes     Alcohol/week: 2.0 standard drinks     Types: 2 Glasses of wine per week     Comment: 2 glass wine a week     Current Outpatient Medications   Medication Sig Dispense Refill     FLUoxetine (PROZAC) 20 MG capsule Take 1 capsule (20 mg) by mouth daily 90 capsule 1     Prenatal Vit-Fe Fumarate-FA (PNV PRENATAL PLUS MULTIVITAMIN)  27-1 MG TABS per tablet        Allergies   Allergen Reactions     Ceftriaxone Rash     Had a rash possibly from this injection 9-09     Nickel Rash     Penicillins Rash     Past medical history, past surgical history, current medications and allergies reviewed and accurate to the best of my knowledge.      ROS:  Refer to HPI    /86 (BP Location: Right arm, Patient Position: Sitting, Cuff Size: Adult Regular)   Pulse 84   Temp 97.8  F (36.6  C) (Temporal)   Resp 16   Wt 95.6 kg (210 lb 12.8 oz)   LMP  (LMP Unknown)   SpO2 97%   Breastfeeding No   BMI 31.13 kg/m      EXAM:  General Appearance: Well appearing 31 year old female, appropriate appearance for age. No acute distress   Ears: Left TM intact, translucent with bony landmarks appreciated, no erythema, no effusion, no bulging, no purulence.  Right TM intact, translucent with bony landmarks appreciated, no erythema, no effusion, no bulging, no purulence.  Left auditory canal clear.  Right auditory canal clear.  Normal external ears, non tender.  Eyes: conjunctivae normal without erythema or irritation, corneas clear, no drainage or crusting, no eyelid swelling, pupils equal   Oropharynx: moist mucous membranes, posterior pharynx without erythema, tonsils symmetric, no erythema, no exudates or petechiae, no post nasal drip seen, no trismus, voice clear.    Sinuses:  No sinus tenderness upon palpation of the frontal or maxillary sinuses  Nose:  Bilateral nares: no erythema, no edema, no drainage or congestion   Neck: supple without adenopathy  Respiratory: normal chest wall and respirations.  Normal effort.  Clear to auscultation bilaterally, no wheezing, crackles or rhonchi.  No increased work of breathing.  No cough appreciated.  Cardiac: RRR with no murmurs  Abdomen: soft, nontender, no rigidity, no rebound tenderness or guarding, normal bowel sounds present  :  No suprapubic tenderness to palpation.  Absent CVA tenderness to palpation.     Dermatological: no rashes noted of exposed skin  Psychological: normal affect, alert, oriented, and pleasant.     Labs:  Multiplex in process  Stool samples - send home    Xray:  None

## 2022-04-30 NOTE — PATIENT INSTRUCTIONS
Will update on stool cultures as available. Anticipate up to 1-3 day turn around times on labs.     Multiplex will take 1-2 hours to return. Will update on results as available. 5 day quarantine from symptom onset if COVID positive (also recommend to be fever free for 24 hours without fever reducers). If RSV, Influenza positive recommend 3-5 day quarantine from symptom onset.

## 2022-04-30 NOTE — NURSING NOTE
"Chief Complaint   Patient presents with     Flu     Flu like symptoms; vomiting, diarrhea       Initial /86 (BP Location: Right arm, Patient Position: Sitting, Cuff Size: Adult Regular)   Pulse 84   Temp 97.8  F (36.6  C) (Temporal)   Resp 16   Wt 95.6 kg (210 lb 12.8 oz)   LMP  (LMP Unknown)   SpO2 97%   Breastfeeding No   BMI 31.13 kg/m   Estimated body mass index is 31.13 kg/m  as calculated from the following:    Height as of 2/21/22: 1.753 m (5' 9\").    Weight as of this encounter: 95.6 kg (210 lb 12.8 oz).     Medication Reconciliation: complete      FOOD SECURITY SCREENING QUESTIONS:    The next two questions are to help us understand your food security.  If you are feeling you need any assistance in this area, we have resources available to support you today.    Hunger Vital Signs:  Within the past 12 months we worried whether our food would run out before we got money to buy more. Never  Within the past 12 months the food we bought just didn't last and we didn't have money to get more. Never      Advance care plan reviewed      Yamilex Jon LPN on 4/30/2022 at 3:06 PM      "

## 2022-05-01 LAB
C COLI+JEJUNI+LARI FUSA STL QL NAA+PROBE: NOT DETECTED
EC STX1 GENE STL QL NAA+PROBE: NOT DETECTED
EC STX2 GENE STL QL NAA+PROBE: NOT DETECTED
NOROV GI+II ORF1-ORF2 JNC STL QL NAA+PR: NOT DETECTED
RVA NSP5 STL QL NAA+PROBE: DETECTED
SALMONELLA SP RPOD STL QL NAA+PROBE: NOT DETECTED
SHIGELLA SP+EIEC IPAH STL QL NAA+PROBE: NOT DETECTED
V CHOL+PARA RFBL+TRKH+TNAA STL QL NAA+PR: NOT DETECTED
Y ENTERO RECN STL QL NAA+PROBE: NOT DETECTED

## 2022-05-02 LAB
HADV AG STL QL IA: NEGATIVE
O+P STL MICRO: NEGATIVE

## 2022-05-10 ENCOUNTER — HOSPITAL ENCOUNTER (OUTPATIENT)
Dept: MAMMOGRAPHY | Facility: OTHER | Age: 32
Discharge: HOME | End: 2022-05-10
Attending: SURGERY | Admitting: SURGERY
Payer: COMMERCIAL

## 2022-05-10 DIAGNOSIS — Z12.31 SCREENING MAMMOGRAM FOR HIGH-RISK PATIENT: ICD-10-CM

## 2022-05-10 PROCEDURE — 77067 SCR MAMMO BI INCL CAD: CPT

## 2022-08-01 ENCOUNTER — HOSPITAL ENCOUNTER (OUTPATIENT)
Dept: GENERAL RADIOLOGY | Facility: OTHER | Age: 32
Discharge: HOME | End: 2022-08-01
Attending: NURSE PRACTITIONER | Admitting: NURSE PRACTITIONER
Payer: COMMERCIAL

## 2022-08-01 ENCOUNTER — TELEPHONE (OUTPATIENT)
Dept: FAMILY MEDICINE | Facility: OTHER | Age: 32
End: 2022-08-01

## 2022-08-01 DIAGNOSIS — M25.571 PAIN IN JOINT, ANKLE AND FOOT, RIGHT: Primary | ICD-10-CM

## 2022-08-01 DIAGNOSIS — M25.571 PAIN IN JOINT, ANKLE AND FOOT, RIGHT: ICD-10-CM

## 2022-08-01 PROCEDURE — 73610 X-RAY EXAM OF ANKLE: CPT | Mod: RT

## 2022-08-01 NOTE — TELEPHONE ENCOUNTER
Patient rolled ankle over weekend. Has been wearing brace. Xray ordered to rule out fracture. NEVIN Gomez CNP on 8/1/2022 at 7:41 AM

## 2022-09-13 ENCOUNTER — OFFICE VISIT (OUTPATIENT)
Dept: FAMILY MEDICINE | Facility: OTHER | Age: 32
End: 2022-09-13
Attending: FAMILY MEDICINE
Payer: COMMERCIAL

## 2022-09-13 VITALS
BODY MASS INDEX: 32.78 KG/M2 | RESPIRATION RATE: 16 BRPM | TEMPERATURE: 98.2 F | HEART RATE: 84 BPM | WEIGHT: 222 LBS | SYSTOLIC BLOOD PRESSURE: 130 MMHG | DIASTOLIC BLOOD PRESSURE: 84 MMHG | OXYGEN SATURATION: 97 %

## 2022-09-13 DIAGNOSIS — Z30.9 ENCOUNTER FOR CONTRACEPTIVE MANAGEMENT, UNSPECIFIED TYPE: Primary | ICD-10-CM

## 2022-09-13 PROCEDURE — 58301 REMOVE INTRAUTERINE DEVICE: CPT | Performed by: FAMILY MEDICINE

## 2022-09-13 RX ORDER — ETONOGESTREL AND ETHINYL ESTRADIOL VAGINAL RING .015; .12 MG/D; MG/D
1 RING VAGINAL
Qty: 3 EACH | Refills: 4 | Status: SHIPPED | OUTPATIENT
Start: 2022-09-13 | End: 2022-11-25

## 2022-09-13 ASSESSMENT — PATIENT HEALTH QUESTIONNAIRE - PHQ9
SUM OF ALL RESPONSES TO PHQ QUESTIONS 1-9: 1
SUM OF ALL RESPONSES TO PHQ QUESTIONS 1-9: 1

## 2022-09-13 ASSESSMENT — PAIN SCALES - GENERAL: PAINLEVEL: NO PAIN (0)

## 2022-09-13 NOTE — NURSING NOTE
Chief Complaint   Patient presents with     Contraception     Here today for contraception management. Currently has IUD. Not happy with it. Thinking about NuvaRing.      Medication Reconciliation: complete    Stephania Mcfarlane LPN

## 2022-09-13 NOTE — PROGRESS NOTES
Nursing Notes:   Stephania Mcfarlane LPN  9/13/2022  8:47 AM  Sign at exiting of workspace  Chief Complaint   Patient presents with     Contraception     Here today for contraception management. Currently has IUD. Not happy with it. Thinking about NuvaRing.      Medication Reconciliation: complete    Stephania Mcfarlane LPN      Palak Durand is a 31 year old, presenting for the following health issues:  Contraception    Has had a mirena.  Still gets a regular period, but also gets spotting and cramping between.  This was placed 5/2021.  Has spotting every other day.  Her periods have historically very irregular.  Never has had a diagnosis of PCOS.  Has a hard time taking pills every day.  Still not ready to be done with child bearing.      Contraception    History of Present Illness       Reason for visit:  IUD removal, new contraception    She eats 2-3 servings of fruits and vegetables daily.She consumes 0 sweetened beverage(s) daily.She exercises with enough effort to increase her heart rate 9 or less minutes per day.  She exercises with enough effort to increase her heart rate 3 or less days per week. She is missing 2 dose(s) of medications per week.  She is not taking prescribed medications regularly due to remembering to take.    Today's PHQ-9         PHQ-9 Total Score: 1    PHQ-9 Q9 Thoughts of better off dead/self-harm past 2 weeks :   Not at all           Contraceptive Management        Review of Systems   Constitutional, HEENT, cardiovascular, pulmonary, GI, , musculoskeletal, neuro, skin, endocrine and psych systems are negative, except as otherwise noted.      Objective    /84 (BP Location: Right arm, Patient Position: Sitting, Cuff Size: Adult Regular)   Pulse 84   Temp 98.2  F (36.8  C) (Tympanic)   Resp 16   Wt 100.7 kg (222 lb)   LMP 09/01/2022   SpO2 97%   Breastfeeding No   BMI 32.78 kg/m    Body mass index is 32.78 kg/m .  Physical Exam  Constitutional:       Appearance: Normal  appearance.   HENT:      Head: Normocephalic.   Genitourinary:     Comments: Pelvic Exam:  Vulva: No external lesions, normal hair distribution, no adenopathy  Vagina: Moist, pink, no abnormal discharge, well rugated, no lesions  Cervix: Parous, smooth, pink, no visible lesions.  IUD strings noted protruding from cervical os.  Strings were grasped and IUD removed without difficulty.  Neurological:      Mental Status: She is alert.   Psychiatric:         Mood and Affect: Mood normal.         Behavior: Behavior normal.              Assessment & Plan   Kizzy Chan is a 31 year old, presenting for the following health issues:      ICD-10-CM    1. Encounter for contraceptive management, unspecified type  Z30.9 etonogestrel-ethinyl estradiol (NUVARING) 0.12-0.015 MG/24HR vaginal ring     REMOVE INTRAUTERINE DEVICE     1.  Mirena removed today without difficulty.  She would like to try Nuvaring instead.  Prescription provided.  Follow up if any other concerns.      Encouraged weight loss and regular exercise.     No follow-ups on file.    Lenora Osborne MD  [facility] AND [facility]

## 2022-11-25 ENCOUNTER — OFFICE VISIT (OUTPATIENT)
Dept: FAMILY MEDICINE | Facility: OTHER | Age: 32
End: 2022-11-25
Attending: NURSE PRACTITIONER
Payer: COMMERCIAL

## 2022-11-25 VITALS
HEART RATE: 70 BPM | DIASTOLIC BLOOD PRESSURE: 82 MMHG | TEMPERATURE: 97.6 F | OXYGEN SATURATION: 99 % | RESPIRATION RATE: 16 BRPM | WEIGHT: 235.6 LBS | BODY MASS INDEX: 34.79 KG/M2 | SYSTOLIC BLOOD PRESSURE: 132 MMHG

## 2022-11-25 DIAGNOSIS — R30.0 DYSURIA: ICD-10-CM

## 2022-11-25 DIAGNOSIS — N39.9 URINARY PROBLEM IN FEMALE: Primary | ICD-10-CM

## 2022-11-25 LAB
ALBUMIN UR-MCNC: NEGATIVE MG/DL
APPEARANCE UR: CLEAR
BILIRUB UR QL STRIP: NEGATIVE
COLOR UR AUTO: NORMAL
GLUCOSE UR STRIP-MCNC: NEGATIVE MG/DL
HGB UR QL STRIP: NEGATIVE
KETONES UR STRIP-MCNC: NEGATIVE MG/DL
LEUKOCYTE ESTERASE UR QL STRIP: NEGATIVE
NITRATE UR QL: NEGATIVE
PH UR STRIP: 6 [PH] (ref 5–9)
SP GR UR STRIP: 1.02 (ref 1–1.03)
UROBILINOGEN UR STRIP-MCNC: NORMAL MG/DL

## 2022-11-25 PROCEDURE — 99212 OFFICE O/P EST SF 10 MIN: CPT | Performed by: NURSE PRACTITIONER

## 2022-11-25 PROCEDURE — 81003 URINALYSIS AUTO W/O SCOPE: CPT | Mod: ZL | Performed by: NURSE PRACTITIONER

## 2022-11-25 ASSESSMENT — PAIN SCALES - GENERAL: PAINLEVEL: NO PAIN (0)

## 2022-11-25 NOTE — NURSING NOTE
"Chief Complaint   Patient presents with     Urinary Problem       Initial /82   Pulse 70   Temp 97.6  F (36.4  C) (Tympanic)   Resp 16   Wt 106.9 kg (235 lb 9.6 oz)   SpO2 99%   BMI 34.79 kg/m   Estimated body mass index is 34.79 kg/m  as calculated from the following:    Height as of 2/21/22: 1.753 m (5' 9\").    Weight as of this encounter: 106.9 kg (235 lb 9.6 oz).  Medication Reconciliation: complete    FOOD SECURITY SCREENING QUESTIONS  Hunger Vital Signs:  Within the past 12 months we worried whether our food would run out before we got money to buy more. Never  Within the past 12 months the food we bought just didn't last and we didn't have money to get more. Never  Winsome Moon LPN 11/25/2022 2:00 PM        "

## 2022-11-25 NOTE — PROGRESS NOTES
ASSESSMENT/PLAN:    I have reviewed the nursing notes.  I have reviewed the findings, diagnosis, plan and need for follow up with the patient.    1. Urinary problem in female  2. Dysuria  - UA reflex to Microscopic and Culture  Reviewed result with patient which revealed normal urinalysis.  There is no indication for antibiotics at this time. Do not suspect UTI based on this.  Push fluids and follow-up if worsening condition or new symptoms/worsening concerns present in the next several days.  Offered wet prep, does not feel this is indicated today.  She also has upcoming appointment with PCP as well.     Discussed warning signs/symptoms indicative of need to f/u    Follow up if symptoms persist or worsen or concerns    I explained my diagnostic considerations and recommendations to the patient, who voiced understanding and agreement with the treatment plan. All questions were answered. We discussed potential side effects of any prescribed or recommended therapies, as well as expectations for response to treatments.    Stephania Rajan NP  11/25/2022  2:33 PM    HPI:  Kizzy Chan is a 32 year old female who presents to [facility] today for concerns of some new dysuria and urinary frequency.  Denies fevers, nausea vomiting, flank pain.  No vaginal discharge or concerns of vaginal infection or STD. She was recently at a water park and wearing a swimsuit for about 3 days.  No prior history of urinary tract infections.  Would like to rule out urinary tract infection today.  She declines any need for wet prep at this time.    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 nursing     Hypertension     Gestational HTN     Mental disorder     Anxiety on meds     Past Surgical History:   Procedure Laterality Date     CLOSED REDUCTION DISTAL RADIUS FRACTURE   03/26/2005     COLONOSCOPY  01/09/2017     Social History     Tobacco Use     Smoking status: Never     Smokeless tobacco: Never   Substance Use Topics     Alcohol use: Yes     Alcohol/week: 2.0 standard drinks     Types: 2 Glasses of wine per week     Comment: 2 glass wine a week     Current Outpatient Medications   Medication Sig Dispense Refill     FLUoxetine (PROZAC) 20 MG capsule Take 1 capsule (20 mg) by mouth daily 90 capsule 1     etonogestrel-ethinyl estradiol (NUVARING) 0.12-0.015 MG/24HR vaginal ring Place 1 each vaginally every 28 days 3 each 4     Allergies   Allergen Reactions     Ceftriaxone Rash     Had a rash possibly from this injection 9-09     Nickel Rash     Penicillins Rash     Past medical history, past surgical history, current medications and allergies reviewed and accurate to the best of my knowledge.      ROS:  Refer to HPI    /82   Pulse 70   Temp 97.6  F (36.4  C) (Tympanic)   Resp 16   Wt 106.9 kg (235 lb 9.6 oz)   SpO2 99%   BMI 34.79 kg/m      EXAM:  General Appearance: Well appearing 32 year old female, appropriate appearance for age. No acute distress   Respiratory:  No increased work of breathing.  No cough appreciated.  Psychological: normal affect, alert, oriented, and pleasant.     Results for orders placed or performed in visit on 11/25/22   UA reflex to Microscopic and Culture     Status: Normal    Specimen: Urine, Midstream   Result Value Ref Range    Color Urine Light Yellow Colorless, Straw, Light Yellow, Yellow    Appearance Urine Clear Clear    Glucose Urine Negative Negative mg/dL    Bilirubin Urine Negative Negative    Ketones Urine Negative Negative mg/dL    Specific Gravity Urine 1.016 1.000 - 1.030    Blood Urine Negative Negative    pH Urine 6.0 5.0 - 9.0    Protein Albumin Urine Negative Negative mg/dL    Urobilinogen Urine Normal Normal, 2.0 mg/dL    Nitrite Urine Negative Negative    Leukocyte Esterase Urine Negative Negative    Narrative     Microscopic not indicated

## 2022-11-29 ENCOUNTER — HOSPITAL ENCOUNTER (OUTPATIENT)
Dept: MRI IMAGING | Facility: OTHER | Age: 32
Discharge: HOME | End: 2022-11-29
Attending: SURGERY
Payer: COMMERCIAL

## 2022-11-29 ENCOUNTER — OFFICE VISIT (OUTPATIENT)
Dept: FAMILY MEDICINE | Facility: OTHER | Age: 32
End: 2022-11-29
Attending: FAMILY MEDICINE
Payer: COMMERCIAL

## 2022-11-29 VITALS
HEART RATE: 90 BPM | DIASTOLIC BLOOD PRESSURE: 80 MMHG | OXYGEN SATURATION: 97 % | RESPIRATION RATE: 16 BRPM | SYSTOLIC BLOOD PRESSURE: 122 MMHG | TEMPERATURE: 96.7 F

## 2022-11-29 DIAGNOSIS — R92.30 DENSE BREAST: ICD-10-CM

## 2022-11-29 DIAGNOSIS — Z80.3 FAMILY HISTORY OF BREAST CANCER: ICD-10-CM

## 2022-11-29 DIAGNOSIS — S16.1XXA STRAIN OF NECK MUSCLE, INITIAL ENCOUNTER: Primary | ICD-10-CM

## 2022-11-29 DIAGNOSIS — Z91.89 AT HIGH RISK FOR BREAST CANCER: ICD-10-CM

## 2022-11-29 DIAGNOSIS — Z12.39 ENCOUNTER FOR BREAST CANCER SCREENING USING NON-MAMMOGRAM MODALITY: ICD-10-CM

## 2022-11-29 PROBLEM — Z30.430 ENCOUNTER FOR INSERTION OF COPPER INTRAUTERINE CONTRACEPTIVE DEVICE (IUD): Status: RESOLVED | Noted: 2021-06-30 | Resolved: 2022-11-29

## 2022-11-29 PROCEDURE — A9575 INJ GADOTERATE MEGLUMI 0.1ML: HCPCS | Performed by: SURGERY

## 2022-11-29 PROCEDURE — 77049 MRI BREAST C-+ W/CAD BI: CPT

## 2022-11-29 PROCEDURE — 99213 OFFICE O/P EST LOW 20 MIN: CPT | Performed by: FAMILY MEDICINE

## 2022-11-29 PROCEDURE — 255N000002 HC RX 255 OP 636: Performed by: SURGERY

## 2022-11-29 RX ORDER — GADOTERATE MEGLUMINE 376.9 MG/ML
20 INJECTION INTRAVENOUS ONCE
Status: COMPLETED | OUTPATIENT
Start: 2022-11-29 | End: 2022-11-29

## 2022-11-29 RX ADMIN — GADOTERATE MEGLUMINE 20 ML: 376.9 INJECTION INTRAVENOUS at 15:43

## 2022-11-29 ASSESSMENT — PAIN SCALES - GENERAL: PAINLEVEL: NO PAIN (0)

## 2022-11-29 NOTE — NURSING NOTE
Chief Complaint   Patient presents with     Pain     Neck and upper back      Here today for PT referral for back and neck pain.     Medication Reconciliation: complete    Stephania Mcfarlane LPN

## 2022-12-03 DIAGNOSIS — F33.9 EPISODE OF RECURRENT MAJOR DEPRESSIVE DISORDER, UNSPECIFIED DEPRESSION EPISODE SEVERITY (H): ICD-10-CM

## 2022-12-03 DIAGNOSIS — F41.9 ANXIETY: ICD-10-CM

## 2022-12-06 NOTE — TELEPHONE ENCOUNTER
FLUoxetine (PROZAC) 20 MG capsule    Last Written Prescription Date:  2-  Last Fill Quantity: 90,   # refills: 1  Last Office Visit: 11-  Future Office visit:       Routing refill request to provider for review/approval because:

## 2022-12-08 ENCOUNTER — HOSPITAL ENCOUNTER (OUTPATIENT)
Dept: ULTRASOUND IMAGING | Facility: OTHER | Age: 32
Discharge: HOME | End: 2022-12-08
Attending: SURGERY | Admitting: SURGERY
Payer: COMMERCIAL

## 2022-12-08 DIAGNOSIS — R92.8 ABNORMAL FINDING ON BREAST IMAGING: ICD-10-CM

## 2022-12-08 PROCEDURE — 76642 ULTRASOUND BREAST LIMITED: CPT | Mod: LT

## 2022-12-20 ENCOUNTER — TRANSFERRED RECORDS (OUTPATIENT)
Dept: HEALTH INFORMATION MANAGEMENT | Facility: OTHER | Age: 32
End: 2022-12-20

## 2023-01-10 ENCOUNTER — HOSPITAL ENCOUNTER (OUTPATIENT)
Dept: PHYSICAL THERAPY | Facility: OTHER | Age: 33
Setting detail: THERAPIES SERIES
Discharge: HOME | End: 2023-01-10
Attending: FAMILY MEDICINE
Payer: COMMERCIAL

## 2023-01-10 DIAGNOSIS — S16.1XXA STRAIN OF NECK MUSCLE, INITIAL ENCOUNTER: ICD-10-CM

## 2023-01-10 PROCEDURE — 97161 PT EVAL LOW COMPLEX 20 MIN: CPT | Mod: GP

## 2023-01-10 PROCEDURE — 97140 MANUAL THERAPY 1/> REGIONS: CPT | Mod: GP

## 2023-01-10 PROCEDURE — 97112 NEUROMUSCULAR REEDUCATION: CPT | Mod: GP

## 2023-01-10 NOTE — PROGRESS NOTES
01/10/23 0800   General Information   Type of Visit Initial OP Ortho PT Evaluation   Start of Care Date 01/10/23   Referring Physician Lenora Osborne MD   Patient/Family Goals Statement reduce pain   Orders Evaluate and Treat   Date of Order 11/29/22   Certification Required? No   Medical Diagnosis strain of neck muscle   Surgical/Medical history reviewed Yes   Body Part(s)   Body Part(s) Lumbar Spine/SI   Presentation and Etiology   Pertinent history of current problem (include personal factors and/or comorbidities that impact the POC) Patient reports neck, upper back and low back pain that has been an issue for over two years but getting worse, she feels it is related to picking up her children (toddlers) and larger chest size. Plans to get a reduction done after she is finished having children. Reports no injuries. Gets headaches with neck pain, occiput, 3-4 times per week.   Impairments A. Pain;N. Headaches   Functional Limitations perform activities of daily living;perform required work activities;perform desired leisure / sports activities   Symptom Location neck, head, upper back, low back   Onset date of current episode/exacerbation 11/19/22   Chronicity Chronic   Pain rating (0-10 point scale) Best (/10);Worst (/10)   Best (/10) 0   Worst (/10) 4   Pain quality B. Dull;C. Aching;D. Burning   Frequency of pain/symptoms B. Intermittent   Pain/symptoms exacerbated by C. Lifting;D. Carrying;E. Rest;G. Certain positions;M. Other   Pain exacerbation comment sitting hurts low back   Pain/symptoms eased by G. Heat;I. OTC medication(s);K. Other   Pain eased by comment standing helps low back   Progression of symptoms since onset: Worsened   Prior Level of Function   Prior Level of Function-Mobility no limitations   Current Level of Function   Patient role/employment history A. Employed   Employment Comments MD   Fall Risk Screen   Fall screen completed by PT   Have you fallen 2 or more times in the past year?  No   Have you fallen and had an injury in the past year? No   Is patient a fall risk? No   Abuse Screen (yes response referral indicated)   Feels Unsafe at Home or Work/School no   Feels Threatened by Someone no   Does Anyone Try to Keep You From Having Contact with Others or Doing Things Outside Your Home? no   Physical Signs of Abuse Present no   Lumbar Spine/SI Objective Findings   Pelvic Screen - FFT, - stork test   Lumbar/Hip/Knee/Foot Strength Comments to be assessed at later date   Segmental Mobility ESlRr C0-1, ERS left C4   Palpation Postural loading limited head R/R shoulders L/L R/L L/R pelvis right; general listening to right head, chest wall; local listening to right temporal bone, sternum. Myofascial tightness with inferior sternopericardial ligament, clavipectoral fascia. Cranial screen: listening at vertex to right long curved, restricted sagittal suture with glide, no mobility of right temporal bone, restricted right tentorium cerebelli.   Posture rounded shoulders, right shoulder inferior to right   Planned Therapy Interventions   Planned Therapy Interventions manual therapy;neuromuscular re-education;strengthening;stretching   Planned Modality Interventions   Planned Modality Interventions Cryotherapy;Hot packs   Clinical Impression   Criteria for Skilled Therapeutic Interventions Met yes, treatment indicated   PT Diagnosis neck pain, headaches, low back pain, myofascial tightness, muscle weakness   Influenced by the following impairments pain, headaches   Functional limitations due to impairments headaches during day time, neck pain with lifting children, low back pain with sitting.   Clinical Presentation Stable/Uncomplicated   Clinical Decision Making (Complexity) Low complexity   Therapy Frequency 2 times/Week   Predicted Duration of Therapy Intervention (days/wks) 3 months as needed   Risk & Benefits of therapy have been explained Yes   Patient, Family & other staff in agreement with plan of  care Yes   Clinical Impression Comments Patient with neck pain, back pain, headaches related to myofascial tightness, muscle weakness.   ORTHO GOALS   PT Ortho Eval Goals 1;2;3;4   Ortho Goal 1   Goal Identifier headaches   Goal Description Patient to report reduction in headaches to one or less per week.   Target Date 04/10/23   Ortho Goal 2   Goal Identifier lifting   Goal Description Patient to tolerate lifting children without onset of neck pain.   Target Date 04/10/23   Ortho Goal 3   Goal Identifier sitting   Goal Description Patient to tolerate sitting to complete paperwork or driving without onset of back pain.   Target Date 04/10/23   Ortho Goal 4   Goal Identifier HEP   Goal Description Patient to be compliant with HEP for stretching and strengthening to self manage symptoms.   Target Date 04/10/23   Total Evaluation Time   PT Eval, Low Complexity Minutes (25796) 15

## 2023-01-12 ENCOUNTER — HOSPITAL ENCOUNTER (OUTPATIENT)
Dept: PHYSICAL THERAPY | Facility: OTHER | Age: 33
Setting detail: THERAPIES SERIES
Discharge: HOME | End: 2023-01-12
Attending: CHIROPRACTOR
Payer: COMMERCIAL

## 2023-01-12 PROCEDURE — 97110 THERAPEUTIC EXERCISES: CPT | Mod: GP

## 2023-01-12 PROCEDURE — 97112 NEUROMUSCULAR REEDUCATION: CPT | Mod: GP

## 2023-01-17 ENCOUNTER — HOSPITAL ENCOUNTER (OUTPATIENT)
Dept: PHYSICAL THERAPY | Facility: OTHER | Age: 33
Setting detail: THERAPIES SERIES
Discharge: HOME | End: 2023-01-17
Attending: FAMILY MEDICINE
Payer: COMMERCIAL

## 2023-01-17 ENCOUNTER — OFFICE VISIT (OUTPATIENT)
Dept: OBGYN | Facility: OTHER | Age: 33
End: 2023-01-17
Attending: OBSTETRICS & GYNECOLOGY

## 2023-01-17 VITALS
DIASTOLIC BLOOD PRESSURE: 80 MMHG | WEIGHT: 231 LBS | HEART RATE: 68 BPM | BODY MASS INDEX: 34.11 KG/M2 | SYSTOLIC BLOOD PRESSURE: 124 MMHG

## 2023-01-17 DIAGNOSIS — N92.6 IRREGULAR MENSES: Primary | ICD-10-CM

## 2023-01-17 DIAGNOSIS — Z87.59 HISTORY OF MISCARRIAGE: ICD-10-CM

## 2023-01-17 LAB — TSH SERPL DL<=0.005 MIU/L-ACNC: 1.62 UIU/ML (ref 0.3–4.2)

## 2023-01-17 PROCEDURE — 97112 NEUROMUSCULAR REEDUCATION: CPT | Mod: GP

## 2023-01-17 PROCEDURE — 36415 COLL VENOUS BLD VENIPUNCTURE: CPT | Mod: ZL | Performed by: OBSTETRICS & GYNECOLOGY

## 2023-01-17 PROCEDURE — 99213 OFFICE O/P EST LOW 20 MIN: CPT | Performed by: OBSTETRICS & GYNECOLOGY

## 2023-01-17 PROCEDURE — 97140 MANUAL THERAPY 1/> REGIONS: CPT | Mod: GP

## 2023-01-17 PROCEDURE — 84443 ASSAY THYROID STIM HORMONE: CPT | Mod: ZL | Performed by: OBSTETRICS & GYNECOLOGY

## 2023-01-17 RX ORDER — PRENATAL VIT/IRON FUM/FOLIC AC 27MG-0.8MG
1 TABLET ORAL DAILY
COMMUNITY

## 2023-01-17 RX ORDER — VITAMIN B COMPLEX
TABLET ORAL
COMMUNITY

## 2023-01-17 ASSESSMENT — PAIN SCALES - GENERAL: PAINLEVEL: NO PAIN (0)

## 2023-01-17 NOTE — NURSING NOTE
Chief Complaint   Patient presents with     Consult For     Pre Conception counseling        Medication Reconciliation: complete    Sara Lewis LPN........................1/17/2023  10:53 AM

## 2023-01-17 NOTE — PROGRESS NOTES
Gynecology Visit    CC: preconception, recurrent miscarriage    HPI:    Tabatha Greene is a 32 year old , here for the above concern.  She had her IUD removed 22, switched to NuvaRing for less than a month and self-discontinued due to side effects. She became pregnant but spontaneously miscarried at 5-6 weeks. She has a hx of two other early pregnancy losses prior to the birth of her son. Her cycles are about every 25 days but this is somewhat variable. She has concerns about her recurrent miscarriages and if further evaluation should be done.    OBHx  OB History    Para Term  AB Living   5 2 2 0 3 2   SAB IAB Ectopic Multiple Live Births   3 0 0 0 2      # Outcome Date GA Lbr Lorenzo/2nd Weight Sex Delivery Anes PTL Lv   5 SAB 22     SAB      4 Term 21 37w6d 02:54 / 00:09 2.89 kg (6 lb 5.9 oz) F Vag-Spont EPI N BARNEY      Complications: Anesthetic Complications      Name: PRITESH GREENEFEMALE-TABATHA      Apgar1: 7  Apgar5: 9   3 Term 20 37w1d 03:41 / 01:47 2.58 kg (5 lb 11 oz) M Vag-Spont EPI N BARNEY      Complications: Preeclampsia/Hypertension      Name: SUDHEER LOWE-TABATHA      Apgar1: 8  Apgar5: 9   2 SAB 19           1 SAB 19               PMHx:   Past Medical History:   Diagnosis Date     Anxiety      Asthma     exercise induced as child     Family history of malignant neoplasm of breast     mom and grandma; q6 month alternating mammo and MRI after done nursing     Hypertension     Gestational HTN      PSHx:   Past Surgical History:   Procedure Laterality Date     CLOSED REDUCTION DISTAL RADIUS FRACTURE  2005     COLONOSCOPY  2017      Meds:   Current Outpatient Medications   Medication     FLUoxetine (PROZAC) 20 MG capsule     Prenatal Vit-Fe Fumarate-FA (PRENATAL MULTIVITAMIN W/IRON) 27-0.8 MG tablet     Vitamin D3 (CHOLECALCIFEROL) 25 mcg (1000 units) tablet     No current facility-administered medications for this visit.        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     FamHx:   Family History   Problem Relation Age of Onset     Other - See Comments Mother         Ophthalmic Disease     Breast Cancer Mother 54     Hypertension Mother      Hyperlipidemia Father      No Known Problems Sister      Anxiety Disorder Brother      No Known Problems Brother      Other - See Comments Maternal Grandmother         Ophthalmic Disease     Breast Cancer Maternal Grandmother         77     Other - See Comments Maternal Grandfather         Ophthalmic Disease     Hyperlipidemia Maternal Grandfather      Hypertension Maternal Grandfather      GI problems Maternal Grandfather      Heart Disease Maternal Grandfather      Parkinsonism Paternal Grandmother      Cancer Paternal Grandfather      Cerebrovascular Disease Paternal Grandfather      Celiac Disease Cousin         paternal     No Known Problems Son      No Known Problems Daughter      Celiac Disease Paternal Aunt         Physical Exam  /80 (BP Location: Right arm, Patient Position: Sitting, Cuff Size: Adult Large)   Pulse 68   Wt 104.8 kg (231 lb)   LMP 2023 (Exact Date)   BMI 34.11 kg/m    Gen: Well-appearing, NAD  Resp: nonlabored  Psych: appropriate mood and affect      Assessment/Plan  Kizzy Chan is a 32 year old  female here for preconception and history of miscarriage. Discussed definition of recurrent miscarriage and when evaluation should begin. Discussed r/b of progesterone  supplementation of early pregnancy, controversial data but limited risk of harm. After counseling, will check TSH today and hold off on recurrent loss evaluation. If she miscarries again, recommend APAS testing and karyotype of her and her . She had a previously normal ultrasound that did not demonstrate fibroids or uterine septum. With next pregnancy, she may call if she wants to start vaginal progesterone supplementation.       Mayi Benavidez MD  OB/GYN  1/17/2023 12:57 PM

## 2023-01-19 ENCOUNTER — HOSPITAL ENCOUNTER (OUTPATIENT)
Dept: PHYSICAL THERAPY | Facility: OTHER | Age: 33
Setting detail: THERAPIES SERIES
Discharge: HOME | End: 2023-01-19
Attending: FAMILY MEDICINE
Payer: COMMERCIAL

## 2023-01-19 PROCEDURE — 97140 MANUAL THERAPY 1/> REGIONS: CPT | Mod: GP

## 2023-01-31 ENCOUNTER — HOSPITAL ENCOUNTER (OUTPATIENT)
Dept: PHYSICAL THERAPY | Facility: OTHER | Age: 33
Setting detail: THERAPIES SERIES
Discharge: HOME | End: 2023-01-31
Attending: FAMILY MEDICINE
Payer: COMMERCIAL

## 2023-01-31 PROCEDURE — 97140 MANUAL THERAPY 1/> REGIONS: CPT | Mod: GP

## 2023-01-31 NOTE — PROGRESS NOTES
[facility]    Outpatient Physical Therapy Discharge Note  Patient: Kizzy Chan  : 1990    Beginning/End Dates of Reporting Period:  1/10/23 to 23    Referring Provider: Lenora Osborne MD    Therapy Diagnosis: neck pain, headaches, low back pain, myofascial tightness, muscle weakness     Client Self Report: (P) Traveled to [place], made neck sore how she was sitting but doing better now. No other issues. No headaches. HEP is very helpful. Comfortable with ending PT.     Objective Measurements:  Objective Measure: postural loading  Details: (P) equal loading, listening to anterior neck  Objective Measure: cranial  Details: clear  Objective Measure: myofascial  Details: (P) mideel cervical fascia (thyroid, hyoid)        Goals:  Goal Identifier headaches   Goal Description Patient to report reduction in headaches to one or less per week.   Target Date 04/10/23   Date Met   23   Progress (detail required for progress note):       Goal Identifier lifting   Goal Description Patient to tolerate lifting children without onset of neck pain.   Target Date 04/10/23   Date Met   23   Progress (detail required for progress note):       Goal Identifier sitting   Goal Description Patient to tolerate sitting to complete paperwork or driving without onset of back pain.   Target Date 04/10/23   Date Met   23   Progress (detail required for progress note):      Goal Identifier HEP   Goal Description Patient to be compliant with HEP for stretching and strengthening to self manage symptoms.   Target Date 04/10/23   Date Met   23   Progress (detail required for progress note):           Plan:  Discharge from therapy.    Discharge:    Reason for Discharge: Patient has met all goals.    Equipment Issued: NA    Discharge Plan: Patient to continue home program.

## 2023-06-06 ENCOUNTER — HOSPITAL ENCOUNTER (OUTPATIENT)
Dept: MRI IMAGING | Facility: OTHER | Age: 33
Discharge: HOME | End: 2023-06-06
Attending: SURGERY | Admitting: SURGERY
Payer: COMMERCIAL

## 2023-06-06 DIAGNOSIS — Z80.3 FAMILY HISTORY OF BREAST CANCER: ICD-10-CM

## 2023-06-06 DIAGNOSIS — Z91.89 AT HIGH RISK FOR BREAST CANCER: ICD-10-CM

## 2023-06-06 DIAGNOSIS — Z09 FOLLOW-UP EXAM, 3-6 MONTHS SINCE PREVIOUS EXAM: ICD-10-CM

## 2023-06-06 DIAGNOSIS — R92.8 ABNORMAL FINDING ON BREAST IMAGING: ICD-10-CM

## 2023-06-06 PROCEDURE — 77049 MRI BREAST C-+ W/CAD BI: CPT

## 2023-06-06 PROCEDURE — 255N000002 HC RX 255 OP 636: Performed by: SURGERY

## 2023-06-06 PROCEDURE — A9575 INJ GADOTERATE MEGLUMI 0.1ML: HCPCS | Performed by: SURGERY

## 2023-06-06 RX ADMIN — GADOTERATE MEGLUMINE 20 ML: 376.9 INJECTION INTRAVENOUS at 09:36

## 2023-07-14 DIAGNOSIS — F41.9 ANXIETY: ICD-10-CM

## 2023-07-14 DIAGNOSIS — F33.9 EPISODE OF RECURRENT MAJOR DEPRESSIVE DISORDER, UNSPECIFIED DEPRESSION EPISODE SEVERITY (H): ICD-10-CM

## 2023-07-19 NOTE — TELEPHONE ENCOUNTER
Last Prescription Date: 12/06/2022  Last Qty/Refills: 90 / R-1  Last Office Visit: 11/29/2022  Future Office Visit: 7/25/2023    Arline Quevedo RN on 7/19/2023 at 9:39 AM     Requested Prescriptions   Pending Prescriptions Disp Refills     FLUoxetine (PROZAC) 20 MG capsule [Pharmacy Med Name: fluoxetine 20 mg capsule] 90 capsule 1     Sig: TAKE 1 CAPSULE BY MOUTH DAILY       SSRIs Protocol Failed - 7/19/2023  9:37 AM        Failed - PHQ-9 score less than 5 in past 6 months     Please review last PHQ-9 score.

## 2023-07-25 ENCOUNTER — OFFICE VISIT (OUTPATIENT)
Dept: FAMILY MEDICINE | Facility: OTHER | Age: 33
End: 2023-07-25
Attending: FAMILY MEDICINE
Payer: COMMERCIAL

## 2023-07-25 VITALS
HEART RATE: 81 BPM | RESPIRATION RATE: 16 BRPM | DIASTOLIC BLOOD PRESSURE: 80 MMHG | TEMPERATURE: 98.6 F | SYSTOLIC BLOOD PRESSURE: 124 MMHG | OXYGEN SATURATION: 97 % | WEIGHT: 241.2 LBS | BODY MASS INDEX: 35.62 KG/M2

## 2023-07-25 DIAGNOSIS — N92.6 IRREGULAR PERIODS: ICD-10-CM

## 2023-07-25 DIAGNOSIS — R63.5 WEIGHT GAIN: Primary | ICD-10-CM

## 2023-07-25 LAB
FASTING STATUS PATIENT QL REPORTED: NO
GLUCOSE SERPL-MCNC: 122 MG/DL (ref 70–99)
HBA1C MFR BLD: 5.3 % (ref 4–6.2)

## 2023-07-25 PROCEDURE — 99213 OFFICE O/P EST LOW 20 MIN: CPT | Performed by: FAMILY MEDICINE

## 2023-07-25 PROCEDURE — 82947 ASSAY GLUCOSE BLOOD QUANT: CPT | Mod: ZL | Performed by: FAMILY MEDICINE

## 2023-07-25 PROCEDURE — 83525 ASSAY OF INSULIN: CPT | Mod: ZL | Performed by: FAMILY MEDICINE

## 2023-07-25 PROCEDURE — 83036 HEMOGLOBIN GLYCOSYLATED A1C: CPT | Mod: ZL | Performed by: FAMILY MEDICINE

## 2023-07-25 PROCEDURE — 36415 COLL VENOUS BLD VENIPUNCTURE: CPT | Mod: ZL | Performed by: FAMILY MEDICINE

## 2023-07-25 RX ORDER — METFORMIN HCL 500 MG
500 TABLET, EXTENDED RELEASE 24 HR ORAL
Qty: 90 TABLET | Refills: 3 | Status: SHIPPED | OUTPATIENT
Start: 2023-07-25 | End: 2023-09-05

## 2023-07-25 ASSESSMENT — ANXIETY QUESTIONNAIRES
IF YOU CHECKED OFF ANY PROBLEMS ON THIS QUESTIONNAIRE, HOW DIFFICULT HAVE THESE PROBLEMS MADE IT FOR YOU TO DO YOUR WORK, TAKE CARE OF THINGS AT HOME, OR GET ALONG WITH OTHER PEOPLE: NOT DIFFICULT AT ALL
1. FEELING NERVOUS, ANXIOUS, OR ON EDGE: NOT AT ALL
7. FEELING AFRAID AS IF SOMETHING AWFUL MIGHT HAPPEN: SEVERAL DAYS
GAD7 TOTAL SCORE: 2
3. WORRYING TOO MUCH ABOUT DIFFERENT THINGS: SEVERAL DAYS
5. BEING SO RESTLESS THAT IT IS HARD TO SIT STILL: NOT AT ALL
2. NOT BEING ABLE TO STOP OR CONTROL WORRYING: NOT AT ALL
4. TROUBLE RELAXING: NOT AT ALL
GAD7 TOTAL SCORE: 2
6. BECOMING EASILY ANNOYED OR IRRITABLE: NOT AT ALL

## 2023-07-25 ASSESSMENT — PAIN SCALES - GENERAL: PAINLEVEL: NO PAIN (0)

## 2023-07-25 ASSESSMENT — PATIENT HEALTH QUESTIONNAIRE - PHQ9
SUM OF ALL RESPONSES TO PHQ QUESTIONS 1-9: 4
SUM OF ALL RESPONSES TO PHQ QUESTIONS 1-9: 4
10. IF YOU CHECKED OFF ANY PROBLEMS, HOW DIFFICULT HAVE THESE PROBLEMS MADE IT FOR YOU TO DO YOUR WORK, TAKE CARE OF THINGS AT HOME, OR GET ALONG WITH OTHER PEOPLE: NOT DIFFICULT AT ALL

## 2023-07-25 NOTE — NURSING NOTE
Chief Complaint   Patient presents with    Weight Check         Medication Reconciliation: complete    Stephania Mcfarlane, LPN

## 2023-07-25 NOTE — PROGRESS NOTES
Nursing Notes:   Stephania Mcfarlane LPN  7/25/2023  1:12 PM  Signed  Chief Complaint   Patient presents with    Weight Check         Medication Reconciliation: complete    Stephania MONTAÑO. JEROME Mcfarlane          Palak Durand is a 32 year old, presenting for the following health issues:  Weight Check        7/25/2023     1:11 PM   Additional Questions   Roomed by Stephania Mcfarlane     Kizzy is here today to discuss her frustrations with weight gain.  Has tried Noom, which didn't help.  Has tried a weight loss podcast for busy physicians.  She implemented the suggestions, which didn't help.  Has tried working with a .  Has tried working with a therapist.  Prozac helps anxiety, but more irritable off of it.  Weight gain has not been worse since on prozac.  Has tried [employer] work outs, beach body works outs.  Has reached out to a virtual dietician, but has not heard back from them about setting up an appointment.  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  She finds herself often binging after her kids are in bed for the night, which frustrates her.  She tries exercising in the evenings to try to stop herself from doing this, but often loses her motivation if she is tired.    She and her  are trying to get pregnant.  Had her third miscarriage in December.  Periods have been very irreg since gaining weight.  TSH on 1/17/23 was normal.  Has no prior diagnosis of PCOS that she is aware of.    History of Present Illness       Reason for visit:  Weight    She eats 2-3 servings of fruits and vegetables daily.She consumes 0 sweetened beverage(s) daily.She exercises with enough effort to increase her heart rate 9 or less minutes per day.  She exercises with enough effort to increase her heart rate 3 or less days per week.   She is taking medications regularly.       Follow-up weight         Review of Systems   Constitutional, HEENT, cardiovascular, pulmonary, gi and gu systems are negative, except as otherwise  noted.      Objective    /80   Pulse 81   Temp 98.6  F (37  C) (Tympanic)   Resp 16   Wt 109.4 kg (241 lb 3.2 oz)   SpO2 97%   Breastfeeding No   BMI 35.62 kg/m    Body mass index is 35.62 kg/m .  Physical Exam  Constitutional:       General: She is not in acute distress.     Appearance: Normal appearance. She is obese. She is not toxic-appearing or diaphoretic.   HENT:      Head: Normocephalic.   Eyes:      Extraocular Movements: Extraocular movements intact.      Pupils: Pupils are equal, round, and reactive to light.   Cardiovascular:      Rate and Rhythm: Normal rate and regular rhythm.      Heart sounds: Normal heart sounds. No murmur heard.  Pulmonary:      Effort: Pulmonary effort is normal.      Breath sounds: Normal breath sounds. No wheezing, rhonchi or rales.   Neurological:      Mental Status: She is alert.   Psychiatric:         Mood and Affect: Mood normal.         Behavior: Behavior normal.               Assessment & Plan     ICD-10-CM    1. Weight gain  R63.5 Insulin level     Glucose     Hemoglobin A1c     metFORMIN (GLUCOPHAGE XR) 500 MG 24 hr tablet     Insulin level     Glucose     Hemoglobin A1c      2. Irregular periods  N92.6 metFORMIN (GLUCOPHAGE XR) 500 MG 24 hr tablet           Labs completed as above.  She feels that she has a component of binge eating as well.  Discussed considering seeking virtual treatment possibly through the [facility]. She is going to consider this.  As she is seeking another pregnancy, weight loss medication options would be limited but we discussed the possibility of using metformin, which could help with insulin resistance, possibly help with some weight loss as well as helping with anovulation.  Prescription for Metformin  mg daily provided.  See #1.             Encouraged regular exercise.     No follow-ups on file.    Lenora Osborne MD  [facility]

## 2023-07-26 LAB — INSULIN SERPL-ACNC: 84.6 UU/ML (ref 2.6–24.9)

## 2023-08-02 ENCOUNTER — TELEPHONE (OUTPATIENT)
Dept: OBGYN | Facility: OTHER | Age: 33
End: 2023-08-02
Payer: COMMERCIAL

## 2023-08-02 DIAGNOSIS — Z87.59 HISTORY OF MISCARRIAGE: Primary | ICD-10-CM

## 2023-08-02 NOTE — TELEPHONE ENCOUNTER
Patient called with positive pregnancy test. Wants to try vaginal progesterone, understands that data is limited and controversial.  Rx sent.  Mayi Benavidez MD FACOG  OB/GYN  8/2/2023 10:32 AM

## 2023-08-21 ASSESSMENT — PATIENT HEALTH QUESTIONNAIRE - PHQ9
10. IF YOU CHECKED OFF ANY PROBLEMS, HOW DIFFICULT HAVE THESE PROBLEMS MADE IT FOR YOU TO DO YOUR WORK, TAKE CARE OF THINGS AT HOME, OR GET ALONG WITH OTHER PEOPLE: NOT DIFFICULT AT ALL
SUM OF ALL RESPONSES TO PHQ QUESTIONS 1-9: 0
SUM OF ALL RESPONSES TO PHQ QUESTIONS 1-9: 0

## 2023-08-22 ENCOUNTER — VIRTUAL VISIT (OUTPATIENT)
Dept: OBGYN | Facility: OTHER | Age: 33
End: 2023-08-22
Attending: OBSTETRICS & GYNECOLOGY
Payer: COMMERCIAL

## 2023-08-22 DIAGNOSIS — O36.80X0 ENCOUNTER TO DETERMINE FETAL VIABILITY OF PREGNANCY, SINGLE OR UNSPECIFIED FETUS: Primary | ICD-10-CM

## 2023-08-22 PROCEDURE — 99207 PR OB VISIT-NO CHARGE - GICH ONLY: CPT

## 2023-08-22 ASSESSMENT — ANXIETY QUESTIONNAIRES
1. FEELING NERVOUS, ANXIOUS, OR ON EDGE: SEVERAL DAYS
3. WORRYING TOO MUCH ABOUT DIFFERENT THINGS: NOT AT ALL
7. FEELING AFRAID AS IF SOMETHING AWFUL MIGHT HAPPEN: SEVERAL DAYS
6. BECOMING EASILY ANNOYED OR IRRITABLE: SEVERAL DAYS
GAD7 TOTAL SCORE: 3
2. NOT BEING ABLE TO STOP OR CONTROL WORRYING: NOT AT ALL
GAD7 TOTAL SCORE: 3
5. BEING SO RESTLESS THAT IT IS HARD TO SIT STILL: NOT AT ALL
IF YOU CHECKED OFF ANY PROBLEMS ON THIS QUESTIONNAIRE, HOW DIFFICULT HAVE THESE PROBLEMS MADE IT FOR YOU TO DO YOUR WORK, TAKE CARE OF THINGS AT HOME, OR GET ALONG WITH OTHER PEOPLE: NOT DIFFICULT AT ALL

## 2023-08-22 ASSESSMENT — PATIENT HEALTH QUESTIONNAIRE - PHQ9: 5. POOR APPETITE OR OVEREATING: NOT AT ALL

## 2023-08-22 NOTE — PROGRESS NOTES
Verbal consent obtained for telephone visit. Total length of call: 20 min    HPI:    This is a 32 year old female patient,  who was called today for OB Intake visit. Patient reports positive pregnancy test at home.     Obstetrical history and OB Demographics updated to the best of this nurse's ability based on patient report. PHQ-9 depression screening and routine Domestic Abuse screening completed. All immediate questions and concerns answered.    FOOD SECURITY SCREENING QUESTIONS  Hunger Vital Signs:  Within the past 12 months we worried whether our food would run out before we got money to buy more. Never  Within the past 12 months the food we bought just didn't last and we didn't have money to get more. Never    Last menstrual period is reported as Patient's last menstrual period was 2023. JACOB based on LMP is Estimated Date of Delivery: 2024.  Her cycles are irregular.  Her last menstrual period was abnormal.   Since her LMP, she has experienced  abdominal pain, vaginal discharge, pelvic pain, and vaginal bleeding.       OBSTETRIC HISTORY:    OB History    Para Term  AB Living   6 2 2 0 3 2   SAB IAB Ectopic Multiple Live Births   3 0 0 0 2      # Outcome Date GA Lbr Lorenzo/2nd Weight Sex Delivery Anes PTL Lv   6 Current            5 SAB 22     SAB      4 Term 21 37w6d 02:54 / 00:09 2.89 kg (6 lb 5.9 oz) F Vag-Spont EPI N BARNEY      Complications: Anesthetic Complications      Name: ROBERT LOWE      Apgar1: 7  Apgar5: 9   3 Term 20 37w1d 03:41 / 01:47 2.58 kg (5 lb 11 oz) M Vag-Spont EPI N BARNEY      Complications: Preeclampsia/Hypertension      Name: JORGITO LOWE      Apgar1: 8  Apgar5: 9   2 SAB 19           1 SAB 19               Age of first pregnancy: 28  Previous OB Provider: Dr. Mayi Castellanos  Previous Delivering Clinic: 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   Family and father of baby is supportive of current pregnancy.  Past Medical History of Father of Baby:No significant medical history    Past History:  Her past medical history   Past Medical History:   Diagnosis Date    Anxiety     Asthma     exercise induced as child    Family history of malignant neoplasm of breast     mom and grandma; q6 month alternating mammo and MRI after done nursing    Hypertension     Gestational HTN   .      Her past surgical history:   Past Surgical History:   Procedure Laterality Date    CLOSED REDUCTION DISTAL RADIUS FRACTURE  03/26/2005    COLONOSCOPY  01/09/2017       She has a history of  hypertension, pregnancy induced hypertension, and preeclampsia    Since her last LMP she denies use of alcohol, tobacco and street drugs.    Pap smear history: Last pap was 06/30/21, next due 06/2024    STD/STI history: No STD history    STD/STI symptoms: no noticeable symptoms     Past medical, surgical, social and family history were reviewed and updated in EPIC.    Medications reviewed by this nurse. Current medication list:  Current Outpatient Medications   Medication Sig Dispense Refill    FLUoxetine (PROZAC) 20 MG capsule TAKE 1 CAPSULE BY MOUTH DAILY 90 capsule 1    Prenatal Vit-Fe Fumarate-FA (PRENATAL MULTIVITAMIN W/IRON) 27-0.8 MG tablet Take 1 tablet by mouth daily      progesterone (ENDOMETRIN) 100 MG vaginal  insert Place 1 suppository (100 mg) vaginally daily 90 suppository 1    Vitamin D3 (CHOLECALCIFEROL) 25 mcg (1000 units) tablet       metFORMIN (GLUCOPHAGE XR) 500 MG 24 hr tablet Take 1 tablet (500 mg) by mouth daily (with dinner) 90 tablet 3     The following medications were recommended to be discontinued due to Pregnancy Category D status: progesterone   Patient informed to contact her primary care provider as soon as possible to discuss a safer alternative.    Risk factors:  Moderate and moderately severe risks (consult with OB/Gyn)  Previous fetal or  demise: No  History of  delivery: No  History of heart disease Class I: No  Severe anemia, unresponsive to iron therapy: No  Pelvic mass or neoplasm: No  Previous : No  Hyper/hypothyroidism: No  History of postpartum hemorrhage requiring transfusion:No  History of Placenta Accreta: No    High Risk (Pregnancy managed by OB/Gyn)  Multiple pregnancy: No  Pre-gestational diabetes: No  Chronic Hypertension: No  Renal Failure: No  Heart disease, class II or greater: No  Rh Isoimmunization: No  Chronic active hepatitis: No  Convulsive disorder, poorly controlled: No  Isoimmune thrombocytopenia: No  Pre-term premature rupture of membranes: No  Lupus or other autoimmune disorder: No  Human Immunodeficiency Virus: No      ASSESSMENT/PLAN:       ICD-10-CM    1. Encounter to determine fetal viability of pregnancy, single or unspecified fetus  O36.80X0           32 year old , 6w5d of pregnancy with JACOB of 2024, by Last Menstrual Period    Per standing orders and scope of practice of this nurse, patient will have the following orders placed and completed prior to initial OB visit with the appropriate provider:    --early ultrasound for dating and viability ordered for 6+ weeks gestation based on LMP    --Quantitative Beta HCG and progesterone monitoring if indicated    Counseling given:     - Recommended weight gain for pregnancy: 25-35  lbs.   BMI < 18.5  28-40 lbs   18.5 - 24.9 25-35   25 - 29.9 15-25   > 30  < 15       PLAN/PATIENT INSTRUCTIONS:    Normal exercise.  Normal sexual activity.  Prenatal vitamins.  Anticipated weight gain.    follow-up appointment with Dr. Mayi Benavidez MD for pre-brown care and take multivitamin or pre-brown vitamins    Nya Valenzuela RN.................................................. 2023 9:32 AM

## 2023-09-04 LAB
ABO/RH(D): NORMAL
ANTIBODY SCREEN: NEGATIVE
SPECIMEN EXPIRATION DATE: NORMAL

## 2023-09-05 ENCOUNTER — PRENATAL OFFICE VISIT (OUTPATIENT)
Dept: OBGYN | Facility: OTHER | Age: 33
End: 2023-09-05
Attending: OBSTETRICS & GYNECOLOGY
Payer: COMMERCIAL

## 2023-09-05 ENCOUNTER — HOSPITAL ENCOUNTER (OUTPATIENT)
Dept: ULTRASOUND IMAGING | Facility: OTHER | Age: 33
Discharge: HOME | End: 2023-09-05
Attending: OBSTETRICS & GYNECOLOGY
Payer: COMMERCIAL

## 2023-09-05 VITALS
HEIGHT: 69 IN | BODY MASS INDEX: 36.11 KG/M2 | WEIGHT: 243.8 LBS | DIASTOLIC BLOOD PRESSURE: 90 MMHG | SYSTOLIC BLOOD PRESSURE: 142 MMHG | HEART RATE: 93 BPM

## 2023-09-05 DIAGNOSIS — Z34.81 ENCOUNTER FOR SUPERVISION OF OTHER NORMAL PREGNANCY IN FIRST TRIMESTER: Primary | ICD-10-CM

## 2023-09-05 DIAGNOSIS — O36.80X0 ENCOUNTER TO DETERMINE FETAL VIABILITY OF PREGNANCY, SINGLE OR UNSPECIFIED FETUS: ICD-10-CM

## 2023-09-05 LAB
ALBUMIN SERPL BCG-MCNC: 4.1 G/DL (ref 3.5–5.2)
ALP SERPL-CCNC: 63 U/L (ref 35–104)
ALT SERPL W P-5'-P-CCNC: 22 U/L (ref 0–50)
ANION GAP SERPL CALCULATED.3IONS-SCNC: 9 MMOL/L (ref 7–15)
AST SERPL W P-5'-P-CCNC: 18 U/L (ref 0–45)
BASOPHILS # BLD AUTO: 0.1 10E3/UL (ref 0–0.2)
BASOPHILS NFR BLD AUTO: 1 %
BILIRUB SERPL-MCNC: 0.2 MG/DL
BUN SERPL-MCNC: 11.2 MG/DL (ref 6–20)
C TRACH DNA SPEC QL PROBE+SIG AMP: NEGATIVE
CALCIUM SERPL-MCNC: 9.6 MG/DL (ref 8.6–10)
CHLORIDE SERPL-SCNC: 104 MMOL/L (ref 98–107)
CREAT SERPL-MCNC: 0.63 MG/DL (ref 0.51–0.95)
DEPRECATED HCO3 PLAS-SCNC: 21 MMOL/L (ref 22–29)
EOSINOPHIL # BLD AUTO: 0.1 10E3/UL (ref 0–0.7)
EOSINOPHIL NFR BLD AUTO: 1 %
ERYTHROCYTE [DISTWIDTH] IN BLOOD BY AUTOMATED COUNT: 12.1 % (ref 10–15)
GFR SERPL CREATININE-BSD FRML MDRD: >90 ML/MIN/1.73M2
GLUCOSE SERPL-MCNC: 95 MG/DL (ref 70–99)
HCT VFR BLD AUTO: 38.6 % (ref 35–47)
HGB BLD-MCNC: 13.3 G/DL (ref 11.7–15.7)
HOLD SPECIMEN: NORMAL
IMM GRANULOCYTES # BLD: 0 10E3/UL
IMM GRANULOCYTES NFR BLD: 0 %
LYMPHOCYTES # BLD AUTO: 2.9 10E3/UL (ref 0.8–5.3)
LYMPHOCYTES NFR BLD AUTO: 25 %
MCH RBC QN AUTO: 29.7 PG (ref 26.5–33)
MCHC RBC AUTO-ENTMCNC: 34.5 G/DL (ref 31.5–36.5)
MCV RBC AUTO: 86 FL (ref 78–100)
MONOCYTES # BLD AUTO: 0.8 10E3/UL (ref 0–1.3)
MONOCYTES NFR BLD AUTO: 7 %
N GONORRHOEA DNA SPEC QL NAA+PROBE: NEGATIVE
NEUTROPHILS # BLD AUTO: 7.6 10E3/UL (ref 1.6–8.3)
NEUTROPHILS NFR BLD AUTO: 66 %
NRBC # BLD AUTO: 0 10E3/UL
NRBC BLD AUTO-RTO: 0 /100
PLATELET # BLD AUTO: 251 10E3/UL (ref 150–450)
POTASSIUM SERPL-SCNC: 4 MMOL/L (ref 3.4–5.3)
PROT SERPL-MCNC: 7 G/DL (ref 6.4–8.3)
RBC # BLD AUTO: 4.48 10E6/UL (ref 3.8–5.2)
SODIUM SERPL-SCNC: 134 MMOL/L (ref 136–145)
WBC # BLD AUTO: 11.4 10E3/UL (ref 4–11)

## 2023-09-05 PROCEDURE — 85025 COMPLETE CBC W/AUTO DIFF WBC: CPT | Mod: ZL | Performed by: OBSTETRICS & GYNECOLOGY

## 2023-09-05 PROCEDURE — 99207 PR OB VISIT-NO CHARGE - GICH ONLY: CPT | Performed by: OBSTETRICS & GYNECOLOGY

## 2023-09-05 PROCEDURE — 87086 URINE CULTURE/COLONY COUNT: CPT | Mod: ZL | Performed by: OBSTETRICS & GYNECOLOGY

## 2023-09-05 PROCEDURE — 87389 HIV-1 AG W/HIV-1&-2 AB AG IA: CPT | Mod: ZL | Performed by: OBSTETRICS & GYNECOLOGY

## 2023-09-05 PROCEDURE — 87340 HEPATITIS B SURFACE AG IA: CPT | Mod: ZL | Performed by: OBSTETRICS & GYNECOLOGY

## 2023-09-05 PROCEDURE — 86780 TREPONEMA PALLIDUM: CPT | Mod: ZL | Performed by: OBSTETRICS & GYNECOLOGY

## 2023-09-05 PROCEDURE — 76801 OB US < 14 WKS SINGLE FETUS: CPT

## 2023-09-05 PROCEDURE — 87591 N.GONORRHOEAE DNA AMP PROB: CPT | Mod: ZL | Performed by: OBSTETRICS & GYNECOLOGY

## 2023-09-05 PROCEDURE — 87491 CHLMYD TRACH DNA AMP PROBE: CPT | Mod: ZL | Performed by: OBSTETRICS & GYNECOLOGY

## 2023-09-05 PROCEDURE — 86901 BLOOD TYPING SEROLOGIC RH(D): CPT | Mod: ZL | Performed by: OBSTETRICS & GYNECOLOGY

## 2023-09-05 PROCEDURE — 86762 RUBELLA ANTIBODY: CPT | Mod: ZL | Performed by: OBSTETRICS & GYNECOLOGY

## 2023-09-05 PROCEDURE — 84156 ASSAY OF PROTEIN URINE: CPT | Mod: ZL | Performed by: OBSTETRICS & GYNECOLOGY

## 2023-09-05 PROCEDURE — 86803 HEPATITIS C AB TEST: CPT | Mod: ZL | Performed by: OBSTETRICS & GYNECOLOGY

## 2023-09-05 PROCEDURE — 86850 RBC ANTIBODY SCREEN: CPT | Mod: ZL | Performed by: OBSTETRICS & GYNECOLOGY

## 2023-09-05 PROCEDURE — 80053 COMPREHEN METABOLIC PANEL: CPT | Mod: ZL | Performed by: OBSTETRICS & GYNECOLOGY

## 2023-09-05 PROCEDURE — 36415 COLL VENOUS BLD VENIPUNCTURE: CPT | Mod: ZL | Performed by: OBSTETRICS & GYNECOLOGY

## 2023-09-05 NOTE — PROGRESS NOTES
New Obstetrics Visit    HPI: 32 year old  at 9w1d by 9w1d US here today for initial OB visit. Her LMP was 23 but had spotting for a week prior to her period, which was unusual. Cycles usually 25 days, but have fluctuated over the past year. This was a planned pregnancy. Has been feeling really well. Nauseated but less than prior pregnancies. No cramping or VB.    OBHx  OB History    Para Term  AB Living   6 2 2 0 3 2   SAB IAB Ectopic Multiple Live Births   3 0 0 0 2      # Outcome Date GA Lbr Lorenzo/2nd Weight Sex Delivery Anes PTL Lv   6 Current            5 SAB 22     SAB      4 Term 21 37w6d 02:54 / 00:09 2.89 kg (6 lb 5.9 oz) F Vag-Spont EPI N BARNEY      Complications: Anesthetic Complications      Name: WILMA LOWE-TABATHA      Apgar1: 7  Apgar5: 9   3 Term 20 37w1d 03:41 / 01:47 2.58 kg (5 lb 11 oz) M Vag-Spont EPI N BARNEY      Complications: Preeclampsia/Hypertension      Name: SUDHEER LOWE-TABATHA      Apgar1: 8  Apgar5: 9   2 SAB 19           1 SAB 19               PMHx:   Past Medical History:   Diagnosis Date    Anxiety     Asthma     exercise induced as child    Carrier of group B Streptococcus     Complication of anesthesia     Family history of malignant neoplasm of breast     mom and grandma; q6 month alternating mammo and MRI after done nursing    Hypertension     Gestational HTN      PSHx:   Past Surgical History:   Procedure Laterality Date    CLOSED REDUCTION DISTAL RADIUS FRACTURE  2005    COLONOSCOPY  2017      Meds:   Current Outpatient Medications   Medication    FLUoxetine (PROZAC) 20 MG capsule    Prenatal Vit-Fe Fumarate-FA (PRENATAL MULTIVITAMIN W/IRON) 27-0.8 MG tablet    progesterone (ENDOMETRIN) 100 MG vaginal insert    Vitamin D3 (CHOLECALCIFEROL) 25 mcg (1000 units) tablet     No current facility-administered medications for this visit.     Allergies:     Allergies   Allergen Reactions    Ceftriaxone Rash      "Had a rash possibly from this injection 9-09    Nickel Rash    Penicillins Rash       SocHx:   Social History     Tobacco Use    Smoking status: Never    Smokeless tobacco: Never   Vaping Use    Vaping Use: Never used   Substance Use Topics    Alcohol use: Not Currently     Alcohol/week: 2.0 standard drinks of alcohol     Types: 2 Glasses of wine per week     Comment: 2 glass wine a week    Drug use: Never     . Lives with her  and 2 kids. 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 [facility]    FamHx:   Family History   Problem Relation Age of Onset    Other - See Comments Mother         Ophthalmic Disease    Breast Cancer Mother 54    Hypertension Mother     Hyperlipidemia Father     No Known Problems Sister     Anxiety Disorder Brother     No Known Problems Brother     Other - See Comments Maternal Grandmother         Ophthalmic Disease    Breast Cancer Maternal Grandmother         77    Other - See Comments Maternal Grandfather         Ophthalmic Disease    Hyperlipidemia Maternal Grandfather     Hypertension Maternal Grandfather     GI problems Maternal Grandfather     Heart Disease Maternal Grandfather     Parkinsonism Paternal Grandmother     Cancer Paternal Grandfather     Cerebrovascular Disease Paternal Grandfather     No Known Problems Daughter     No Known Problems Son     Celiac Disease Paternal Aunt     Celiac Disease Cousin         paternal        ROS: 10-Point ROS negative except as noted in HPI      Physical Exam  BP (!) 142/90   Pulse 93   Ht 1.753 m (5' 9\")   Wt 110.6 kg (243 lb 12.8 oz)   LMP 07/06/2023   Breastfeeding No   BMI 36.00 kg/m    Body mass index is 36 kg/m .  Gen: Well-appearing, NAD  HEENT: Normocephalic, atraumatic  Neck: Thyroid is not enlarged, no appreciable masses palpated. Non-tender  CV:  RRR, no m/r/g auscultated  Pulm: CTAB, no w/r/r auscultated  Abd: Soft, non-tender, non-distended  Ext: No LE edema, extremities warm and well perfused  Pelvic:  declined      Assessment/Plan:  Ms. " Kizzy Chan is a 32 year old  at 9w1d by 9w1d US, here for new OB visit.   1. Hx of recurrent pregnancy loss: using progesterone suppositories, plan to continue until 12 weeks  2. Hx of Gestational HTN:   - baseline HELLP labs, UPC today.   - BP elevated today but also anxious, has a cuff at home to continue to monitor  - recommend starting ASA 81mg at 12 weeks    3. PNC: New OB Labs ord'd  4. Genetics: desires- will have drawn in 2 weeks  5. Imaging: dating US at 9w1d   6. Immunizations: s/p COVID series    Follow up in 4 weeks.    Mayi Benavidez MD  OB/GYN  2023 11:34 AM

## 2023-09-05 NOTE — NURSING NOTE
Chief Complaint   Patient presents with    Prenatal Care     OBPX 7w5d     Patient presents to the clinic for OBPX 7w5d patient stated that she does have occasional nausea     Sabina Kenyon LPN       FOOD SECURITY SCREENING QUESTIONS:    The next two questions are to help us understand your food security.  If you are feeling you need any assistance in this area, we have resources available to support you today.    Hunger Vital Signs:  Within the past 12 months we worried whether our food would run out before we got money to buy more. Never  Within the past 12 months the food we bought just didn't last and we didn't have money to get more. Never    Food Insecurity: Not on file

## 2023-09-06 LAB
ALBUMIN MFR UR ELPH: 5.8 MG/DL
BACTERIA UR CULT: NORMAL
CREAT UR-MCNC: 61.7 MG/DL
HBV SURFACE AG SERPL QL IA: NONREACTIVE
HCV AB SERPL QL IA: NONREACTIVE
HIV 1+2 AB+HIV1 P24 AG SERPL QL IA: NONREACTIVE
PROT/CREAT 24H UR: 0.09 MG/MG CR (ref 0–0.2)
RUBV IGG SERPL QL IA: 1.87 INDEX
RUBV IGG SERPL QL IA: POSITIVE
T PALLIDUM AB SER QL: NONREACTIVE

## 2023-09-19 ENCOUNTER — LAB (OUTPATIENT)
Dept: LAB | Facility: OTHER | Age: 33
End: 2023-09-19
Attending: OBSTETRICS & GYNECOLOGY
Payer: COMMERCIAL

## 2023-09-19 ENCOUNTER — PRENATAL OFFICE VISIT (OUTPATIENT)
Dept: OBGYN | Facility: OTHER | Age: 33
End: 2023-09-19
Attending: OBSTETRICS & GYNECOLOGY
Payer: COMMERCIAL

## 2023-09-19 ENCOUNTER — TELEPHONE (OUTPATIENT)
Dept: OBGYN | Facility: OTHER | Age: 33
End: 2023-09-19

## 2023-09-19 VITALS
WEIGHT: 245.3 LBS | DIASTOLIC BLOOD PRESSURE: 102 MMHG | HEART RATE: 88 BPM | BODY MASS INDEX: 36.22 KG/M2 | SYSTOLIC BLOOD PRESSURE: 148 MMHG

## 2023-09-19 DIAGNOSIS — Z87.59 HISTORY OF MISCARRIAGE: Primary | ICD-10-CM

## 2023-09-19 DIAGNOSIS — Z34.81 ENCOUNTER FOR SUPERVISION OF OTHER NORMAL PREGNANCY IN FIRST TRIMESTER: ICD-10-CM

## 2023-09-19 LAB
C TRACH DNA SPEC QL PROBE+SIG AMP: NEGATIVE
N GONORRHOEA DNA SPEC QL NAA+PROBE: NEGATIVE

## 2023-09-19 PROCEDURE — 87591 N.GONORRHOEAE DNA AMP PROB: CPT | Mod: ZL | Performed by: OBSTETRICS & GYNECOLOGY

## 2023-09-19 PROCEDURE — 87491 CHLMYD TRACH DNA AMP PROBE: CPT | Mod: ZL | Performed by: OBSTETRICS & GYNECOLOGY

## 2023-09-19 PROCEDURE — 36415 COLL VENOUS BLD VENIPUNCTURE: CPT | Mod: ZL

## 2023-09-19 PROCEDURE — 99207 PR OB VISIT-NO CHARGE - GICH ONLY: CPT | Performed by: STUDENT IN AN ORGANIZED HEALTH CARE EDUCATION/TRAINING PROGRAM

## 2023-09-19 NOTE — PROGRESS NOTES
Return OB Visit    S: Noted a small amount of spotting, not associated with intercourse. She has history of recurrent miscarriages and is taking supplemental vaginal progesterone. She denies any painful cramping.    O:   Gen: Well-appearing, NAD     bpm, good movement noted on bedside US  Posterior placenta on bedside US: possible small subchorionic hemorrhage at fundal edge of placenta.    Pelvic: normal external genitalia, no vaginal discharge or sign of bleeding, cervix closed no sign of bleeding, no discharge, no CMT. Urethral normal size and no lesions or bleeding.     Assessment:  Ms. Kizzy Chan is a 32 year old yo  here for an OB follow up visit. This pregnancy is complicated by history of recurrent pregnancy loss, history of gHTN.    Plan:    # Vaginal spotting  -- Reassuring bedside US today: formal US ordered for next week and placental assessment  -- No sign of infection or vaginal cause today  -- Continue progesterone supplementation    Vilma Chao MD  OB/GYN  2023 1:28 PM

## 2023-09-19 NOTE — NURSING NOTE
Patient presents with spotting Sunday and again today.    Tomeka Crow RN...................9/19/2023 1:26 PM

## 2023-09-19 NOTE — TELEPHONE ENCOUNTER
Patient calls today with concern of spotting on Sunday and again today. It is light pink. She has no other symptoms. Denies recent intercourse or strenuous activity. Her blood type is O Pos. Patient is requesting appointment today. Per akhil Kennedy for patient to be seen. Patient aware.    Tomeka Crow RN...................9/19/2023 1:11 PM

## 2023-09-25 ENCOUNTER — HOSPITAL ENCOUNTER (OUTPATIENT)
Dept: ULTRASOUND IMAGING | Facility: OTHER | Age: 33
Discharge: HOME | End: 2023-09-25
Attending: STUDENT IN AN ORGANIZED HEALTH CARE EDUCATION/TRAINING PROGRAM | Admitting: STUDENT IN AN ORGANIZED HEALTH CARE EDUCATION/TRAINING PROGRAM
Payer: COMMERCIAL

## 2023-09-25 DIAGNOSIS — Z87.59 HISTORY OF MISCARRIAGE: ICD-10-CM

## 2023-09-25 PROCEDURE — 76801 OB US < 14 WKS SINGLE FETUS: CPT

## 2023-09-26 LAB — SCANNED LAB RESULT: NORMAL

## 2023-10-02 ASSESSMENT — PATIENT HEALTH QUESTIONNAIRE - PHQ9
SUM OF ALL RESPONSES TO PHQ QUESTIONS 1-9: 0
10. IF YOU CHECKED OFF ANY PROBLEMS, HOW DIFFICULT HAVE THESE PROBLEMS MADE IT FOR YOU TO DO YOUR WORK, TAKE CARE OF THINGS AT HOME, OR GET ALONG WITH OTHER PEOPLE: NOT DIFFICULT AT ALL
SUM OF ALL RESPONSES TO PHQ QUESTIONS 1-9: 0

## 2023-10-03 ENCOUNTER — PRENATAL OFFICE VISIT (OUTPATIENT)
Dept: OBGYN | Facility: OTHER | Age: 33
End: 2023-10-03
Attending: OBSTETRICS & GYNECOLOGY
Payer: COMMERCIAL

## 2023-10-03 VITALS
SYSTOLIC BLOOD PRESSURE: 128 MMHG | HEART RATE: 80 BPM | BODY MASS INDEX: 36.33 KG/M2 | DIASTOLIC BLOOD PRESSURE: 80 MMHG | WEIGHT: 246 LBS

## 2023-10-03 DIAGNOSIS — Z34.82 ENCOUNTER FOR SUPERVISION OF OTHER NORMAL PREGNANCY IN SECOND TRIMESTER: Primary | ICD-10-CM

## 2023-10-03 DIAGNOSIS — Z87.59 HISTORY OF MISCARRIAGE: ICD-10-CM

## 2023-10-03 PROCEDURE — 99207 PR OB VISIT-NO CHARGE - GICH ONLY: CPT | Performed by: OBSTETRICS & GYNECOLOGY

## 2023-10-03 ASSESSMENT — PAIN SCALES - GENERAL: PAINLEVEL: NO PAIN (0)

## 2023-10-03 NOTE — NURSING NOTE
Chief Complaint   Patient presents with    Prenatal Care     13w1d        Medication Reconciliation: complete  Other then some spotting offers no concerns     Sara Lewis LPN........................10/3/2023  8:52 AM

## 2023-10-03 NOTE — PROGRESS NOTES
Return OB Visit    S: Patient is feeling well. No cramping. Had one day of pink spotting last week. BPs at home have been 120-130/80s    O: /80 (BP Location: Right arm, Patient Position: Sitting, Cuff Size: Adult Large)   Pulse 80   Wt 111.6 kg (246 lb)   LMP 2023   BMI 36.33 kg/m    Gen: Well-appearing, NAD  See OB Flowsheet    Bedside US: single fetus, , posterior placenta previa with small clot at internal os.     A/P:  Kizzy Chan is a 32 year old  at 13w1d by 9w1d US, here for return OB visit.  Hx of recurrent pregnancy loss: using progesterone suppositories, doesn't feel ready to stop yet    Hx of Gestational HTN:   - baseline HELLP labs, UPC today.   - started ASA 81mg at 12 weeks    Subchorionic hemorrhage     PNC: Rh positive, RI  Genetics: normal aneuploidy screening  Imaging: dating US at 9w1d   Immunizations: s/p COVID series     Follow up in 2 weeks    Mayi Benavidez MD FACOG  OB/GYN  10/3/2023 9:01 AM

## 2023-10-16 ASSESSMENT — PATIENT HEALTH QUESTIONNAIRE - PHQ9
SUM OF ALL RESPONSES TO PHQ QUESTIONS 1-9: 1
10. IF YOU CHECKED OFF ANY PROBLEMS, HOW DIFFICULT HAVE THESE PROBLEMS MADE IT FOR YOU TO DO YOUR WORK, TAKE CARE OF THINGS AT HOME, OR GET ALONG WITH OTHER PEOPLE: NOT DIFFICULT AT ALL
SUM OF ALL RESPONSES TO PHQ QUESTIONS 1-9: 1

## 2023-10-17 ENCOUNTER — PRENATAL OFFICE VISIT (OUTPATIENT)
Dept: OBGYN | Facility: OTHER | Age: 33
End: 2023-10-17
Attending: OBSTETRICS & GYNECOLOGY
Payer: COMMERCIAL

## 2023-10-17 VITALS
HEART RATE: 88 BPM | WEIGHT: 249 LBS | DIASTOLIC BLOOD PRESSURE: 82 MMHG | BODY MASS INDEX: 36.77 KG/M2 | SYSTOLIC BLOOD PRESSURE: 136 MMHG

## 2023-10-17 DIAGNOSIS — Z34.82 ENCOUNTER FOR SUPERVISION OF OTHER NORMAL PREGNANCY IN SECOND TRIMESTER: Primary | ICD-10-CM

## 2023-10-17 PROCEDURE — 99207 PR OB VISIT-NO CHARGE - GICH ONLY: CPT | Performed by: OBSTETRICS & GYNECOLOGY

## 2023-10-17 ASSESSMENT — PAIN SCALES - GENERAL: PAINLEVEL: NO PAIN (0)

## 2023-10-17 NOTE — PROGRESS NOTES
Return OB Visit    S: Patient is feeling well. No cramping or VB.    O: /82 (BP Location: Right arm, Patient Position: Sitting, Cuff Size: Adult Large)   Pulse 88   Wt 112.9 kg (249 lb)   LMP 2023   BMI 36.77 kg/m    Gen: Well-appearing, NAD  See OB Flowsheet    A/P:  Kizzy Chan is a 33 year old  at 15w1d by 9w1d US, here for return OB visit.  Hx of recurrent pregnancy loss: using progesterone suppositories, discussed stopping, which patient is now agreeable to     Hx of Gestational HTN:   - baseline HELLP labs, UPC today.   - started ASA 81mg at 12 weeks     Subchorionic hemorrhage     PNC: Rh positive, RI  Genetics: normal aneuploidy screening  Imaging: dating US at 9w1d   Immunizations: s/p COVID series     Follow up in 4 weeks    Mayi Benavidez MD FACOG  OB/GYN  10/17/2023 10:17 AM

## 2023-10-17 NOTE — NURSING NOTE
-Discussed symptoms and medication for treatment.  -May return to school when fever free for 24 hours.   -Administer antibiotic as prescribed.  -Give tylenol or motrin as needed for fever or discomfort.  -Follow up in 2 weeks.  -Notify clinic of any new concerns.    - Discussed upper respiratory infection diagnosis with patient and/or caregiver.  - Discussed course of illness  - Discussed use of children's tylenol or motrin as needed for fever and discomfort.  - Symptomatic management such as rest and increased fluid intake advised; may use cool-mist humidifier, vapo-rub on chest, and nasal saline drops to aid with congestion.   - Return to clinic if condition persist or worsens.  - Call [facility] as needed for any questions or concerns.    
Chief Complaint   Patient presents with    Prenatal Care     15w1d       Medication Reconciliation: complete  Offers no concerns and states doing well.       Sara Lewis LPN........................10/17/2023  9:46 AM  
Denies

## 2023-11-14 ENCOUNTER — PRENATAL OFFICE VISIT (OUTPATIENT)
Dept: OBGYN | Facility: OTHER | Age: 33
End: 2023-11-14
Attending: OBSTETRICS & GYNECOLOGY
Payer: COMMERCIAL

## 2023-11-14 ENCOUNTER — HOSPITAL ENCOUNTER (OUTPATIENT)
Dept: ULTRASOUND IMAGING | Facility: OTHER | Age: 33
Discharge: HOME | End: 2023-11-14
Attending: OBSTETRICS & GYNECOLOGY
Payer: COMMERCIAL

## 2023-11-14 VITALS
WEIGHT: 245 LBS | BODY MASS INDEX: 36.18 KG/M2 | DIASTOLIC BLOOD PRESSURE: 74 MMHG | SYSTOLIC BLOOD PRESSURE: 136 MMHG | HEART RATE: 88 BPM

## 2023-11-14 DIAGNOSIS — Z34.82 ENCOUNTER FOR SUPERVISION OF OTHER NORMAL PREGNANCY IN SECOND TRIMESTER: ICD-10-CM

## 2023-11-14 DIAGNOSIS — Z34.82 ENCOUNTER FOR SUPERVISION OF OTHER NORMAL PREGNANCY IN SECOND TRIMESTER: Primary | ICD-10-CM

## 2023-11-14 PROCEDURE — 76805 OB US >/= 14 WKS SNGL FETUS: CPT

## 2023-11-14 PROCEDURE — 99207 PR OB VISIT-NO CHARGE - GICH ONLY: CPT | Performed by: OBSTETRICS & GYNECOLOGY

## 2023-11-14 RX ORDER — ASPIRIN 81 MG/1
81 TABLET, CHEWABLE ORAL DAILY
COMMUNITY
End: 2024-05-23

## 2023-11-14 ASSESSMENT — PAIN SCALES - GENERAL: PAINLEVEL: NO PAIN (0)

## 2023-11-14 NOTE — NURSING NOTE
Chief Complaint   Patient presents with    Prenatal Care     19w1d       Medication Reconciliation: complete  Pt presents to clinic today for prenatal care. Pt denies any bleeding, or leakage of fluid at this time.     Sara Lewis LPN........................11/14/2023  9:38 AM

## 2023-11-14 NOTE — PROGRESS NOTES
Return OB Visit    S: Patient is feeling well. Occasional rust-colored discharge but no bright red bleeding. No cramping.    O: /74 (BP Location: Right arm, Patient Position: Sitting, Cuff Size: Adult Large)   Pulse 88   Wt 111.1 kg (245 lb)   LMP 2023   BMI 36.18 kg/m    Gen: Well-appearing, NAD  See OB Flowsheet    A/P:  Kizzy Chan is a 33 year old  at 19w1d by 9w1d US, here for return OB visit.  Hx of recurrent pregnancy loss: s/p progesterone suppositories     Hx of Gestational HTN vs chronic HTN:   - baseline HELLP labs, UPC normal.   - started ASA 81mg at 12 weeks     Subchorionic hemorrhage    Low lying placenta: plan repeat at 32 weeks     PNC: Rh positive, RI  Genetics: normal aneuploidy screening  Imaging: dating US at 9w1d, anatomy survey pending from today  Immunizations: s/p COVID series, s/p flu shot at work  RTC 4 weeks    Mayi Benavidez MD FACOG  OB/GYN  2023 9:51 AM

## 2023-12-19 ENCOUNTER — PRENATAL OFFICE VISIT (OUTPATIENT)
Dept: OBGYN | Facility: OTHER | Age: 33
End: 2023-12-19
Attending: FAMILY MEDICINE
Payer: COMMERCIAL

## 2023-12-19 VITALS
DIASTOLIC BLOOD PRESSURE: 82 MMHG | BODY MASS INDEX: 36.62 KG/M2 | WEIGHT: 248 LBS | SYSTOLIC BLOOD PRESSURE: 134 MMHG | HEART RATE: 88 BPM

## 2023-12-19 DIAGNOSIS — Z34.82 ENCOUNTER FOR SUPERVISION OF OTHER NORMAL PREGNANCY IN SECOND TRIMESTER: Primary | ICD-10-CM

## 2023-12-19 LAB
ERYTHROCYTE [DISTWIDTH] IN BLOOD BY AUTOMATED COUNT: 12.9 % (ref 10–15)
GLUCOSE 1H P 50 G GLC PO SERPL-MCNC: 103 MG/DL (ref 70–129)
HCT VFR BLD AUTO: 34.5 % (ref 35–47)
HGB BLD-MCNC: 11.8 G/DL (ref 11.7–15.7)
MCH RBC QN AUTO: 30.3 PG (ref 26.5–33)
MCHC RBC AUTO-ENTMCNC: 34.2 G/DL (ref 31.5–36.5)
MCV RBC AUTO: 89 FL (ref 78–100)
PLATELET # BLD AUTO: 224 10E3/UL (ref 150–450)
RBC # BLD AUTO: 3.89 10E6/UL (ref 3.8–5.2)
T PALLIDUM AB SER QL: NONREACTIVE
WBC # BLD AUTO: 10.4 10E3/UL (ref 4–11)

## 2023-12-19 PROCEDURE — 36415 COLL VENOUS BLD VENIPUNCTURE: CPT | Mod: ZL | Performed by: OBSTETRICS & GYNECOLOGY

## 2023-12-19 PROCEDURE — 82950 GLUCOSE TEST: CPT | Mod: ZL | Performed by: OBSTETRICS & GYNECOLOGY

## 2023-12-19 PROCEDURE — 99207 PR OB VISIT-NO CHARGE - GICH ONLY: CPT | Performed by: OBSTETRICS & GYNECOLOGY

## 2023-12-19 PROCEDURE — 86780 TREPONEMA PALLIDUM: CPT | Mod: ZL | Performed by: OBSTETRICS & GYNECOLOGY

## 2023-12-19 PROCEDURE — 85027 COMPLETE CBC AUTOMATED: CPT | Mod: ZL | Performed by: OBSTETRICS & GYNECOLOGY

## 2023-12-19 ASSESSMENT — PATIENT HEALTH QUESTIONNAIRE - PHQ9
SUM OF ALL RESPONSES TO PHQ QUESTIONS 1-9: 0
SUM OF ALL RESPONSES TO PHQ QUESTIONS 1-9: 0
10. IF YOU CHECKED OFF ANY PROBLEMS, HOW DIFFICULT HAVE THESE PROBLEMS MADE IT FOR YOU TO DO YOUR WORK, TAKE CARE OF THINGS AT HOME, OR GET ALONG WITH OTHER PEOPLE: NOT DIFFICULT AT ALL

## 2023-12-19 ASSESSMENT — PAIN SCALES - GENERAL: PAINLEVEL: NO PAIN (0)

## 2023-12-19 NOTE — PROGRESS NOTES
Return OB Visit    S: Patient is feeling well. No cramping or VB. +FM    O: /82 (BP Location: Right arm, Patient Position: Sitting, Cuff Size: Adult Large)   Pulse 88   Wt 112.5 kg (248 lb)   LMP 2023   BMI 36.62 kg/m    Gen: Well-appearing, NAD  See OB Flowsheet    A/P:  Kizzy Chan is a 33 year old  at 24w1d by 9w1d US, here for return OB visit.  Hx of recurrent pregnancy loss: s/p progesterone suppositories     Hx of Gestational HTN vs chronic HTN:   - baseline HELLP labs, UPC normal.   - started ASA 81mg at 12 weeks     Subchorionic hemorrhage     Low lying placenta: plan repeat at 32 weeks     PNC: Rh positive, RI  Genetics: normal aneuploidy screening  Imaging: dating US at 9w1d, anatomy survey normal  Immunizations: s/p COVID series, s/p flu shot at work  RTC 4 weeks    Mayi Benavidez MD FACOG  OB/GYN  2023 9:15 AM

## 2023-12-19 NOTE — NURSING NOTE
Chief Complaint   Patient presents with    Prenatal Care     24w1D       Medication Reconciliation: complete  Pt presents to clinic today for prenatal care . Pt denies any bleeding, or leakage of fluid at this time. States baby is moving good. Patient denies contractions.       Sara Lewis LPN........................12/19/2023  9:06 AM

## 2024-01-02 NOTE — PATIENT INSTRUCTIONS
You have been provided the Any Day Now: Early Labor at Home document.    Additional copies can be found here:  [email or web address]  You have been provided the What I'd Wish I'd Known About Giving Birth document.    Additional copies can be found here:  [email or web address]

## 2024-01-16 ENCOUNTER — PRENATAL OFFICE VISIT (OUTPATIENT)
Dept: OBGYN | Facility: OTHER | Age: 34
End: 2024-01-16
Attending: OBSTETRICS & GYNECOLOGY
Payer: COMMERCIAL

## 2024-01-16 VITALS
WEIGHT: 248 LBS | DIASTOLIC BLOOD PRESSURE: 82 MMHG | BODY MASS INDEX: 36.62 KG/M2 | HEART RATE: 96 BPM | SYSTOLIC BLOOD PRESSURE: 128 MMHG

## 2024-01-16 DIAGNOSIS — Z34.82 ENCOUNTER FOR SUPERVISION OF OTHER NORMAL PREGNANCY IN SECOND TRIMESTER: Primary | ICD-10-CM

## 2024-01-16 PROCEDURE — 90471 IMMUNIZATION ADMIN: CPT | Performed by: OBSTETRICS & GYNECOLOGY

## 2024-01-16 PROCEDURE — 90715 TDAP VACCINE 7 YRS/> IM: CPT | Performed by: OBSTETRICS & GYNECOLOGY

## 2024-01-16 PROCEDURE — 99207 PR OB VISIT-NO CHARGE - GICH ONLY: CPT | Performed by: OBSTETRICS & GYNECOLOGY

## 2024-01-16 RX ORDER — FAMOTIDINE 20 MG/1
20 TABLET, FILM COATED ORAL 2 TIMES DAILY PRN
COMMUNITY
Start: 2024-01-13

## 2024-01-16 ASSESSMENT — PAIN SCALES - GENERAL: PAINLEVEL: NO PAIN (0)

## 2024-01-16 NOTE — NURSING NOTE
Chief Complaint   Patient presents with    Prenatal Care     28w1d       Medication Reconciliation: complete  Pt presents to clinic today for prenatal care . Pt denies any bleeding, or leakage of fluid at this time. States baby is moving good. Patient any regular or painful contractions.       Sara Lewis LPN........................1/16/2024  9:27 AM

## 2024-01-25 ASSESSMENT — PATIENT HEALTH QUESTIONNAIRE - PHQ9
SUM OF ALL RESPONSES TO PHQ QUESTIONS 1-9: 3
10. IF YOU CHECKED OFF ANY PROBLEMS, HOW DIFFICULT HAVE THESE PROBLEMS MADE IT FOR YOU TO DO YOUR WORK, TAKE CARE OF THINGS AT HOME, OR GET ALONG WITH OTHER PEOPLE: NOT DIFFICULT AT ALL
SUM OF ALL RESPONSES TO PHQ QUESTIONS 1-9: 3

## 2024-01-26 ENCOUNTER — OFFICE VISIT (OUTPATIENT)
Dept: FAMILY MEDICINE | Facility: OTHER | Age: 34
End: 2024-01-26
Attending: FAMILY MEDICINE
Payer: COMMERCIAL

## 2024-01-26 VITALS
HEART RATE: 105 BPM | RESPIRATION RATE: 20 BRPM | TEMPERATURE: 97.5 F | BODY MASS INDEX: 37.36 KG/M2 | OXYGEN SATURATION: 98 % | WEIGHT: 253 LBS | DIASTOLIC BLOOD PRESSURE: 74 MMHG | SYSTOLIC BLOOD PRESSURE: 126 MMHG

## 2024-01-26 DIAGNOSIS — L70.9 ACNE, UNSPECIFIED ACNE TYPE: Primary | ICD-10-CM

## 2024-01-26 DIAGNOSIS — M54.50 LOW BACK PAIN, UNSPECIFIED BACK PAIN LATERALITY, UNSPECIFIED CHRONICITY, UNSPECIFIED WHETHER SCIATICA PRESENT: ICD-10-CM

## 2024-01-26 LAB
HGB BLD-MCNC: 11.8 G/DL (ref 11.7–15.7)
IRON SERPL-MCNC: 80 UG/DL (ref 37–145)

## 2024-01-26 PROCEDURE — 99213 OFFICE O/P EST LOW 20 MIN: CPT | Performed by: FAMILY MEDICINE

## 2024-01-26 PROCEDURE — 85018 HEMOGLOBIN: CPT | Mod: ZL | Performed by: FAMILY MEDICINE

## 2024-01-26 PROCEDURE — 36415 COLL VENOUS BLD VENIPUNCTURE: CPT | Mod: ZL | Performed by: FAMILY MEDICINE

## 2024-01-26 PROCEDURE — 83540 ASSAY OF IRON: CPT | Mod: ZL | Performed by: FAMILY MEDICINE

## 2024-01-26 RX ORDER — AZELAIC ACID 0.15 G/G
GEL TOPICAL
Qty: 50 G | Refills: 11 | Status: SHIPPED | OUTPATIENT
Start: 2024-01-26

## 2024-01-26 ASSESSMENT — PAIN SCALES - GENERAL: PAINLEVEL: NO PAIN (0)

## 2024-01-26 NOTE — NURSING NOTE
"Chief Complaint   Patient presents with    Derm Problem       Initial /74   Pulse 105   Temp 97.5  F (36.4  C) (Tympanic)   Resp 20   Wt 114.8 kg (253 lb)   LMP 07/06/2023   SpO2 98%   BMI 37.36 kg/m   Estimated body mass index is 37.36 kg/m  as calculated from the following:    Height as of 9/5/23: 1.753 m (5' 9\").    Weight as of this encounter: 114.8 kg (253 lb).  Medication Review: complete    The next two questions are to help us understand your food security.  If you are feeling you need any assistance in this area, we have resources available to support you today.          1/19/2024   SDOH- Food Insecurity   Within the past 12 months, did you worry that your food would run out before you got money to buy more? N   Within the past 12 months, did the food you bought just not last and you didn t have money to get more? N         Health Care Directive:  Patient does not have a Health Care Directive or Living Will: Discussed advance care planning with patient; information given to patient to review.    Sanam Renee LPN      "

## 2024-01-26 NOTE — LETTER
My Depression Action Plan  Name: Kizzy Chan   Date of Birth 1990  Date: 1/26/2024    My doctor: Lenora Osborne   My clinic: [facility]  [address]  [place] [address]  [phone]            GREEN    ZONE   Good Control    What it looks like:   Things are going generally well. You have normal ups and downs. You may even feel depressed from time to time, but bad moods usually last less than a day.   What you need to do:  Continue to care for yourself (see self care plan)  Check your depression survival kit and update it as needed  Follow your physician s recommendations including any medication.  Do not stop taking medication unless you consult with your physician first.             YELLOW         ZONE Getting Worse    What it looks like:   Depression is starting to interfere with your life.   It may be hard to get out of bed; you may be starting to isolate yourself from others.  Symptoms of depression are starting to last most all day and this has happened for several days.   You may have suicidal thoughts but they are not constant.   What you need to do:     Call your care team. Your response to treatment will improve if you keep your care team informed of your progress. Yellow periods are signs an adjustment may need to be made.     Continue your self-care.  Just get dressed and ready for the day.  Don't give yourself time to talk yourself out of it.    Talk to someone in your support network.    Open up your Depression Self-Care Plan/Wellness Kit.             RED    ZONE Medical Alert - Get Help    What it looks like:   Depression is seriously interfering with your life.   You may experience these or other symptoms: You can t get out of bed most days, can t work or engage in other necessary activities, you have trouble taking care of basic hygiene, or basic responsibilities, thoughts of suicide or death that will not go away,  self-injurious behavior.     What you need to do:  Call your care team and request a same-day appointment. If they are not available (weekends or after hours) call your local crisis line, emergency room or 911.          Depression Self-Care Plan / Wellness Kit    Many people find that medication and therapy are helpful treatments for managing depression. In addition, making small changes to your everyday life can help to boost your mood and improve your wellbeing. Below are some tips for you to consider. Be sure to talk with your medical provider and/or behavioral health consultant if your symptoms are worsening or not improving.     Sleep   Sleep hygiene  means all of the habits that support good, restful sleep. It includes maintaining a consistent bedtime and wake time, using your bedroom only for sleeping or sex, and keeping the bedroom dark and free of distractions like a computer, smartphone, or television.     Develop a Healthy Routine  Maintain good hygiene. Get out of bed in the morning, make your bed, brush your teeth, take a shower, and get dressed. Don t spend too much time viewing media that makes you feel stressed. Find time to relax each day.    Exercise  Get some form of exercise every day. This will help reduce pain and release endorphins, the  feel good  chemicals in your brain. It can be as simple as just going for a walk or doing some gardening, anything that will get you moving.      Diet  Strive to eat healthy foods, including fruits and vegetables. Drink plenty of water. Avoid excessive sugar, caffeine, alcohol, and other mood-altering substances.     Stay Connected with Others  Stay in touch with friends and family members.    Manage Your Mood  Try deep breathing, massage therapy, biofeedback, or meditation. Take part in fun activities when you can. Try to find something to smile about each day.     Psychotherapy  Be open to working with a therapist if your provider recommends it.      Medication  Be sure to take your medication as prescribed. Most anti-depressants need to be taken every day. It usually takes several weeks for medications to work. Not all medicines work for all people. It is important to follow-up with your provider to make sure you have a treatment plan that is working for you. Do not stop your medication abruptly without first discussing it with your provider.    Crisis Resources   These hotlines are for both adults and children. They and are open 24 hours a day, 7 days a week unless noted otherwise.    National Suicide Prevention Lifeline   988 or 6-168-955-EUSM (3509)    Crisis Text Line    www.crisistextline.org  Text HOME to 224803 from anywhere in the [place], anytime, about any type of crisis. A live, trained crisis counselor will receive the text and respond quickly.    [employer] Lifeline for LGBTQ Youth  A national crisis intervention and suicide lifeline for LGBTQ youth under 25. Provides a safe place to talk without judgement. Call 1-598.633.8402; text START to 471170 or visit www.thetrevorproject.org to talk to a trained counselor.    For [facility] crisis numbers, visit the [facility] website at:  https://[place].gov/dhs/people-we-serve/adults/health-care/mental-health/resources/crisis-contacts.jsp

## 2024-01-26 NOTE — PROGRESS NOTES
"Nursing Notes:   Sanam Renee LPN  1/26/2024  1:14 PM  Signed  Chief Complaint   Patient presents with    Derm Problem       Initial /74   Pulse 105   Temp 97.5  F (36.4  C) (Tympanic)   Resp 20   Wt 114.8 kg (253 lb)   LMP 07/06/2023   SpO2 98%   BMI 37.36 kg/m   Estimated body mass index is 37.36 kg/m  as calculated from the following:    Height as of 9/5/23: 1.753 m (5' 9\").    Weight as of this encounter: 114.8 kg (253 lb).  Medication Review: complete    The next two questions are to help us understand your food security.  If you are feeling you need any assistance in this area, we have resources available to support you today.          1/19/2024   SDOH- Food Insecurity   Within the past 12 months, did you worry that your food would run out before you got money to buy more? N   Within the past 12 months, did the food you bought just not last and you didn t have money to get more? N         Health Care Directive:  Patient does not have a Health Care Directive or Living Will: Discussed advance care planning with patient; information given to patient to review.    Sanam Renee LPN          Palak Durand is a 33 year old, presenting for the following health issues:  Derm Problem        1/26/2024     1:09 PM   Additional Questions   Roomed by JEROME Marion   Accompanied by self     Kizzy is here today for a complaint of acne.  She is currently 29 weeks pregnant and has noticed worsened acne symptoms since pregnant.  She had seen Dermatology recently.  Was prescribed azeleic acid, but very expensive.  She was told by the pharmacist that if a [facility] provider prescribed it for her instead, it would be more reasonably priced and is requesting that today.      She also has been having issues with SI/low back pain with this pregnancy.  She would like a referral to [facility] Chiropractor Dr. Rocky Rodriguez.    History of Present Illness       Reason for visit:  Rx for azelaic acid    She eats 2-3 servings of " fruits and vegetables daily.She consumes 0 sweetened beverage(s) daily.She exercises with enough effort to increase her heart rate 10 to 19 minutes per day.  She exercises with enough effort to increase her heart rate 3 or less days per week.   She is taking medications regularly.                 Review of Systems  Constitutional, HEENT, cardiovascular, pulmonary, GI, , musculoskeletal, neuro, skin, endocrine and psych systems are negative, except as otherwise noted.      Objective    /74   Pulse 105   Temp 97.5  F (36.4  C) (Tympanic)   Resp 20   Wt 114.8 kg (253 lb)   LMP 07/06/2023   SpO2 98%   BMI 37.36 kg/m    Body mass index is 37.36 kg/m .  Physical Exam  Constitutional:       Appearance: Normal appearance.   HENT:      Head: Normocephalic.   Eyes:      Extraocular Movements: Extraocular movements intact.      Pupils: Pupils are equal, round, and reactive to light.   Cardiovascular:      Rate and Rhythm: Normal rate and regular rhythm.      Heart sounds: Normal heart sounds. No murmur heard.  Pulmonary:      Breath sounds: No wheezing, rhonchi or rales.   Musculoskeletal:      Right lower leg: No edema.      Left lower leg: No edema.      Comments: Discomfort in her SI region bilaterally.   Skin:     Comments: Small acneiform lesions on her jaw line and neck.   Neurological:      Mental Status: She is alert.      Assessment & Plan     ICD-10-CM    1. Acne, unspecified acne type  L70.9 azelaic acid (FINACIA) 15 % external gel      2. Low back pain, unspecified back pain laterality, unspecified chronicity, unspecified whether sciatica present  M54.50 Chiropractic Referral           Prescription for azelaic acid sent to pharmacy.    Referred to Chiropractor.    No follow-ups on file.    Lenora Osborne MD  [facility]

## 2024-01-30 ENCOUNTER — THERAPY VISIT (OUTPATIENT)
Dept: CHIROPRACTIC MEDICINE | Facility: OTHER | Age: 34
End: 2024-01-30
Attending: CHIROPRACTOR
Payer: COMMERCIAL

## 2024-01-30 VITALS
HEART RATE: 111 BPM | TEMPERATURE: 97.5 F | SYSTOLIC BLOOD PRESSURE: 124 MMHG | DIASTOLIC BLOOD PRESSURE: 84 MMHG | RESPIRATION RATE: 16 BRPM | OXYGEN SATURATION: 99 %

## 2024-01-30 DIAGNOSIS — M54.50 LOW BACK PAIN, UNSPECIFIED BACK PAIN LATERALITY, UNSPECIFIED CHRONICITY, UNSPECIFIED WHETHER SCIATICA PRESENT: ICD-10-CM

## 2024-01-30 DIAGNOSIS — M54.50 BILATERAL LOW BACK PAIN WITHOUT SCIATICA, UNSPECIFIED CHRONICITY: ICD-10-CM

## 2024-01-30 DIAGNOSIS — M99.04 SEGMENTAL AND SOMATIC DYSFUNCTION OF SACRAL REGION: Primary | ICD-10-CM

## 2024-01-30 PROCEDURE — 98940 CHIROPRACT MANJ 1-2 REGIONS: CPT | Mod: AT | Performed by: CHIROPRACTOR

## 2024-01-30 PROCEDURE — 99212 OFFICE O/P EST SF 10 MIN: CPT | Mod: 25 | Performed by: CHIROPRACTOR

## 2024-01-30 NOTE — PROGRESS NOTES
Started around two weeks ago. Bilateral lower back with intermittent   dullness and aching. 0/10 W24 4/10.   Latoya Catalan on 1/30/2024 at 8:05 AM    Reviewed by CC.    PATIENT:  Kizzy Chan is a 33 year old female presenting for bilateral lower back pain that has been going on for about 2 weeks.  Pain is described as intermittent dullness and aching.  She states that it is worse by the end of the day and typically worse yet when sitting.  Patient states that on days that she works her symptoms are more prevalent by the end of the day.  Patient is currently 30 weeks gravid.  She reports no back pain with first 2 pregnancies.  Kizzy reports that she did have round ligament pain in the beginning of her pregnancy but is not currently.  Patient denies any injury or radicular pain.  Patient reports no effect on sleep, stating that she sleeps on her side.  When she wakes the next morning her back pain is gone again.  Currently patient rates her pain very low but states that within the last 24 hours her pain was 4/10.    PROBLEM:   Date of Initial Visit for this Episode:  1/30/2024    Visit #1    (DVPRS) Pain Rating Score : 0-No pain (W24 4/10) (01/30/24 0800)    Oswestry (FISH) Questionnaire        1/30/2024     8:07 AM   OSWESTRY DISABILITY INDEX   Count 9   Sum 13   Oswestry Score (%) 28.89 %      KeeleSTART Back Sub score 0 Total score 3       PAST MEDICAL HISTORY:  Past Medical History:   Diagnosis Date    Anxiety     Asthma     exercise induced as child    Carrier of group B Streptococcus     Complication of anesthesia     Family history of malignant neoplasm of breast     mom and grandma; q6 month alternating mammo and MRI after done nursing    Hypertension     Gestational HTN       PAST SURGICAL HISTORY:  Past Surgical History:   Procedure Laterality Date    CLOSED REDUCTION DISTAL RADIUS FRACTURE  03/26/2005    COLONOSCOPY  01/09/2017       ALLERGIES:  Allergies   Allergen Reactions    Ceftriaxone Rash      Had a rash possibly from this injection 9-09    Nickel Rash    Penicillins Rash       CURRENT MEDICATIONS:  Current Outpatient Medications   Medication Sig Dispense Refill    aspirin (ASA) 81 MG chewable tablet Take 81 mg by mouth daily      azelaic acid (FINACIA) 15 % external gel Apply topically dailiy. 50 g 11    famotidine (PEPCID) 20 MG tablet Take 20 mg by mouth 2 times daily as needed      FLUoxetine (PROZAC) 20 MG capsule TAKE 1 CAPSULE BY MOUTH DAILY 90 capsule 1    Prenatal Vit-Fe Fumarate-FA (PRENATAL MULTIVITAMIN W/IRON) 27-0.8 MG tablet Take 1 tablet by mouth daily      Vitamin D3 (CHOLECALCIFEROL) 25 mcg (1000 units) tablet          SOCIAL HISTORY:  Social History     Socioeconomic History    Marital status:    Tobacco Use    Smoking status: Never    Smokeless tobacco: Never   Vaping Use    Vaping Use: Never used   Substance and Sexual Activity    Alcohol use: Not Currently     Alcohol/week: 2.0 standard drinks of alcohol     Types: 2 Glasses of wine per week     Comment: 2 glass wine a week    Drug use: Never    Sexual activity: Yes     Partners: Male     Birth control/protection: I.U.D., None   Social History Narrative    Mac - 2/2020    Coreen - 5/2021    Lc -  - works for [employer].     Social Determinants of Health     Financial Resource Strain: Low Risk  (1/19/2024)    Financial Resource Strain     Within the past 12 months, have you or your family members you live with been unable to get utilities (heat, electricity) when it was really needed?: No   Food Insecurity: Low Risk  (1/19/2024)    Food Insecurity     Within the past 12 months, did you worry that your food would run out before you got money to buy more?: No     Within the past 12 months, did the food you bought just not last and you didn t have money to get more?: No   Transportation Needs: Low Risk  (1/19/2024)    Transportation Needs     Within the past 12 months, has lack of transportation kept you  from medical appointments, getting your medicines, non-medical meetings or appointments, work, or from getting things that you need?: No   Interpersonal Safety: Low Risk  (1/26/2024)    Interpersonal Safety     Do you feel physically and emotionally safe where you currently live?: Yes     Within the past 12 months, have you been hit, slapped, kicked or otherwise physically hurt by someone?: No     Within the past 12 months, have you been humiliated or emotionally abused in other ways by your partner or ex-partner?: No   Housing Stability: Low Risk  (1/19/2024)    Housing Stability     Do you have housing? : Yes     Are you worried about losing your housing?: No        FAMILY HISTORY:  Family History   Problem Relation Age of Onset    Other - See Comments Mother         Ophthalmic Disease    Breast Cancer Mother 54    Hypertension Mother     Hyperlipidemia Father     No Known Problems Sister     Anxiety Disorder Brother     No Known Problems Brother     Other - See Comments Maternal Grandmother         Ophthalmic Disease    Breast Cancer Maternal Grandmother         77    Other - See Comments Maternal Grandfather         Ophthalmic Disease    Hyperlipidemia Maternal Grandfather     Hypertension Maternal Grandfather     GI problems Maternal Grandfather     Heart Disease Maternal Grandfather     Parkinsonism Paternal Grandmother     Cancer Paternal Grandfather     Cerebrovascular Disease Paternal Grandfather     No Known Problems Daughter     No Known Problems Son     Celiac Disease Paternal Aunt     Celiac Disease Cousin         paternal       Patient Active Problem List   Diagnosis    Generalized anxiety disorder    Irritable bowel syndrome with diarrhea    Fear of flying    Disorder of refraction and accommodation    Myopia    Other acne    White coat syndrome without hypertension    History of miscarriage    Gestational (pregnancy-induced) hypertension without significant proteinuria, complicating childbirth     Anxiety state    Failed moderate sedation during procedure    History of gestational hypertension    Recurrent pregnancy loss    Family history of breast cancer in mother         ROS:  The patient denies any fevers, chills, nausea, vomiting, diarrhea, constipation,dysuria, hematuria, or urinary hesitancy or incontinence.  No shortness of breath, chest pain, or rashes.    OBJECTIVE:    DIAGNOSTICS: No current spinal imaging taken.     PHYSICAL EXAM:   /84 (BP Location: Right arm, Patient Position: Sitting, Cuff Size: Adult Large)   Pulse 111   Temp 97.5  F (36.4  C) (Tympanic)   Resp 16   LMP 07/06/2023   SpO2 99%    GENERAL APPEARANCE: healthy, alert, and no distress   GAIT: NORMAL  SKIN: no suspicious lesions or rashes  NEURO: Gross motor function intact normal gait, symmetric and equal lower extremity DTRs, normal strength and light touch sensory  PSYCH:  mentation appears normal and affect normal/bright    MUSCULOSKELETAL:   Posture: Slight increase in lumbar lordosis as anticipated from pregnancy, slight right SI joint elevation on prone examination.  Gait:  unremarkable.     Active lumbar range of motion within normal limits and without pain at end range flexion and extension currently.    -Kemps  Seated slump test negative bilaterally  Nachlas negative for pain, slight restriction on right   Yeomans negative bilaterally      -Tenderness currently.  +Muscle mild hypertonicity in the right gluteal lateral to the right SI joint, mild hypertonicity in the lumbar paravertebrals as anticipated due to pregnancy.  +Joint asymmetry and restriction: Right lumbosacral restriction over left on PA advancement    ASSESSMENT: Kizzy Chan is a 33 year old female experiencing bilateral lower back pain without sciatic symptoms.  Currently symptoms are very mild as it is the beginning of the day.  She anticipates to have increased pain by the end of the day, however she does not work today and pain is  typically worse after working.  Segmental somatic dysfunction is present at the lumbosacral region, right over left.  Working diagnosis of facet irritation that is worse by the end of the day due to pregnant associated posture leading to facet irritation by the end of the day.  Patient is currently 30 weeks gravid, it is possible that back pain ebbs and flows but generally increases as pregnancy progresses.  This is her third pregnancy but her first pregnancy associated lower back pain episode, which is a good prognosis.  Trial of care consisting of sacral manipulation to alleviate lumbosacral facet joint pressure would be warranted at this time.  No contraindications to care.  Plan to follow-up next week unless symptoms continue significantly this week.     1. Segmental and somatic dysfunction of sacral region    2. Bilateral low back pain without sciatica, unspecified chronicity        PLAN    Evaluation and Management:  75007 Low to Moderate exam established patient 10 min  Total time spent on the date of this encounter, including chart review, history/exam and documentation: 20 mins      Procedures:  Modalities:  None performed this visit    CMT:  49865 Chiropractic manipulative treatment 1-2 regions performed   Pelvis: Right-side-down with good cavitation, sacrum did not move as well left side down, prone drop was utilized.    Therapeutic procedures:  None    Response to Treatment  Patient tolerated treatment well.  No change was noted in symptoms as patient was not experiencing pain prior to treatment.  Patient is advised to monitor symptoms tonight as well as the rest of this week after work to determine if progress or improvement was made.    Prognosis: Good    1/30/2024 Plan of Care: To initially consist of 2-6 visits of chiropractic care including spinal manipulative therapy to the lumbosacral region.  Treatment frequency will be progression based.  Treatment frequency has potential to increase as  pregnancy progresses as well.    POC discussed and patient agreeable to plan of care.      1/30/2024 Goals:   Goals include decreasing patient's pain intensity and frequency by the end of the day, decreasing Oswestry score is 30% in 4 weeks, using symptoms as pregnancy progresses.      INSTRUCTIONS   Patient advised to utilize ice if needed and was advised to perform extension stretches if it does not increase pain at the end of the night.    Follow-up:  Recommended follow-up in 1 week however monitor symptoms this week and return if symptoms do not improve at all.

## 2024-02-06 ENCOUNTER — THERAPY VISIT (OUTPATIENT)
Dept: CHIROPRACTIC MEDICINE | Facility: OTHER | Age: 34
End: 2024-02-06
Attending: ORTHOPAEDIC SURGERY
Payer: COMMERCIAL

## 2024-02-06 VITALS — TEMPERATURE: 97.7 F | RESPIRATION RATE: 16 BRPM | OXYGEN SATURATION: 99 % | HEART RATE: 91 BPM

## 2024-02-06 DIAGNOSIS — M99.04 SEGMENTAL AND SOMATIC DYSFUNCTION OF SACRAL REGION: Primary | ICD-10-CM

## 2024-02-06 DIAGNOSIS — M54.50 BILATERAL LOW BACK PAIN WITHOUT SCIATICA, UNSPECIFIED CHRONICITY: ICD-10-CM

## 2024-02-06 PROCEDURE — 98940 CHIROPRACT MANJ 1-2 REGIONS: CPT | Mod: AT | Performed by: CHIROPRACTOR

## 2024-02-06 NOTE — PROGRESS NOTES
Bilateral lower back with intermittent dull and aching. 2/10 W24 5/10.   Latoya Catalan on 2/6/2024 at 9:57 AM    Reviewed by CC.    Visit #:  2    Subjective:  Kizzy Chan is a 33 year old female who is seen in f/u up for:        Segmental and somatic dysfunction of sacral region  Bilateral low back pain without sciatica, unspecified chronicity.     Since last visit on 1/30/2024,  Kizzy Chan reports: That she felt really good after last visit for the remainder that day.  The next day low back still felt good however by the end of the day after working all day she did experience an increase in discomfort again.  Patient reports that overall it is some better compared to last week but pain still felt at the end of the working day.  Continues to report that after sitting for period of time and then going to stand up she will feel an increase in pain.    (DVPRS) Pain Rating Score : 2-Notice pain, does not interfere with activities (W24 5/10) (02/06/24 0955)     Objective:  The following was observed:  Pulse 91   Temp 97.7  F (36.5  C) (Tympanic)   Resp 16   LMP 07/06/2023   SpO2 99%      Pelvic hike on the left on prone examination today.  Trigger point in the iliacus bilaterally today.    P: palpatory tenderness left SI joint, mild iliacus bilaterally   A: static palpation demonstrates intersegmental asymmetry , pelvis  R: motion palpation notes restricted motion  T: hypertonicity at:  Iliacus:  Bilaterally    Segmental spinal dysfunction/restrictions found at:  :  Sacrum Left lateral flexion restricted and Extension restriction.    Assessment: Patient had initial improvement.  Pain did return after working.  I do believe that there is a hip flexor component, as pregnancy progresses and lordosis increases patient may be suffering from iliopsoas hypertonicity following sitting for prolonged periods.  Will perform iliac us release today.    Diagnoses:      1. Segmental and somatic dysfunction of  sacral region    2. Bilateral low back pain without sciatica, unspecified chronicity        Patient's condition:  Patient had restrictions pre-manipulation    Treatment effectiveness:  Post manipulation there is better intersegmental movement      Procedures:  CMT:  93448 Chiropractic manipulative treatment 1-2 regions performed   Pelvis: Diversified, Sacrum , Side posture    Modalities:  None performed this visit    Therapeutic procedures:  12033: Manual therapy, myofascial release of the iliacus muscle bilaterally performed supine with knee flexion.  Trigger point access over the ASIS bilaterally.  Treatment time 5 minutes    Response to Treatment  Patient tolerated treatment well, patient reported a sense of feeling good and looseness in the hip flexors immediately posttreatment.    Prognosis: Good    Progress towards Goals: Patient is making progress towards the goal.     Recommendations:    Instructions: Patient is advised to maintain mobility, was given stretches for hip flexors    Follow-up: Recommended follow-up in 1 week

## 2024-02-12 ASSESSMENT — PATIENT HEALTH QUESTIONNAIRE - PHQ9
SUM OF ALL RESPONSES TO PHQ QUESTIONS 1-9: 1
SUM OF ALL RESPONSES TO PHQ QUESTIONS 1-9: 1
10. IF YOU CHECKED OFF ANY PROBLEMS, HOW DIFFICULT HAVE THESE PROBLEMS MADE IT FOR YOU TO DO YOUR WORK, TAKE CARE OF THINGS AT HOME, OR GET ALONG WITH OTHER PEOPLE: NOT DIFFICULT AT ALL

## 2024-02-13 ENCOUNTER — HOSPITAL ENCOUNTER (OUTPATIENT)
Dept: ULTRASOUND IMAGING | Facility: OTHER | Age: 34
Discharge: HOME | End: 2024-02-13
Attending: OBSTETRICS & GYNECOLOGY
Payer: COMMERCIAL

## 2024-02-13 ENCOUNTER — PRENATAL OFFICE VISIT (OUTPATIENT)
Dept: OBGYN | Facility: OTHER | Age: 34
End: 2024-02-13
Attending: OBSTETRICS & GYNECOLOGY
Payer: COMMERCIAL

## 2024-02-13 VITALS
DIASTOLIC BLOOD PRESSURE: 80 MMHG | BODY MASS INDEX: 37.21 KG/M2 | SYSTOLIC BLOOD PRESSURE: 122 MMHG | WEIGHT: 252 LBS | HEART RATE: 80 BPM

## 2024-02-13 DIAGNOSIS — Z34.82 ENCOUNTER FOR SUPERVISION OF OTHER NORMAL PREGNANCY IN SECOND TRIMESTER: ICD-10-CM

## 2024-02-13 DIAGNOSIS — Z34.83 ENCOUNTER FOR SUPERVISION OF OTHER NORMAL PREGNANCY IN THIRD TRIMESTER: Primary | ICD-10-CM

## 2024-02-13 PROCEDURE — 90678 RSV VACC PREF BIVALENT IM: CPT | Performed by: OBSTETRICS & GYNECOLOGY

## 2024-02-13 PROCEDURE — 99207 PR OB VISIT-NO CHARGE - GICH ONLY: CPT | Performed by: OBSTETRICS & GYNECOLOGY

## 2024-02-13 PROCEDURE — 90471 IMMUNIZATION ADMIN: CPT | Performed by: OBSTETRICS & GYNECOLOGY

## 2024-02-13 PROCEDURE — 76816 OB US FOLLOW-UP PER FETUS: CPT

## 2024-02-13 ASSESSMENT — PAIN SCALES - GENERAL: PAINLEVEL: NO PAIN (0)

## 2024-02-13 NOTE — NURSING NOTE
Chief Complaint   Patient presents with    Prenatal Care     32w1d       Medication Reconciliation: complete    Pt presents to clinic today for prenatal care. Pt denies any bleeding, or leakage of fluid at this time. States baby is moving good. Patient denies contractions.       Sara Lewis LPN........................2/13/2024  8:59 AM

## 2024-02-13 NOTE — PROGRESS NOTES
Return OB Visit    S: Patient is feeling well. No ctx, VB or LOF. +FM    Has cuff at home, not checking BPs    O: /80 (BP Location: Right arm, Patient Position: Sitting, Cuff Size: Adult Large)   Pulse 80   Wt 114.3 kg (252 lb)   LMP 2023   BMI 37.21 kg/m    Gen: Well-appearing, NAD  See OB Flowsheet    A/P:  Kizzy Chan is a 33 year old  at 32w1d by 9w1d US, here for return OB visit.  Hx of recurrent pregnancy loss: s/p progesterone suppositories     Hx of Gestational HTN vs chronic HTN:   - baseline HELLP labs, UPC normal.   - started ASA 81mg at 12 weeks  - encouraged to check BPs at home     Subchorionic hemorrhage     Low lying placenta: resolved 2024      GERD: pepcid  Plans breastfeeding, epidural, Mirena     PNC: Rh positive, RI,   Genetics: normal aneuploidy screening  Imaging: dating US at 9w1d, anatomy survey normal  Immunizations: s/p COVID series, s/p flu shot at work. Tdap 2024. RSV 2024   RTC 2 weeks    Mayi Benavidez MD FACOG  OB/GYN  2024 9:31 AM

## 2024-02-16 ENCOUNTER — PRENATAL OFFICE VISIT (OUTPATIENT)
Dept: OBGYN | Facility: OTHER | Age: 34
End: 2024-02-16
Payer: COMMERCIAL

## 2024-02-16 ENCOUNTER — TELEPHONE (OUTPATIENT)
Dept: OBGYN | Facility: OTHER | Age: 34
End: 2024-02-16
Payer: COMMERCIAL

## 2024-02-16 VITALS
WEIGHT: 250 LBS | BODY MASS INDEX: 36.92 KG/M2 | DIASTOLIC BLOOD PRESSURE: 82 MMHG | HEART RATE: 84 BPM | SYSTOLIC BLOOD PRESSURE: 138 MMHG

## 2024-02-16 DIAGNOSIS — N89.8 VAGINAL DISCHARGE: Primary | ICD-10-CM

## 2024-02-16 LAB
BACTERIAL VAGINOSIS VAG-IMP: NEGATIVE
CANDIDA DNA VAG QL NAA+PROBE: DETECTED
CANDIDA GLABRATA / CANDIDA KRUSEI DNA: NOT DETECTED
T VAGINALIS DNA VAG QL NAA+PROBE: NOT DETECTED

## 2024-02-16 PROCEDURE — 0352U MULTIPLEX VAGINAL PANEL BY PCR: CPT | Mod: ZL

## 2024-02-16 PROCEDURE — 99207 PR OB VISIT-NO CHARGE - GICH ONLY: CPT

## 2024-02-16 NOTE — NURSING NOTE
Chief Complaint   Patient presents with    Prenatal Care     OB concerns 32w4d       Medication Reconciliation: complete    Pt presents to clinic today for prenatal care 32w4d. Pt denies any leakage of fluid at this time. She stated that she had some spotting this morning.  States baby is moving good. Patient denies contractions.       Sabina Kenyon LPN

## 2024-02-16 NOTE — PROGRESS NOTES
OB Visit    S: Patient is feeling well. Denies LO, or regular Ctx.  + FM. Notes some increased discharge yesterday as well as spotting that began this am. No recent intercourse. She reports itching as well and thinks it is BV related.       O: /82   Pulse 84   Wt 113.4 kg (250 lb)   LMP 2023   BMI 36.92 kg/m    Gen: Well-appearing, NAD  Pelvic:  Normal appearing external female genitalia. Normal hair distribution. Vagina is without lesions. Moderate white yellow discharge. Cervix noted to have irritation and bleeding at posterior portions at 5-6 oclock. No uterine bleeding.         A/P:  Kizzy Chan is a 33 year old  at 32w4d \here for possible BV and spotting due to this. Reassured based on exam that this is cervical bleeding and not uterine. MVP collected and will treat as needed.     RTC as scheduled for OB.       NAGELITA Redman-C  2024 11:46 AM

## 2024-02-16 NOTE — TELEPHONE ENCOUNTER
After verifying pt last name and date of birth, pt states she has had some light spotting when she wipes this morning. Pt confirms baby is moving well and denies leaking of fluid. Pt state spotting is very light. Pt states she thinks she has BV, she is having irritation and burning. Pt denies having sexual intercourse in the last couple days.     Appointment offered this morning with NEVIN Mares CNP, RN on 2/16/2024 at 9:10 AM

## 2024-02-25 ENCOUNTER — HEALTH MAINTENANCE LETTER (OUTPATIENT)
Age: 34
End: 2024-02-25

## 2024-02-27 ENCOUNTER — PRENATAL OFFICE VISIT (OUTPATIENT)
Dept: OBGYN | Facility: OTHER | Age: 34
End: 2024-02-27
Attending: OBSTETRICS & GYNECOLOGY
Payer: COMMERCIAL

## 2024-02-27 VITALS
HEART RATE: 88 BPM | WEIGHT: 252 LBS | SYSTOLIC BLOOD PRESSURE: 132 MMHG | DIASTOLIC BLOOD PRESSURE: 78 MMHG | BODY MASS INDEX: 37.21 KG/M2

## 2024-02-27 DIAGNOSIS — Z34.83 ENCOUNTER FOR SUPERVISION OF OTHER NORMAL PREGNANCY IN THIRD TRIMESTER: Primary | ICD-10-CM

## 2024-02-27 PROCEDURE — 99207 PR OB VISIT-NO CHARGE - GICH ONLY: CPT | Performed by: OBSTETRICS & GYNECOLOGY

## 2024-02-27 RX ORDER — BREAST PUMP
1 EACH MISCELLANEOUS PRN
Qty: 1 EACH | Refills: 0 | Status: SHIPPED | OUTPATIENT
Start: 2024-02-27

## 2024-02-27 ASSESSMENT — PAIN SCALES - GENERAL: PAINLEVEL: NO PAIN (0)

## 2024-02-27 NOTE — NURSING NOTE
Chief Complaint   Patient presents with    Prenatal Care     34w1d       Medication Reconciliation: complete  Pt presents to clinic today for prenatal care. Pt denies any bleeding, or leakage of fluid at this time. States baby is moving good. Patient denies contractions.       Sara Lewis LPN........................2/27/2024  9:57 AM

## 2024-02-27 NOTE — PROGRESS NOTES
Return OB Visit    S: Patient is feeling well. No ctx, VB or LOF. +FM    BP checks at home have been normal    O: /78 (BP Location: Right arm, Patient Position: Sitting, Cuff Size: Adult Large)   Pulse 88   Wt 114.3 kg (252 lb)   LMP 2023   BMI 37.21 kg/m    Gen: Well-appearing, NAD  See OB Flowsheet    A/P:  Kizzy Chan is a 33 year old  at 34w1d by 9w1d US, here for return OB visit.  Hx of recurrent pregnancy loss: s/p progesterone suppositories     Hx of Gestational HTN vs chronic HTN:   - baseline HELLP labs, UPC normal.   - started ASA 81mg at 12 weeks  - encouraged to check BPs at home     Subchorionic hemorrhage     Low lying placenta: resolved 2024      GERD: pepcid  Plans breastfeeding, epidural, Mirena     PNC: Rh positive, RI,   Genetics: normal aneuploidy screening  Imaging: dating US at 9w1d, anatomy survey normal  Immunizations: s/p COVID series, s/p flu shot at work. Tdap 2024. RSV 2024   RTC 2 weeks- GBS next visit    Mayi Benavidez MD FACOG  OB/GYN  2024 10:10 AM

## 2024-03-12 ENCOUNTER — PRENATAL OFFICE VISIT (OUTPATIENT)
Dept: OBGYN | Facility: OTHER | Age: 34
End: 2024-03-12
Attending: OBSTETRICS & GYNECOLOGY
Payer: COMMERCIAL

## 2024-03-12 VITALS
BODY MASS INDEX: 37.51 KG/M2 | WEIGHT: 254 LBS | DIASTOLIC BLOOD PRESSURE: 86 MMHG | SYSTOLIC BLOOD PRESSURE: 144 MMHG | HEART RATE: 84 BPM

## 2024-03-12 DIAGNOSIS — O16.3 HYPERTENSION DURING PREGNANCY IN THIRD TRIMESTER, UNSPECIFIED HYPERTENSION IN PREGNANCY TYPE: ICD-10-CM

## 2024-03-12 DIAGNOSIS — Z34.83 ENCOUNTER FOR SUPERVISION OF OTHER NORMAL PREGNANCY IN THIRD TRIMESTER: Primary | ICD-10-CM

## 2024-03-12 DIAGNOSIS — N89.8 VAGINAL ITCHING: ICD-10-CM

## 2024-03-12 LAB
ALBUMIN MFR UR ELPH: 18.7 MG/DL
ALBUMIN SERPL BCG-MCNC: 3.5 G/DL (ref 3.5–5.2)
ALP SERPL-CCNC: 128 U/L (ref 40–150)
ALT SERPL W P-5'-P-CCNC: 29 U/L (ref 0–50)
ANION GAP SERPL CALCULATED.3IONS-SCNC: 12 MMOL/L (ref 7–15)
AST SERPL W P-5'-P-CCNC: 28 U/L (ref 0–45)
BACTERIAL VAGINOSIS VAG-IMP: NEGATIVE
BILIRUB SERPL-MCNC: 0.2 MG/DL
BUN SERPL-MCNC: 9.9 MG/DL (ref 6–20)
CALCIUM SERPL-MCNC: 9.2 MG/DL (ref 8.6–10)
CANDIDA DNA VAG QL NAA+PROBE: NOT DETECTED
CANDIDA GLABRATA / CANDIDA KRUSEI DNA: NOT DETECTED
CHLORIDE SERPL-SCNC: 106 MMOL/L (ref 98–107)
CREAT SERPL-MCNC: 0.64 MG/DL (ref 0.51–0.95)
CREAT UR-MCNC: 113.4 MG/DL
DEPRECATED HCO3 PLAS-SCNC: 18 MMOL/L (ref 22–29)
EGFRCR SERPLBLD CKD-EPI 2021: >90 ML/MIN/1.73M2
ERYTHROCYTE [DISTWIDTH] IN BLOOD BY AUTOMATED COUNT: 12.9 % (ref 10–15)
GLUCOSE SERPL-MCNC: 88 MG/DL (ref 70–99)
HCT VFR BLD AUTO: 32.8 % (ref 35–47)
HGB BLD-MCNC: 11.1 G/DL (ref 11.7–15.7)
MCH RBC QN AUTO: 29.4 PG (ref 26.5–33)
MCHC RBC AUTO-ENTMCNC: 33.8 G/DL (ref 31.5–36.5)
MCV RBC AUTO: 87 FL (ref 78–100)
PLATELET # BLD AUTO: 204 10E3/UL (ref 150–450)
POTASSIUM SERPL-SCNC: 4.1 MMOL/L (ref 3.4–5.3)
PROT SERPL-MCNC: 6.3 G/DL (ref 6.4–8.3)
PROT/CREAT 24H UR: 0.16 MG/MG CR (ref 0–0.2)
RBC # BLD AUTO: 3.78 10E6/UL (ref 3.8–5.2)
SODIUM SERPL-SCNC: 136 MMOL/L (ref 135–145)
T VAGINALIS DNA VAG QL NAA+PROBE: NOT DETECTED
WBC # BLD AUTO: 9.3 10E3/UL (ref 4–11)

## 2024-03-12 PROCEDURE — 87186 SC STD MICRODIL/AGAR DIL: CPT | Mod: ZL | Performed by: OBSTETRICS & GYNECOLOGY

## 2024-03-12 PROCEDURE — 84156 ASSAY OF PROTEIN URINE: CPT | Mod: ZL | Performed by: OBSTETRICS & GYNECOLOGY

## 2024-03-12 PROCEDURE — 85027 COMPLETE CBC AUTOMATED: CPT | Mod: ZL | Performed by: OBSTETRICS & GYNECOLOGY

## 2024-03-12 PROCEDURE — 99207 PR OB VISIT-NO CHARGE - GICH ONLY: CPT | Performed by: OBSTETRICS & GYNECOLOGY

## 2024-03-12 PROCEDURE — 0352U MULTIPLEX VAGINAL PANEL BY PCR: CPT | Mod: ZL | Performed by: OBSTETRICS & GYNECOLOGY

## 2024-03-12 PROCEDURE — 80053 COMPREHEN METABOLIC PANEL: CPT | Mod: ZL | Performed by: OBSTETRICS & GYNECOLOGY

## 2024-03-12 PROCEDURE — 87653 STREP B DNA AMP PROBE: CPT | Mod: ZL | Performed by: OBSTETRICS & GYNECOLOGY

## 2024-03-12 PROCEDURE — 36415 COLL VENOUS BLD VENIPUNCTURE: CPT | Mod: ZL | Performed by: OBSTETRICS & GYNECOLOGY

## 2024-03-12 ASSESSMENT — PAIN SCALES - GENERAL: PAINLEVEL: NO PAIN (0)

## 2024-03-12 NOTE — PROGRESS NOTES
Return OB Visit    S: Patient is feeling okay. Has been getting more swollen by the end of the day. Has been having some headaches in the evenings but resolve with tylenol and rest. BPs have been 140s/high 80s. No regular ctx, VB or LOF. +FM. Is having some vaginal irritation, worried she has BV or yeast.    O: BP (!) 144/86   Pulse 84   Wt 115.2 kg (254 lb)   LMP 2023   BMI 37.51 kg/m    Gen: Well-appearing, NAD  See OB Flowsheet    A/P:  Kizzy Chan is a 33 year old  at 36w1d  by 9w1d US, here for return OB visit.  Hx of recurrent pregnancy loss: s/p progesterone suppositories     Hx of Gestational HTN vs chronic HTN:   - baseline HELLP labs, UPC normal.   - started ASA 81mg at 12 weeks  - encouraged to check BPs at home    Elevated BP: First episode in clinic today. Has been having mild BPs at home. Will get HELLP labs, UPC.     Subchorionic hemorrhage     Low lying placenta: resolved 2024      GERD: pepcid  Plans breastfeeding, epidural, Mirena     PNC: Rh positive, RI,   Genetics: normal aneuploidy screening  Imaging: dating US at 9w1d, anatomy survey normal  Immunizations: s/p COVID series, s/p flu shot at work. Tdap 2024. RSV 2024   RTC 2-3 days for BP check    Mayi Benavidez MD FACOG  OB/GYN  3/12/2024 11:14 AM

## 2024-03-12 NOTE — NURSING NOTE
"Chief Complaint   Patient presents with    Prenatal Care     36w1d       Initial BP (!) 144/86   Pulse 84   Wt 115.2 kg (254 lb)   LMP 07/06/2023   BMI 37.51 kg/m   Estimated body mass index is 37.51 kg/m  as calculated from the following:    Height as of 9/5/23: 1.753 m (5' 9\").    Weight as of this encounter: 115.2 kg (254 lb).  Medication Reconciliation: complete      Pt denies any contractions, bleeding, or leakage of fluid at this time. Pt states baby is moving well and has no concerns at this time. She does have some concerns over higher blood pressure readings at home as well as some swelling in her hands and feet at the end of the day. Some headaches noted but denies vision changes.      Cristel Chandler RN    "

## 2024-03-13 LAB — GP B STREP DNA SPEC QL NAA+PROBE: POSITIVE

## 2024-03-14 ENCOUNTER — TELEPHONE (OUTPATIENT)
Dept: OBGYN | Facility: OTHER | Age: 34
End: 2024-03-14
Payer: COMMERCIAL

## 2024-03-14 NOTE — TELEPHONE ENCOUNTER
Patient came to nurses station for BP check. 128/92  Routing to provider for update.  Cristel Chandler RN on 3/14/2024 at 3:13 PM

## 2024-03-16 LAB — BACTERIA SPEC CULT: ABNORMAL

## 2024-03-17 RX ORDER — CARBOPROST TROMETHAMINE 250 UG/ML
250 INJECTION, SOLUTION INTRAMUSCULAR
Status: CANCELLED | OUTPATIENT
Start: 2024-03-17

## 2024-03-17 RX ORDER — CITRIC ACID/SODIUM CITRATE 334-500MG
30 SOLUTION, ORAL ORAL
Status: CANCELLED | OUTPATIENT
Start: 2024-03-17

## 2024-03-17 RX ORDER — KETOROLAC TROMETHAMINE 30 MG/ML
30 INJECTION, SOLUTION INTRAMUSCULAR; INTRAVENOUS
Status: CANCELLED | OUTPATIENT
Start: 2024-03-17 | End: 2024-03-22

## 2024-03-17 RX ORDER — HYDRALAZINE HYDROCHLORIDE 20 MG/ML
10 INJECTION INTRAMUSCULAR; INTRAVENOUS
Status: CANCELLED | OUTPATIENT
Start: 2024-03-17

## 2024-03-17 RX ORDER — NALOXONE HYDROCHLORIDE 0.4 MG/ML
0.4 INJECTION, SOLUTION INTRAMUSCULAR; INTRAVENOUS; SUBCUTANEOUS
Status: CANCELLED | OUTPATIENT
Start: 2024-03-17

## 2024-03-17 RX ORDER — METHYLERGONOVINE MALEATE 0.2 MG/ML
200 INJECTION INTRAVENOUS
Status: CANCELLED | OUTPATIENT
Start: 2024-03-17

## 2024-03-17 RX ORDER — SODIUM CHLORIDE, SODIUM LACTATE, POTASSIUM CHLORIDE, CALCIUM CHLORIDE 600; 310; 30; 20 MG/100ML; MG/100ML; MG/100ML; MG/100ML
10-125 INJECTION, SOLUTION INTRAVENOUS CONTINUOUS
Status: CANCELLED | OUTPATIENT
Start: 2024-03-17

## 2024-03-17 RX ORDER — METOCLOPRAMIDE HYDROCHLORIDE 5 MG/ML
10 INJECTION INTRAMUSCULAR; INTRAVENOUS EVERY 6 HOURS PRN
Status: CANCELLED | OUTPATIENT
Start: 2024-03-17

## 2024-03-17 RX ORDER — OXYTOCIN/0.9 % SODIUM CHLORIDE 30/500 ML
1-24 PLASTIC BAG, INJECTION (ML) INTRAVENOUS CONTINUOUS
Status: CANCELLED | OUTPATIENT
Start: 2024-03-17

## 2024-03-17 RX ORDER — MISOPROSTOL 100 UG/1
400 TABLET ORAL
Status: CANCELLED | OUTPATIENT
Start: 2024-03-17

## 2024-03-17 RX ORDER — ONDANSETRON 2 MG/ML
4 INJECTION INTRAMUSCULAR; INTRAVENOUS EVERY 6 HOURS PRN
Status: CANCELLED | OUTPATIENT
Start: 2024-03-17

## 2024-03-17 RX ORDER — OXYTOCIN/0.9 % SODIUM CHLORIDE 30/500 ML
340 PLASTIC BAG, INJECTION (ML) INTRAVENOUS CONTINUOUS PRN
Status: CANCELLED | OUTPATIENT
Start: 2024-03-17

## 2024-03-17 RX ORDER — LIDOCAINE 40 MG/G
CREAM TOPICAL
Status: CANCELLED | OUTPATIENT
Start: 2024-03-17

## 2024-03-17 RX ORDER — LOPERAMIDE HCL 2 MG
2 CAPSULE ORAL
Status: CANCELLED | OUTPATIENT
Start: 2024-03-17

## 2024-03-17 RX ORDER — FENTANYL CITRATE 50 UG/ML
50 INJECTION, SOLUTION INTRAMUSCULAR; INTRAVENOUS EVERY 30 MIN PRN
Status: CANCELLED | OUTPATIENT
Start: 2024-03-17

## 2024-03-17 RX ORDER — TERBUTALINE SULFATE 1 MG/ML
0.25 INJECTION, SOLUTION SUBCUTANEOUS
Status: CANCELLED | OUTPATIENT
Start: 2024-03-17

## 2024-03-17 RX ORDER — SODIUM CHLORIDE, SODIUM LACTATE, POTASSIUM CHLORIDE, CALCIUM CHLORIDE 600; 310; 30; 20 MG/100ML; MG/100ML; MG/100ML; MG/100ML
INJECTION, SOLUTION INTRAVENOUS CONTINUOUS
Status: CANCELLED | OUTPATIENT
Start: 2024-03-17

## 2024-03-17 RX ORDER — OXYTOCIN 10 [USP'U]/ML
10 INJECTION, SOLUTION INTRAMUSCULAR; INTRAVENOUS
Status: CANCELLED | OUTPATIENT
Start: 2024-03-17

## 2024-03-17 RX ORDER — LOPERAMIDE HCL 2 MG
4 CAPSULE ORAL
Status: CANCELLED | OUTPATIENT
Start: 2024-03-17

## 2024-03-17 RX ORDER — METOCLOPRAMIDE 10 MG/1
10 TABLET ORAL EVERY 6 HOURS PRN
Status: CANCELLED | OUTPATIENT
Start: 2024-03-17

## 2024-03-17 RX ORDER — OXYTOCIN/0.9 % SODIUM CHLORIDE 30/500 ML
100-340 PLASTIC BAG, INJECTION (ML) INTRAVENOUS CONTINUOUS PRN
Status: CANCELLED | OUTPATIENT
Start: 2024-03-17

## 2024-03-17 RX ORDER — TRANEXAMIC ACID 10 MG/ML
1 INJECTION, SOLUTION INTRAVENOUS EVERY 30 MIN PRN
Status: CANCELLED | OUTPATIENT
Start: 2024-03-17

## 2024-03-17 RX ORDER — PROCHLORPERAZINE MALEATE 10 MG
10 TABLET ORAL EVERY 6 HOURS PRN
Status: CANCELLED | OUTPATIENT
Start: 2024-03-17

## 2024-03-17 RX ORDER — PROCHLORPERAZINE 25 MG
25 SUPPOSITORY, RECTAL RECTAL EVERY 12 HOURS PRN
Status: CANCELLED | OUTPATIENT
Start: 2024-03-17

## 2024-03-17 RX ORDER — ONDANSETRON 4 MG/1
4 TABLET, ORALLY DISINTEGRATING ORAL EVERY 6 HOURS PRN
Status: CANCELLED | OUTPATIENT
Start: 2024-03-17

## 2024-03-17 RX ORDER — NALOXONE HYDROCHLORIDE 0.4 MG/ML
0.2 INJECTION, SOLUTION INTRAMUSCULAR; INTRAVENOUS; SUBCUTANEOUS
Status: CANCELLED | OUTPATIENT
Start: 2024-03-17

## 2024-03-17 RX ORDER — SODIUM CHLORIDE, SODIUM LACTATE, POTASSIUM CHLORIDE, CALCIUM CHLORIDE 600; 310; 30; 20 MG/100ML; MG/100ML; MG/100ML; MG/100ML
INJECTION, SOLUTION INTRAVENOUS CONTINUOUS PRN
Status: CANCELLED | OUTPATIENT
Start: 2024-03-17

## 2024-03-17 RX ORDER — IBUPROFEN 200 MG
800 TABLET ORAL
Status: CANCELLED | OUTPATIENT
Start: 2024-03-17 | End: 2024-03-22

## 2024-03-17 RX ORDER — LABETALOL 20 MG/4 ML (5 MG/ML) INTRAVENOUS SYRINGE
20-80 EVERY 10 MIN PRN
Status: CANCELLED | OUTPATIENT
Start: 2024-03-17

## 2024-03-19 ENCOUNTER — ANESTHESIA EVENT (OUTPATIENT)
Dept: OBGYN | Facility: OTHER | Age: 34
End: 2024-03-19
Payer: COMMERCIAL

## 2024-03-19 ENCOUNTER — ANESTHESIA (OUTPATIENT)
Dept: OBGYN | Facility: OTHER | Age: 34
End: 2024-03-19
Payer: COMMERCIAL

## 2024-03-19 ENCOUNTER — HOSPITAL ENCOUNTER (INPATIENT)
Facility: OTHER | Age: 34
LOS: 1 days | Discharge: HOME | End: 2024-03-20
Attending: OBSTETRICS & GYNECOLOGY | Admitting: OBSTETRICS & GYNECOLOGY
Payer: COMMERCIAL

## 2024-03-19 PROBLEM — Z34.83 ENCOUNTER FOR SUPERVISION OF OTHER NORMAL PREGNANCY IN THIRD TRIMESTER: Status: ACTIVE | Noted: 2024-03-19

## 2024-03-19 LAB
ABO/RH(D): NORMAL
ALBUMIN MFR UR ELPH: 26.3 MG/DL
ALBUMIN SERPL BCG-MCNC: 3.4 G/DL (ref 3.5–5.2)
ALP SERPL-CCNC: 153 U/L (ref 40–150)
ALT SERPL W P-5'-P-CCNC: 34 U/L (ref 0–50)
ANION GAP SERPL CALCULATED.3IONS-SCNC: 14 MMOL/L (ref 7–15)
ANTIBODY SCREEN: NEGATIVE
AST SERPL W P-5'-P-CCNC: 31 U/L (ref 0–45)
BILIRUB SERPL-MCNC: 0.3 MG/DL
BUN SERPL-MCNC: 9.3 MG/DL (ref 6–20)
CALCIUM SERPL-MCNC: 9.3 MG/DL (ref 8.6–10)
CHLORIDE SERPL-SCNC: 105 MMOL/L (ref 98–107)
CREAT SERPL-MCNC: 0.74 MG/DL (ref 0.51–0.95)
CREAT UR-MCNC: 147.8 MG/DL
DEPRECATED HCO3 PLAS-SCNC: 18 MMOL/L (ref 22–29)
EGFRCR SERPLBLD CKD-EPI 2021: >90 ML/MIN/1.73M2
ERYTHROCYTE [DISTWIDTH] IN BLOOD BY AUTOMATED COUNT: 12.9 % (ref 10–15)
GLUCOSE SERPL-MCNC: 105 MG/DL (ref 70–99)
HCT VFR BLD AUTO: 34.1 % (ref 35–47)
HGB BLD-MCNC: 11.6 G/DL (ref 11.7–15.7)
HOLD SPECIMEN: NORMAL
MCH RBC QN AUTO: 29.5 PG (ref 26.5–33)
MCHC RBC AUTO-ENTMCNC: 34 G/DL (ref 31.5–36.5)
MCV RBC AUTO: 87 FL (ref 78–100)
PLATELET # BLD AUTO: 234 10E3/UL (ref 150–450)
POTASSIUM SERPL-SCNC: 3.9 MMOL/L (ref 3.4–5.3)
PROT SERPL-MCNC: 6.5 G/DL (ref 6.4–8.3)
PROT/CREAT 24H UR: 0.18 MG/MG CR (ref 0–0.2)
RBC # BLD AUTO: 3.93 10E6/UL (ref 3.8–5.2)
SODIUM SERPL-SCNC: 137 MMOL/L (ref 135–145)
SPECIMEN EXPIRATION DATE: NORMAL
T PALLIDUM AB SER QL: NONREACTIVE
WBC # BLD AUTO: 12 10E3/UL (ref 4–11)

## 2024-03-19 PROCEDURE — 250N000011 HC RX IP 250 OP 636: Performed by: NURSE ANESTHETIST, CERTIFIED REGISTERED

## 2024-03-19 PROCEDURE — 59400 OBSTETRICAL CARE: CPT | Performed by: OBSTETRICS & GYNECOLOGY

## 2024-03-19 PROCEDURE — 10H07YZ INSERTION OF OTHER DEVICE INTO PRODUCTS OF CONCEPTION, VIA NATURAL OR ARTIFICIAL OPENING: ICD-10-PCS | Performed by: OBSTETRICS & GYNECOLOGY

## 2024-03-19 PROCEDURE — 120N000001 HC R&B MED SURG/OB

## 2024-03-19 PROCEDURE — 84156 ASSAY OF PROTEIN URINE: CPT | Performed by: OBSTETRICS & GYNECOLOGY

## 2024-03-19 PROCEDURE — 370N000003 HC ANESTHESIA WARD SERVICE

## 2024-03-19 PROCEDURE — 36415 COLL VENOUS BLD VENIPUNCTURE: CPT | Performed by: OBSTETRICS & GYNECOLOGY

## 2024-03-19 PROCEDURE — 80053 COMPREHEN METABOLIC PANEL: CPT | Performed by: OBSTETRICS & GYNECOLOGY

## 2024-03-19 PROCEDURE — 3E033VJ INTRODUCTION OF OTHER HORMONE INTO PERIPHERAL VEIN, PERCUTANEOUS APPROACH: ICD-10-PCS | Performed by: OBSTETRICS & GYNECOLOGY

## 2024-03-19 PROCEDURE — 59400 OBSTETRICAL CARE: CPT | Performed by: NURSE ANESTHETIST, CERTIFIED REGISTERED

## 2024-03-19 PROCEDURE — 722N000001 HC LABOR CARE VAGINAL DELIVERY SINGLE

## 2024-03-19 PROCEDURE — 86780 TREPONEMA PALLIDUM: CPT | Performed by: OBSTETRICS & GYNECOLOGY

## 2024-03-19 PROCEDURE — 0HQ9XZZ REPAIR PERINEUM SKIN, EXTERNAL APPROACH: ICD-10-PCS | Performed by: OBSTETRICS & GYNECOLOGY

## 2024-03-19 PROCEDURE — 10907ZC DRAINAGE OF AMNIOTIC FLUID, THERAPEUTIC FROM PRODUCTS OF CONCEPTION, VIA NATURAL OR ARTIFICIAL OPENING: ICD-10-PCS | Performed by: OBSTETRICS & GYNECOLOGY

## 2024-03-19 PROCEDURE — 258N000003 HC RX IP 258 OP 636: Performed by: OBSTETRICS & GYNECOLOGY

## 2024-03-19 PROCEDURE — 86900 BLOOD TYPING SEROLOGIC ABO: CPT | Performed by: OBSTETRICS & GYNECOLOGY

## 2024-03-19 PROCEDURE — 250N000011 HC RX IP 250 OP 636: Performed by: OBSTETRICS & GYNECOLOGY

## 2024-03-19 PROCEDURE — 250N000009 HC RX 250: Performed by: OBSTETRICS & GYNECOLOGY

## 2024-03-19 PROCEDURE — 85027 COMPLETE CBC AUTOMATED: CPT | Performed by: OBSTETRICS & GYNECOLOGY

## 2024-03-19 PROCEDURE — 250N000009 HC RX 250: Performed by: NURSE ANESTHETIST, CERTIFIED REGISTERED

## 2024-03-19 RX ORDER — SODIUM CHLORIDE, SODIUM LACTATE, POTASSIUM CHLORIDE, CALCIUM CHLORIDE 600; 310; 30; 20 MG/100ML; MG/100ML; MG/100ML; MG/100ML
INJECTION, SOLUTION INTRAVENOUS CONTINUOUS
Status: DISCONTINUED | OUTPATIENT
Start: 2024-03-19 | End: 2024-03-19 | Stop reason: HOSPADM

## 2024-03-19 RX ORDER — OXYTOCIN 10 [USP'U]/ML
10 INJECTION, SOLUTION INTRAMUSCULAR; INTRAVENOUS
Status: DISCONTINUED | OUTPATIENT
Start: 2024-03-19 | End: 2024-03-19 | Stop reason: HOSPADM

## 2024-03-19 RX ORDER — MISOPROSTOL 100 UG/1
400 TABLET ORAL
Status: DISCONTINUED | OUTPATIENT
Start: 2024-03-19 | End: 2024-03-20 | Stop reason: HOSPADM

## 2024-03-19 RX ORDER — IBUPROFEN 400 MG/1
800 TABLET, FILM COATED ORAL
Status: DISCONTINUED | OUTPATIENT
Start: 2024-03-19 | End: 2024-03-20

## 2024-03-19 RX ORDER — OXYTOCIN/0.9 % SODIUM CHLORIDE 30/500 ML
340 PLASTIC BAG, INJECTION (ML) INTRAVENOUS CONTINUOUS PRN
Status: DISCONTINUED | OUTPATIENT
Start: 2024-03-19 | End: 2024-03-19 | Stop reason: HOSPADM

## 2024-03-19 RX ORDER — NALBUPHINE HYDROCHLORIDE 20 MG/ML
2.5-5 INJECTION, SOLUTION INTRAMUSCULAR; INTRAVENOUS; SUBCUTANEOUS EVERY 6 HOURS PRN
Status: DISCONTINUED | OUTPATIENT
Start: 2024-03-19 | End: 2024-03-20

## 2024-03-19 RX ORDER — ONDANSETRON 4 MG/1
4 TABLET, ORALLY DISINTEGRATING ORAL EVERY 6 HOURS PRN
Status: DISCONTINUED | OUTPATIENT
Start: 2024-03-19 | End: 2024-03-19 | Stop reason: HOSPADM

## 2024-03-19 RX ORDER — METOCLOPRAMIDE 10 MG/1
10 TABLET ORAL EVERY 6 HOURS PRN
Status: DISCONTINUED | OUTPATIENT
Start: 2024-03-19 | End: 2024-03-19 | Stop reason: HOSPADM

## 2024-03-19 RX ORDER — PROCHLORPERAZINE MALEATE 10 MG
10 TABLET ORAL EVERY 6 HOURS PRN
Status: DISCONTINUED | OUTPATIENT
Start: 2024-03-19 | End: 2024-03-19 | Stop reason: HOSPADM

## 2024-03-19 RX ORDER — FENTANYL CITRATE 50 UG/ML
50 INJECTION, SOLUTION INTRAMUSCULAR; INTRAVENOUS EVERY 30 MIN PRN
Status: DISCONTINUED | OUTPATIENT
Start: 2024-03-19 | End: 2024-03-19 | Stop reason: HOSPADM

## 2024-03-19 RX ORDER — CITRIC ACID/SODIUM CITRATE 334-500MG
30 SOLUTION, ORAL ORAL
Status: DISCONTINUED | OUTPATIENT
Start: 2024-03-19 | End: 2024-03-19 | Stop reason: HOSPADM

## 2024-03-19 RX ORDER — NALOXONE HYDROCHLORIDE 0.4 MG/ML
0.4 INJECTION, SOLUTION INTRAMUSCULAR; INTRAVENOUS; SUBCUTANEOUS
Status: DISCONTINUED | OUTPATIENT
Start: 2024-03-19 | End: 2024-03-19 | Stop reason: HOSPADM

## 2024-03-19 RX ORDER — LOPERAMIDE HCL 2 MG
4 CAPSULE ORAL
Status: DISCONTINUED | OUTPATIENT
Start: 2024-03-19 | End: 2024-03-20 | Stop reason: HOSPADM

## 2024-03-19 RX ORDER — METHYLERGONOVINE MALEATE 0.2 MG/ML
200 INJECTION INTRAVENOUS
Status: DISCONTINUED | OUTPATIENT
Start: 2024-03-19 | End: 2024-03-20 | Stop reason: HOSPADM

## 2024-03-19 RX ORDER — OXYTOCIN/0.9 % SODIUM CHLORIDE 30/500 ML
100-340 PLASTIC BAG, INJECTION (ML) INTRAVENOUS CONTINUOUS PRN
Status: DISCONTINUED | OUTPATIENT
Start: 2024-03-19 | End: 2024-03-20

## 2024-03-19 RX ORDER — TRANEXAMIC ACID 10 MG/ML
1 INJECTION, SOLUTION INTRAVENOUS EVERY 30 MIN PRN
Status: DISCONTINUED | OUTPATIENT
Start: 2024-03-19 | End: 2024-03-19 | Stop reason: HOSPADM

## 2024-03-19 RX ORDER — LOPERAMIDE HCL 2 MG
4 CAPSULE ORAL
Status: DISCONTINUED | OUTPATIENT
Start: 2024-03-19 | End: 2024-03-19 | Stop reason: HOSPADM

## 2024-03-19 RX ORDER — SODIUM CHLORIDE, SODIUM LACTATE, POTASSIUM CHLORIDE, CALCIUM CHLORIDE 600; 310; 30; 20 MG/100ML; MG/100ML; MG/100ML; MG/100ML
10-125 INJECTION, SOLUTION INTRAVENOUS CONTINUOUS
Status: DISCONTINUED | OUTPATIENT
Start: 2024-03-19 | End: 2024-03-20 | Stop reason: HOSPADM

## 2024-03-19 RX ORDER — DOCUSATE SODIUM 100 MG/1
100 CAPSULE, LIQUID FILLED ORAL DAILY
Status: DISCONTINUED | OUTPATIENT
Start: 2024-03-19 | End: 2024-03-20 | Stop reason: HOSPADM

## 2024-03-19 RX ORDER — CARBOPROST TROMETHAMINE 250 UG/ML
250 INJECTION, SOLUTION INTRAMUSCULAR
Status: DISCONTINUED | OUTPATIENT
Start: 2024-03-19 | End: 2024-03-19 | Stop reason: HOSPADM

## 2024-03-19 RX ORDER — ONDANSETRON 2 MG/ML
4 INJECTION INTRAMUSCULAR; INTRAVENOUS EVERY 6 HOURS PRN
Status: DISCONTINUED | OUTPATIENT
Start: 2024-03-19 | End: 2024-03-19 | Stop reason: HOSPADM

## 2024-03-19 RX ORDER — TERBUTALINE SULFATE 1 MG/ML
0.25 INJECTION, SOLUTION SUBCUTANEOUS
Status: DISCONTINUED | OUTPATIENT
Start: 2024-03-19 | End: 2024-03-19 | Stop reason: HOSPADM

## 2024-03-19 RX ORDER — MODIFIED LANOLIN
OINTMENT (GRAM) TOPICAL
Status: DISCONTINUED | OUTPATIENT
Start: 2024-03-19 | End: 2024-03-20 | Stop reason: HOSPADM

## 2024-03-19 RX ORDER — METOCLOPRAMIDE HYDROCHLORIDE 5 MG/ML
10 INJECTION INTRAMUSCULAR; INTRAVENOUS EVERY 6 HOURS PRN
Status: DISCONTINUED | OUTPATIENT
Start: 2024-03-19 | End: 2024-03-19 | Stop reason: HOSPADM

## 2024-03-19 RX ORDER — LIDOCAINE 40 MG/G
CREAM TOPICAL
Status: DISCONTINUED | OUTPATIENT
Start: 2024-03-19 | End: 2024-03-19 | Stop reason: HOSPADM

## 2024-03-19 RX ORDER — LIDOCAINE HYDROCHLORIDE 10 MG/ML
INJECTION, SOLUTION EPIDURAL; INFILTRATION; INTRACAUDAL; PERINEURAL PRN
Status: DISCONTINUED | OUTPATIENT
Start: 2024-03-19 | End: 2024-03-19

## 2024-03-19 RX ORDER — KETOROLAC TROMETHAMINE 30 MG/ML
30 INJECTION, SOLUTION INTRAMUSCULAR; INTRAVENOUS
Status: DISCONTINUED | OUTPATIENT
Start: 2024-03-19 | End: 2024-03-20

## 2024-03-19 RX ORDER — SODIUM CHLORIDE, SODIUM LACTATE, POTASSIUM CHLORIDE, CALCIUM CHLORIDE 600; 310; 30; 20 MG/100ML; MG/100ML; MG/100ML; MG/100ML
INJECTION, SOLUTION INTRAVENOUS CONTINUOUS PRN
Status: DISCONTINUED | OUTPATIENT
Start: 2024-03-19 | End: 2024-03-19 | Stop reason: HOSPADM

## 2024-03-19 RX ORDER — BISACODYL 10 MG
10 SUPPOSITORY, RECTAL RECTAL DAILY PRN
Status: DISCONTINUED | OUTPATIENT
Start: 2024-03-19 | End: 2024-03-20 | Stop reason: HOSPADM

## 2024-03-19 RX ORDER — OXYTOCIN/0.9 % SODIUM CHLORIDE 30/500 ML
340 PLASTIC BAG, INJECTION (ML) INTRAVENOUS CONTINUOUS PRN
Status: DISCONTINUED | OUTPATIENT
Start: 2024-03-19 | End: 2024-03-20 | Stop reason: HOSPADM

## 2024-03-19 RX ORDER — LIDOCAINE HYDROCHLORIDE AND EPINEPHRINE 15; 5 MG/ML; UG/ML
3 INJECTION, SOLUTION EPIDURAL
Status: DISCONTINUED | OUTPATIENT
Start: 2024-03-19 | End: 2024-03-19 | Stop reason: HOSPADM

## 2024-03-19 RX ORDER — HYDROCORTISONE 25 MG/G
CREAM TOPICAL 3 TIMES DAILY PRN
Status: DISCONTINUED | OUTPATIENT
Start: 2024-03-19 | End: 2024-03-20 | Stop reason: HOSPADM

## 2024-03-19 RX ORDER — METHYLERGONOVINE MALEATE 0.2 MG/ML
200 INJECTION INTRAVENOUS
Status: DISCONTINUED | OUTPATIENT
Start: 2024-03-19 | End: 2024-03-19 | Stop reason: HOSPADM

## 2024-03-19 RX ORDER — MISOPROSTOL 100 UG/1
400 TABLET ORAL
Status: DISCONTINUED | OUTPATIENT
Start: 2024-03-19 | End: 2024-03-19 | Stop reason: HOSPADM

## 2024-03-19 RX ORDER — OXYTOCIN 10 [USP'U]/ML
10 INJECTION, SOLUTION INTRAMUSCULAR; INTRAVENOUS
Status: DISCONTINUED | OUTPATIENT
Start: 2024-03-19 | End: 2024-03-20 | Stop reason: HOSPADM

## 2024-03-19 RX ORDER — NALOXONE HYDROCHLORIDE 0.4 MG/ML
0.2 INJECTION, SOLUTION INTRAMUSCULAR; INTRAVENOUS; SUBCUTANEOUS
Status: DISCONTINUED | OUTPATIENT
Start: 2024-03-19 | End: 2024-03-19 | Stop reason: HOSPADM

## 2024-03-19 RX ORDER — HYDRALAZINE HYDROCHLORIDE 20 MG/ML
10 INJECTION INTRAMUSCULAR; INTRAVENOUS
Status: DISCONTINUED | OUTPATIENT
Start: 2024-03-19 | End: 2024-03-20 | Stop reason: HOSPADM

## 2024-03-19 RX ORDER — LABETALOL HYDROCHLORIDE 5 MG/ML
20-80 INJECTION, SOLUTION INTRAVENOUS EVERY 10 MIN PRN
Status: DISCONTINUED | OUTPATIENT
Start: 2024-03-19 | End: 2024-03-20 | Stop reason: HOSPADM

## 2024-03-19 RX ORDER — ACETAMINOPHEN 325 MG/1
650 TABLET ORAL EVERY 4 HOURS PRN
Status: DISCONTINUED | OUTPATIENT
Start: 2024-03-19 | End: 2024-03-20 | Stop reason: HOSPADM

## 2024-03-19 RX ORDER — LIDOCAINE HYDROCHLORIDE AND EPINEPHRINE 15; 5 MG/ML; UG/ML
INJECTION, SOLUTION EPIDURAL PRN
Status: DISCONTINUED | OUTPATIENT
Start: 2024-03-19 | End: 2024-03-19

## 2024-03-19 RX ORDER — LOPERAMIDE HCL 2 MG
2 CAPSULE ORAL
Status: DISCONTINUED | OUTPATIENT
Start: 2024-03-19 | End: 2024-03-20 | Stop reason: HOSPADM

## 2024-03-19 RX ORDER — OXYTOCIN 10 [USP'U]/ML
10 INJECTION, SOLUTION INTRAMUSCULAR; INTRAVENOUS
Status: DISCONTINUED | OUTPATIENT
Start: 2024-03-19 | End: 2024-03-20

## 2024-03-19 RX ORDER — FENTANYL CITRATE-0.9 % NACL/PF 10 MCG/ML
100 PLASTIC BAG, INJECTION (ML) INTRAVENOUS EVERY 5 MIN PRN
Status: DISCONTINUED | OUTPATIENT
Start: 2024-03-19 | End: 2024-03-19 | Stop reason: HOSPADM

## 2024-03-19 RX ORDER — TRANEXAMIC ACID 10 MG/ML
1 INJECTION, SOLUTION INTRAVENOUS EVERY 30 MIN PRN
Status: DISCONTINUED | OUTPATIENT
Start: 2024-03-19 | End: 2024-03-20 | Stop reason: HOSPADM

## 2024-03-19 RX ORDER — OXYTOCIN/0.9 % SODIUM CHLORIDE 30/500 ML
1-24 PLASTIC BAG, INJECTION (ML) INTRAVENOUS CONTINUOUS
Status: DISCONTINUED | OUTPATIENT
Start: 2024-03-19 | End: 2024-03-19 | Stop reason: HOSPADM

## 2024-03-19 RX ORDER — LOPERAMIDE HCL 2 MG
2 CAPSULE ORAL
Status: DISCONTINUED | OUTPATIENT
Start: 2024-03-19 | End: 2024-03-19 | Stop reason: HOSPADM

## 2024-03-19 RX ORDER — CARBOPROST TROMETHAMINE 250 UG/ML
250 INJECTION, SOLUTION INTRAMUSCULAR
Status: DISCONTINUED | OUTPATIENT
Start: 2024-03-19 | End: 2024-03-20 | Stop reason: HOSPADM

## 2024-03-19 RX ORDER — LIDOCAINE 40 MG/G
CREAM TOPICAL
Status: DISCONTINUED | OUTPATIENT
Start: 2024-03-19 | End: 2024-03-20 | Stop reason: HOSPADM

## 2024-03-19 RX ORDER — IBUPROFEN 400 MG/1
800 TABLET, FILM COATED ORAL EVERY 6 HOURS PRN
Status: DISCONTINUED | OUTPATIENT
Start: 2024-03-19 | End: 2024-03-20 | Stop reason: HOSPADM

## 2024-03-19 RX ORDER — PROCHLORPERAZINE 25 MG
25 SUPPOSITORY, RECTAL RECTAL EVERY 12 HOURS PRN
Status: DISCONTINUED | OUTPATIENT
Start: 2024-03-19 | End: 2024-03-19 | Stop reason: HOSPADM

## 2024-03-19 RX ORDER — BUPIVACAINE HYDROCHLORIDE 2.5 MG/ML
10 INJECTION, SOLUTION EPIDURAL; INFILTRATION; INTRACAUDAL ONCE
Status: COMPLETED | OUTPATIENT
Start: 2024-03-19 | End: 2024-03-19

## 2024-03-19 RX ADMIN — LIDOCAINE HYDROCHLORIDE AND EPINEPHRINE 3 ML: 15; 5 INJECTION, SOLUTION EPIDURAL at 13:16

## 2024-03-19 RX ADMIN — SODIUM CHLORIDE, POTASSIUM CHLORIDE, SODIUM LACTATE AND CALCIUM CHLORIDE: 600; 310; 30; 20 INJECTION, SOLUTION INTRAVENOUS at 11:37

## 2024-03-19 RX ADMIN — Medication 2 MILLI-UNITS/MIN: at 06:22

## 2024-03-19 RX ADMIN — Medication 10 ML/HR: at 13:28

## 2024-03-19 RX ADMIN — Medication 8 MILLI-UNITS/MIN: at 09:40

## 2024-03-19 RX ADMIN — SODIUM CHLORIDE, POTASSIUM CHLORIDE, SODIUM LACTATE AND CALCIUM CHLORIDE: 600; 310; 30; 20 INJECTION, SOLUTION INTRAVENOUS at 06:20

## 2024-03-19 RX ADMIN — Medication 2000 MG: at 06:23

## 2024-03-19 RX ADMIN — BUPIVACAINE HYDROCHLORIDE 10 ML: 2.5 INJECTION, SOLUTION EPIDURAL; INFILTRATION; INTRACAUDAL; PERINEURAL at 13:24

## 2024-03-19 RX ADMIN — LIDOCAINE HYDROCHLORIDE 5 ML: 10 INJECTION, SOLUTION EPIDURAL; INFILTRATION; INTRACAUDAL; PERINEURAL at 13:11

## 2024-03-19 ASSESSMENT — ACTIVITIES OF DAILY LIVING (ADL)
ADLS_ACUITY_SCORE: 18
WEAR_GLASSES_OR_BLIND: NO
DRESSING/BATHING_DIFFICULTY: NO
ADLS_ACUITY_SCORE: 18
DIFFICULTY_COMMUNICATING: NO
ADLS_ACUITY_SCORE: 18
TOILETING_ISSUES: NO
ADLS_ACUITY_SCORE: 18
CHANGE_IN_FUNCTIONAL_STATUS_SINCE_ONSET_OF_CURRENT_ILLNESS/INJURY: NO
FALL_HISTORY_WITHIN_LAST_SIX_MONTHS: NO
WALKING_OR_CLIMBING_STAIRS_DIFFICULTY: NO
CONCENTRATING,_REMEMBERING_OR_MAKING_DECISIONS_DIFFICULTY: NO
ADLS_ACUITY_SCORE: 18
ADLS_ACUITY_SCORE: 18
HEARING_DIFFICULTY_OR_DEAF: NO
ADLS_ACUITY_SCORE: 18
DIFFICULTY_EATING/SWALLOWING: NO
DOING_ERRANDS_INDEPENDENTLY_DIFFICULTY: NO

## 2024-03-19 NOTE — ANESTHESIA PROCEDURE NOTES
"Epidural catheter Procedure Note    Pre-Procedure   Staff -        CRNA: Ovi Rosenberg APRN CRNA       Performed By: CRNA       Location: OB       Procedure Start/Stop Times: 3/19/2024 1:07 PM and 3/19/2024 1:24 PM       Pre-Anesthestic Checklist: patient identified, IV checked, risks and benefits discussed, informed consent, monitors and equipment checked, pre-op evaluation, at physician/surgeon's request and post-op pain management  Timeout:       Correct Patient: Yes        Correct Procedure: Yes        Correct Site: Yes        Correct Position: Yes   Procedure Documentation  Procedure: epidural catheter       Diagnosis: Labor Epidural       Patient Position: sitting       Patient Prep/Sterile Barriers: sterile gloves, mask, patient draped       Skin prep: Chloraprep       Local skin infiltrated with 5 mL of 1% lidocaine.        Insertion Site: L3-4. (midline approach).       Technique: LORT air        KATHERYN at 6 cm.       Needle Type: To[employer]"y needle       Needle Gauge: 17.        Catheter: 19 G.          Catheter threaded easily.         6 cm epidural space.         Threaded 12 cm at skin.         # of attempts: 1 and  # of redirects:  1    Assessment/Narrative         Paresthesias: No.       Test dose of 3 mL lidocaine 1.5% w/ 1:200,000 epinephrine at 13:16 CDT.         Test dose negative, 3 minutes after injection, for signs of intravascular, subdural, or intrathecal injection.       Insertion/Infusion Method: LORT air       Aspiration negative for Heme or CSF via Epidural Catheter.    Medication(s) Administered   Medication Administration Time: 3/19/2024 1:07 PM     Comments:  Pt tolerated procedure well.  No complications encountered.       FOR [facility] ([facility]) ONLY:   Pain Team Contact information: please page the Pain Team Via [employer]. Search \"Pain\". During daytime hours, please page the attending first. At night please page the resident first.      "

## 2024-03-19 NOTE — PLAN OF CARE
Labor Shift Note  Data: See Flowsheets activity for contraction and fetal documentation.   Vitals:    24 0730 24 1015 24 1315 24 1345   BP: 128/79 123/77 135/87 110/52   BP Location: Left arm Left arm     Patient Position: Semi-Werner's Semi-Werner's     Cuff Size: Adult Large Adult Large     Pulse: 100      Resp: 18      Temp: 97.3  F (36.3  C)      TempSrc: Temporal      SpO2: 100%      . Signs and symptoms of infection Present and Absent.    Complications in labor: Present and Absent. Support person is present.  Interventions: Continue uterine/fetal assessment per orders and nursing discretion. Vital Signs per order set. Comfort measures/pain control/labor management: Ambulation, hydration, position changes, birthing ball/sling and tub options to facilitate labor.  Labor induction with Pitocin risks and benefits reviewed with pt. Agreeable to plan.  Plan: Anticipate . Provide labor/coping assistance as needed by patient and support person.  Observe for and notify care provider of indications of progressing labor, need for pain medications, or signs of fetal/maternal compromise.     Goal Outcome Evaluation:    Outcome Evaluation: Pt is calm and comfortable. Pt stated her pain management plan is to get an epidural when pain gets to be too much    Deisy Junior RN on 3/19/2024 at 2:16 PM

## 2024-03-19 NOTE — PROGRESS NOTES
Kizzy Chan is doing well. Vitals are stable: /61   Pulse 100   Temp 97.3  F (36.3  C)   Resp 18   LMP 2023   SpO2 100%   Breastfeeding Unknown     FF, midline and 1 below. Bleeding is light with no clots noted. Pain has been well managed with oral analgesics. Voiding without difficulty. Independent in room. Tolerating a Regular diet and oral rehydration. IV is Saline Locked. breastfeeding independently and breastfeeding and supplementing with formula. Bonding well with . Patient has verbalized understanding of routine cares and warning signs.    Deisy Junior RN on 3/19/2024 at 6:15 PM

## 2024-03-19 NOTE — PROGRESS NOTES
Patient arrives with her , Lc for her scheduled induction    Yuriy Ellis RN on 3/19/2024 at 5:33 AM

## 2024-03-19 NOTE — PROGRESS NOTES
Intrapartum Progress Note    S: Patient is still comfortable.     O: /77 (BP Location: Left arm, Patient Position: Semi-Werner's, Cuff Size: Adult Large)   Pulse 100   Temp 97.3  F (36.3  C) (Temporal)   Resp 18   LMP 2023   SpO2 100%    Gen: resting in bed, NAD  Cvx: 3/60/-2    FHT: 150, mod variability, + accels, no decels  [place]: 3 contractions in 10 min    Membranes: AROM- clear    A/P: 33 year old  at 37w1d by 9w1d US admitted for IOL for chronic HTN  cHTN  - BPs normal since admission  - normal HELLP labs upon admission    FWB: cat I, reactive. EFW 2800g.   Labor: pitocin per protocol, now s/p AROM  Pain: per patient request  GBS: positive, vanco adequate  Rh: positive  Rubella: immune  Continue routine labor management.   Anticipate     Mayi Benavidez MD 11:47 AM

## 2024-03-19 NOTE — H&P
[facility] Labor and Delivery History and Physical    Tabatha Chan MRN# [id number]   Age: 33 year old YOB: 1990     Date of Admission:  3/19/2024    Primary care provider: Lenroa Osborne           Chief Complaint:   Tabatha Chan is a 33 year old  at 37w1d by 9w1d US admitted for induction of labor, indication chronic HTN. Denies PreE symptoms.           Pregnancy history:     OBSTETRIC HISTORY:    OB History    Para Term  AB Living   6 2 2 0 3 2   SAB IAB Ectopic Multiple Live Births   3 0 0 0 2      # Outcome Date GA Lbr Lorenzo/2nd Weight Sex Delivery Anes PTL Lv   6 Current            5 SAB 22     SAB      4 Term 21 37w6d 02:54 / 00:09 2.89 kg (6 lb 5.9 oz) F Vag-Spont EPI N BARNEY      Complications: Anesthetic Complications      Name: WILMA LOWE-TABATHA      Apgar1: 7  Apgar5: 9   3 Term 20 37w1d 03:41 / 01:47 2.58 kg (5 lb 11 oz) M Vag-Spont EPI N BARNEY      Complications: Preeclampsia/Hypertension      Name: JORGITO LOWE      Apgar1: 8  Apgar5: 9   2 SAB 19           1 SAB 19               EDC: Estimated Date of Delivery: 2024    Prenatal Labs:   Lab Results   Component Value Date    AS Negative 2024    HEPBANG Nonreactive 2023    GCPCRT Negative 2019    HGB 11.6 (L) 2024       GBS Status: Positive, receiving vancomycin 1,750 mg IV q 12 hours       Active Problem List  Patient Active Problem List   Diagnosis    Generalized anxiety disorder    Irritable bowel syndrome with diarrhea    Fear of flying    Disorder of refraction and accommodation    Myopia    Other acne    White coat syndrome without hypertension    History of miscarriage    Gestational (pregnancy-induced) hypertension without significant proteinuria, complicating childbirth    Anxiety state    Failed moderate sedation during procedure    History of gestational hypertension     Recurrent pregnancy loss    Family history of breast cancer in mother    Encounter for supervision of other normal pregnancy in third trimester       Medication Prior to Admission  Medications Prior to Admission   Medication Sig Dispense Refill Last Dose    aspirin (ASA) 81 MG chewable tablet Take 81 mg by mouth daily       azelaic acid (FINACIA) 15 % external gel Apply topically dailiy. 50 g 11     famotidine (PEPCID) 20 MG tablet Take 20 mg by mouth 2 times daily as needed       FLUoxetine (PROZAC) 20 MG capsule TAKE 1 CAPSULE BY MOUTH DAILY 90 capsule 1     Misc. Devices (BREAST PUMP) MISC 1 each as needed (daily) 1 each 0     Prenatal Vit-Fe Fumarate-FA (PRENATAL MULTIVITAMIN W/IRON) 27-0.8 MG tablet Take 1 tablet by mouth daily       Vitamin D3 (CHOLECALCIFEROL) 25 mcg (1000 units) tablet       .        Maternal Past Medical History:     Past Medical History:   Diagnosis Date    Anxiety     Asthma     exercise induced as child    Carrier of group B Streptococcus     Complication of anesthesia     Family history of malignant neoplasm of breast     mom and grandma; q6 month alternating mammo and MRI after done nursing    Hypertension     Gestational HTN     Past Surgical History:   Procedure Laterality Date    CLOSED REDUCTION DISTAL RADIUS FRACTURE  03/26/2005    COLONOSCOPY  01/09/2017                       Family History:   I have reviewed this patient's family history  Family History   Problem Relation Age of Onset    Other - See Comments Mother         Ophthalmic Disease    Breast Cancer Mother 54    Hypertension Mother     Hyperlipidemia Father     No Known Problems Sister     Anxiety Disorder Brother     No Known Problems Brother     Other - See Comments Maternal Grandmother         Ophthalmic Disease    Breast Cancer Maternal Grandmother         77    Other - See Comments Maternal Grandfather         Ophthalmic Disease    Hyperlipidemia Maternal Grandfather     Hypertension Maternal Grandfather     GI  problems Maternal Grandfather     Heart Disease Maternal Grandfather     Parkinsonism Paternal Grandmother     Cancer Paternal Grandfather     Cerebrovascular Disease Paternal Grandfather     No Known Problems Daughter     No Known Problems Son     Celiac Disease Paternal Aunt     Celiac Disease Cousin         paternal     Family history reviewed            Social History:     Social History     Tobacco Use    Smoking status: Never    Smokeless tobacco: Never   Substance Use Topics    Alcohol use: Not Currently     Alcohol/week: 2.0 standard drinks of alcohol     Types: 2 Glasses of wine per week     Comment: 2 glass wine a week            Review of Systems:   CONSTITUTIONAL: NEGATIVE for fever  EYES: NEGATIVE for vision changes   ENT/MOUTH: NEGATIVE for ear, mouth and throat problems  RESP: NEGATIVE for SOB  CV: NEGATIVE for chest pain or peripheral edema  GI: NEGATIVE for nausea, abdominal pain, or change in bowel habits  : NEGATIVE for frequency, dysuria, or hematuria  NEURO: NEGATIVE for headaches         Physical Exam:   Patient Vitals for the past 8 hrs:   BP Temp Temp src Pulse Resp SpO2   24 0730 128/79 97.3  F (36.3  C) Temporal 100 18 100 %   24 0550 121/73 97  F (36.1  C) Temporal (!) 142 20 98 %     Gen: well-appearing, NAD  CV: RRR, no m/r/g  Resp: clear and equal bilaterally, no wheezes, rales or rhonchi  Abd: soft, non-tender; non-tender fundus noted to be above umbilicus, bowel sounds present       Cervix: 3/70/-2  Membranes: intact  EFW: 2800 g  Presentation:Cephalic    Fetal Heart Rate Tracing: reactive and reassuring  Tocometer: external monitor and variable frequency        Assessment:   Kizzy Chan is a 33 year old  at 37w1d admitted with induction of labor, indication chronic HTN    Chronic HTN  - BPs normal since admission  - normal HELLP labs upon admission       GERD: pepcid  Plans breastfeeding, epidural, Mirena     PNC: Rh positive, RI,   Genetics:  normal aneuploidy screening  Imaging: dating US at 9w1d, anatomy survey normal  Immunizations: s/p COVID series, s/p flu shot at work. Tdap 2024. RSV 2024         Plan:   Labor induction with Pitocin  Prophylactic antibiotic for + GBS status  Monitor BP   Anticipate     YADY MORSE-POST, MS3      I agree with the above documentation, any indicated changes have been made by me. I personally saw and examined the patient on this date. IOL for chronic HTN with escalating BPs. On pitocin per protocol, will AROM once vanco is adequate.    Mayi Benavidez MD FACOG  OB/GYN  3/19/2024 11:06 AM

## 2024-03-19 NOTE — PHARMACY-VANCOMYCIN DOSING SERVICE
Pharmacy Vancomycin Note  Date of Service 2024  Patient's  1990   33 year old, female    Indication:  Group B Strep  Day of Therapy: 1   Current vancomycin regimen:  2000 mg IV q8h  Current vancomycin monitoring method: Trough (Method 1 = dosing nomogram)  Current vancomycin therapeutic monitoring goal: 10-15 mg/L    [employer]ight[employer] Prediction of Current Vancomycin Regimen  N/A, Patient is pregnant     Current estimated CrCl = Estimated Creatinine Clearance: 146.5 mL/min (based on SCr of 0.74 mg/dL).    Creatinine for last 3 days  3/19/2024:  5:39 AM Creatinine 0.74 mg/dL    Recent Vancomycin Levels (past 3 days)  No results found for requested labs within last 3 days.    Vancomycin IV Administrations (past 72 hours)                     vancomycin (VANCOCIN) 2,000 mg in 0.9% NaCl 500 mL intermittent infusion (mg) 2,000 mg New Bag 24 0623                    Nephrotoxins and other renal medications (From now, onward)      Start     Dose/Rate Route Frequency Ordered Stop    24 1800  vancomycin (VANCOCIN) 1,750 mg in 0.9% NaCl 500 mL intermittent infusion         1,750 mg  over 2 Hours Intravenous EVERY 12 HOURS 24 0750      24 0533  ketorolac (TORADOL) injection 30 mg        See Hyperspace for full Linked Orders Report.    30 mg Intravenous ONCE PRN 24 0533 24 0532    24 0533  ketorolac (TORADOL) injection 30 mg        See Hyperspace for full Linked Orders Report.    30 mg Intramuscular ONCE PRN 24 0533 24 0532    24 0533  ibuprofen (ADVIL/MOTRIN) tablet 800 mg        See Hyperspace for full Linked Orders Report.    800 mg Oral ONCE PRN 24 0533 24 0532               Contrast Orders - past 72 hours (72h ago, onward)      None            Interpretation of levels and current regimen:  Vancomycin level is reflective of  Second dose;     Has serum creatinine changed greater than 50% in last 72 hours: No    Urine output:  good urine  output    Renal Function: Stable    Insight[employer] Prediction of Current Vancomycin Regimen  N/A, Patient is pregnant     Plan:  Decrease Dose to 1750 mg Q12H  Vancomycin monitoring method: Trough (Method 1 = dosing nomogram)  Vancomycin therapeutic monitoring goal: 10-15 mg/L  Pharmacy will check vancomycin levels as appropriate in 1-3 Days.  Serum creatinine levels will be ordered daily for the first week of therapy and at least twice weekly for subsequent weeks.    Calvin Canalse, CAMERONH

## 2024-03-19 NOTE — ANESTHESIA PREPROCEDURE EVALUATION
Anesthesia Pre-Procedure Evaluation    Patient: Kizzy Chan   MRN: [id number] : 1990        Procedure :           Past Medical History:   Diagnosis Date    Anxiety     Asthma     exercise induced as child    Carrier of group B Streptococcus     Complication of anesthesia     Family history of malignant neoplasm of breast     mom and grandma; q6 month alternating mammo and MRI after done nursing    Hypertension     Gestational HTN      Past Surgical History:   Procedure Laterality Date    CLOSED REDUCTION DISTAL RADIUS FRACTURE  2005    COLONOSCOPY  2017      Allergies   Allergen Reactions    Ceftriaxone Rash     Had a rash possibly from this injection     Nickel Rash    Penicillins Rash      Social History     Tobacco Use    Smoking status: Never    Smokeless tobacco: Never   Substance Use Topics    Alcohol use: Not Currently     Alcohol/week: 2.0 standard drinks of alcohol     Types: 2 Glasses of wine per week     Comment: 2 glass wine a week      Wt Readings from Last 1 Encounters:   24 115.2 kg (254 lb)        Anesthesia Evaluation   Pt has had prior anesthetic.     No history of anesthetic complications       ROS/MED HX  ENT/Pulmonary:     (+)                     Intermittent, asthma                  Neurologic:  - neg neurologic ROS     Cardiovascular:     (+)  hypertension- - PIH  -  - -                                      METS/Exercise Tolerance: >4 METS    Hematologic:  - neg hematologic  ROS     Musculoskeletal:  - neg musculoskeletal ROS     GI/Hepatic:     (+) GERD,      Inflammatory bowel disease,             Renal/Genitourinary:  - neg Renal ROS     Endo:  - neg endo ROS     Psychiatric/Substance Use:     (+) psychiatric history anxiety       Infectious Disease:  - neg infectious disease ROS     Malignancy:  - neg malignancy ROS     Other:  - neg other ROS          Physical Exam    Airway        Mallampati: I   TM distance: > 3 FB   Neck ROM: full   Mouth  opening: > 3 cm    Respiratory Devices and Support         Dental  no notable dental history         Cardiovascular   cardiovascular exam normal          Pulmonary   pulmonary exam normal                OUTSIDE LABS:  CBC:   Lab Results   Component Value Date    WBC 12.0 (H) 03/19/2024    WBC 9.3 03/12/2024    HGB 11.6 (L) 03/19/2024    HGB 11.1 (L) 03/12/2024    HCT 34.1 (L) 03/19/2024    HCT 32.8 (L) 03/12/2024     03/19/2024     03/12/2024     BMP:   Lab Results   Component Value Date     03/19/2024     03/12/2024    POTASSIUM 3.9 03/19/2024    POTASSIUM 4.1 03/12/2024    CHLORIDE 105 03/19/2024    CHLORIDE 106 03/12/2024    CO2 18 (L) 03/19/2024    CO2 18 (L) 03/12/2024    BUN 9.3 03/19/2024    BUN 9.9 03/12/2024    CR 0.74 03/19/2024    CR 0.64 03/12/2024     (H) 03/19/2024    GLC 88 03/12/2024     COAGS:   Lab Results   Component Value Date    PTT 29 05/17/2021    INR 0.97 05/17/2021     POC:   Lab Results   Component Value Date    HCG Positive (A) 07/22/2019     HEPATIC:   Lab Results   Component Value Date    ALBUMIN 3.4 (L) 03/19/2024    PROTTOTAL 6.5 03/19/2024    ALT 34 03/19/2024    AST 31 03/19/2024    ALKPHOS 153 (H) 03/19/2024    BILITOTAL 0.3 03/19/2024     OTHER:   Lab Results   Component Value Date    A1C 5.3 07/25/2023    DEIRDRE 9.3 03/19/2024    TSH 1.62 01/17/2023       Anesthesia Plan    ASA Status:  2       Anesthesia Type: Epidural.              Consents    Anesthesia Plan(s) and associated risks, benefits, and realistic alternatives discussed. Questions answered and patient/representative(s) expressed understanding.     - Discussed:     - Discussed with:  Patient            Postoperative Care            Comments:           neg OB ROS.      NEVIN River CRNA    I have reviewed the pertinent notes and labs in the chart from the past 30 days and (re)examined the patient.  Any updates or changes from those notes are reflected in this note.          #  Hypoalbuminemia: Lowest albumin = 3.4 g/dL at 3/19/2024  5:39 AM, will monitor as appropriate    # Drug Induced Platelet Defect: home medication list includes an antiplatelet medication

## 2024-03-19 NOTE — L&D DELIVERY NOTE
OB Vaginal Delivery Note    Kizzy Chan MRN# [id number]   Age: 33 year old YOB: 1990       GA: 37w1d  GP:   Labor Complications: None   EBL:   mL  Delivery QBL: 267 mL  Delivery Type: Vaginal, Spontaneous   ROM to Delivery Time: (Delivered) Hours: 3 Minutes: 40   Weight: 3.022 kg (6 lb 10.6 oz)    1 Minute 5 Minute 10 Minute   Apgar Totals: 8   9        ONEAL BUTLER;INES ESPINOZA;REGINO MISHRA;LAYTON HURD     Delivery Details:  Kizzy Chan, a 33 year old  who was admitted at 37w1d for IOL for chronic HTN. She was started on pitocin. She underwent AROM at 3cm and then received an epidural for pain relief. She progressed slowly to 4.5 cm, then progressed to complete dilation in less than 30 minutes. She pushed effectively for 4 contractions and delivered a vigorous male infant, NOAH position, without complication. Apgars 8/9. Weight 3022g. Placenta delivered spontaneously and appeared intact. Small first degree laceration was bleeding, repaired with a figure of X of 3-0 vicryl. .    Mayi Benavidez MD FACOG  OB/GYN  3/19/2024 4:36 PM         Jane Munoz [[id number]]      Labor Event Times      Active labor onset date: 3/19/24 Onset time:  2:53 PM   Dilation complete date: 3/19/24 Complete time:  3:17 PM   Start pushing date/time: 3/19/2024 1519          Labor Length      1st Stage (hrs): 0 (min): 24   2nd Stage (hrs): 0 (min): 7   3rd Stage (hrs): 0 (min): 3          Labor Events     labor?: No   steroids: None  Labor Type: Induction/Cervical ripening  Predominate monitoring during 1st stage: continuous electronic fetal monitoring     Antibiotics received during labor?: Yes  Reason for Antibiotics: GBS  Antibiotics received for GBS: Vancomycin  Antibiotics Given (GBS): Greater than 4 hours prior to delivery       Rupture date/time: 3/19/24 1144   Rupture type: Artificial Rupture of Membranes  Fluid  color: Clear  Fluid odor: Normal     Induction: Mechanical ripening agent, Oxytocin  Mechanical ripening occurred: In hospital  Induction date/time: 3/19/24 0620    Cervical ripening date/time:      Indications for induction: Chronic Hypertension       Delivery/Placenta Date and Time      Delivery Date: 3/19/24 Delivery Time:  3:24 PM   Placenta Date/Time: 3/19/2024  3:27 PM  Oxytocin given at the time of delivery: after delivery of baby  Delivering clinician: Mayi Benavidez MD   Other personnel present at delivery:  Provider Role   Deisy Junior, RN Registered Nurse   Autumn Blake, RN Registered Nurse   Jennifer Bailon, RN Registered Nurse   Sharmila Hobson, RT Respiratory Therapist             Vaginal Counts       Initial count performed by 2 team members:  Two Team Members   Autumn Blake Julie Needles Suture Needles Sponges (RETIRED) Instruments   Initial counts 1  6    Added to count  1     Relief counts       Final counts 1 1 6            Placed during labor Accounted for at the end of labor   FSE No NA   IUPC Yes Yes   Cervidil No NA                  Final count performed by 2 team members:  Two Team Members   Autumn Blake      Final count correct?: Yes       Apgars    Living status: Living   1 Minute 5 Minute 10 Minute 15 Minute 20 Minute   Skin color: 0  1       Heart rate: 2  2       Reflex irritability: 2  2       Muscle tone: 2  2       Respiratory effort: 2  2       Total: 8  9       Apgars assigned by: AUTUMN BLAKE       Cord      Vessels: 3 Vessels    Cord Complications: Nuchal   Nuchal Intervention: delivered through         Nuchal cord description: loose nuchal cord         Cord Blood Disposition: Lab    Gases Sent?: No    Delayed cord clamping?: Yes    Cord Clamping Delay (seconds): 31-60 seconds    Stem cell collection?: No           [place] Resuscitation    Methods: None  [place] Care at Delivery:  of living male, lusty cry, lungs  "clearing, to mom's chest, skin to skin for bonding. Apgars 8/9. Mom and dad happy with baby boy.   Output in Delivery Room: Voided        Measurements      Weight: 6 lb 10.6 oz Length: 1' 8.75\"     Head circumference: 33 cm Chest circumference: 33.7 cm   Output in delivery room: Voided       Skin to Skin and Feeding Plan      Skin to skin initiation date/time: 1/3/1841    Skin to skin with: Mother  Skin to skin end date/time:     Breastfeeding initiated date/time: 3/19/2024 1600       Labor Events and Shoulder Dystocia    Fetal Tracing Prior to Delivery: Category 1  Shoulder dystocia present?: Neg       Delivery (Maternal) (Provider to Complete) ([id number])    Episiotomy: None  Perineal lacerations: 1st Repaired?: Yes   Repair suture: 3-0 Vicryl  Genital tract inspection done: Pos       Blood Loss  Mother: Kizzy Munoz #[id number]     Start of Mother's Information      Delivery Blood Loss  24 1453 - 24 1634      Delivery QBL (mL) Hospital Encounter 267 mL    Total  267 mL               End of Mother's Information  Mother: Kizzy Munoz #[id number]                Delivery - Provider to Complete ([id number])    Delivering clinician: Mayi Benavidez MD  Delivery Type (Choose the 1 that will go to the Birth History): Vaginal, Spontaneous                         Other personnel:  Provider Role   Deisy Junior RN Registered Nurse   Autumn Blake, RN Registered Nurse   Justin Bailon., RN Registered Nurse   Sharmila Hobson, RT Respiratory Therapist                    Placenta    Date/Time: 3/19/2024  3:27 PM  Removal: Spontaneous  Disposition: Hospital disposal             Anesthesia    Method: Epidural  Cervical dilation at placement: 4-7                    Presentation and Position    Presentation: Vertex    Position: Left Occiput Anterior                     Mayi Benavidez MD    "

## 2024-03-19 NOTE — PROGRESS NOTES
Intrapartum Progress Note    S: Patient is feeling more pressure, otherwise comfortable with epidural.     O: /52   Pulse 100   Temp 97.3  F (36.3  C) (Temporal)   Resp 18   LMP 2023   SpO2 100%    Gen: resting in bed, NAD  Cvx: 4/90/-2    FHT: 140, mod variability, + accels, intermittent variable decels  [place]: 2-3 contractions in 10 min    Membranes: s/p AROM, clear. IUPC placed    A/P: 33 year old  at 37w1d by 9w1d US admitted for IOL for chronic HTN  cHTN  - BPs normal since admission  - normal HELLP labs upon admission     FWB: cat I, reactive. EFW 2800g.   Labor: pitocin per protocol, s/p AROM  Pain: epidural  GBS: positive, vanco adequate  Rh: positive  Rubella: immune  Continue routine labor management.   Anticipate     Mayi Benavidez MD 2:52 PM

## 2024-03-20 VITALS
TEMPERATURE: 97 F | DIASTOLIC BLOOD PRESSURE: 71 MMHG | BODY MASS INDEX: 36.8 KG/M2 | OXYGEN SATURATION: 97 % | WEIGHT: 249.2 LBS | RESPIRATION RATE: 16 BRPM | SYSTOLIC BLOOD PRESSURE: 128 MMHG | HEART RATE: 84 BPM

## 2024-03-20 PROBLEM — O10.919 CHRONIC HYPERTENSION AFFECTING PREGNANCY: Status: ACTIVE | Noted: 2024-03-20

## 2024-03-20 LAB — HGB BLD-MCNC: 10.1 G/DL (ref 11.7–15.7)

## 2024-03-20 PROCEDURE — 85018 HEMOGLOBIN: CPT | Performed by: OBSTETRICS & GYNECOLOGY

## 2024-03-20 PROCEDURE — 36415 COLL VENOUS BLD VENIPUNCTURE: CPT | Performed by: OBSTETRICS & GYNECOLOGY

## 2024-03-20 PROCEDURE — 250N000013 HC RX MED GY IP 250 OP 250 PS 637: Performed by: OBSTETRICS & GYNECOLOGY

## 2024-03-20 RX ADMIN — IBUPROFEN 800 MG: 400 TABLET, FILM COATED ORAL at 01:08

## 2024-03-20 RX ADMIN — DOCUSATE SODIUM 100 MG: 100 CAPSULE, LIQUID FILLED ORAL at 10:17

## 2024-03-20 ASSESSMENT — ACTIVITIES OF DAILY LIVING (ADL)
ADLS_ACUITY_SCORE: 18

## 2024-03-20 NOTE — DISCHARGE SUMMARY
VAGINAL DELIVERY DISCHARGE SUMMARY    Admit date: 3/19/2024  Discharge date: 3/20/2024     Admit Dx:   - 33 year old  at 37w1d    - chronic hypertension    Discharge Dx:  - Same as above, s/p     Procedures:  - Spontaneous vaginal delivery  - Epidural analgesia    Admit HPI:  Ms. Kizzy Chan is a   at 37w1d  who was admitted for IOL for chronic HTN on 3/19/2024. Please see her admit H&P for full details of her PMH, PSH, Meds, Allergies and exam on admit.    Hospital course:  Kizzy Chan was admitted to the hospital on 3/19/2024 for the above listed indications. She was started on pitocin. She underwent AROM at 3cm and then received an epidural for pain relief. She progressed slowly to 4.5 cm, then progressed to complete dilation in less than 30 minutes. She pushed effectively for 4 contractions and delivered a vigorous male infant, NOAH position, without complication. Apgars 8/9. Weight 3022g. Placenta delivered spontaneously and appeared intact. Small first degree laceration was bleeding, repaired with a figure of X of 3-0 vicryl. .  Please see her Delivery Summary for full details regarding her delivery.    Her postpartum course was complicated by nothing. Her BPs were normal throughout her stay. On PPD#1, she was meeting all of her postpartum goals and deemed stable for discharge. She was voiding without difficulty, tolerating a regular diet without nausea and vomiting, her pain was well controlled on oral pain medicines and her lochia was appropriate. Her hemoglobin prior to delivery was 11.6 and after delivery was 10.1. Her Rh status was positive, and Rhogam was not indicated.     Discharge Medications:  Current Discharge Medication List        CONTINUE these medications which have NOT CHANGED    Details   aspirin (ASA) 81 MG chewable tablet Take 81 mg by mouth daily      azelaic acid (FINACIA) 15 % external gel Apply topically dailiy.  Qty: 50 g, Refills: 11     Associated Diagnoses: Acne, unspecified acne type      famotidine (PEPCID) 20 MG tablet Take 20 mg by mouth 2 times daily as needed      FLUoxetine (PROZAC) 20 MG capsule TAKE 1 CAPSULE BY MOUTH DAILY  Qty: 90 capsule, Refills: 1    Comments: This prescription was filled on 4/25/2023. Any refills authorized will be placed on file.  Associated Diagnoses: Anxiety; Episode of recurrent major depressive disorder, unspecified depression episode severity (H24)      Misc. Devices (BREAST PUMP) MISC 1 each as needed (daily)  Qty: 1 each, Refills: 0    Comments: Milk Moms  Associated Diagnoses: Encounter for supervision of other normal pregnancy in third trimester      Prenatal Vit-Fe Fumarate-FA (PRENATAL MULTIVITAMIN W/IRON) 27-0.8 MG tablet Take 1 tablet by mouth daily      Vitamin D3 (CHOLECALCIFEROL) 25 mcg (1000 units) tablet              Discharge/Disposition:  Kizzy Chan was discharged to home in stable condition with the following instructions/medications:  1) Call for temperature > 100.4, bright red vaginal bleeding >1 pad an hour x 2 hours, foul smelling vaginal discharge, pain not controlled by usual oral pain meds, persistent nausea and vomiting not controlled on medications  2) She desired Mirena for contraception.  3) For feeding she decided to pumping/bottle feeding.  4) She was instructed to follow-up with Dr Benavidez in 6 weeks for a routine postpartum visit and IUD insertion, 3-5 day BP check.      Mayi Benavidez MD  OBGYN  3/20/2024 9:08 AM

## 2024-03-20 NOTE — PLAN OF CARE
Goal Outcome Evaluation: ongoing, progressing    Plan of Care Reviewed With: patient    Overall Patient Progress: improving    Outcome Evaluation: Pain is well controlled with PRN pain medications. Feeding  independently. Light bleeding. No clots      VSS. Patient is doing well. Fundus firm, midline, and 2 below umbilicus. Lochia light to moderate without clots. Voiding spontaneously without difficulty. Not passing flatus yet. Tolerating a regular diet. Caring for  independently. Formula feeding and also pumping. Adequate urine output. Missed a few voids. Adequate intake as well.    Temp: 97.5  F (36.4  C) Temp src: Temporal BP: 123/70 Pulse: 89   Resp: 18 SpO2: 97 % O2 Device: None (Room air)      Yuriy Ellis RN on 3/20/2024 at 5:49 AM

## 2024-03-20 NOTE — PROGRESS NOTES
Discharge Note      Data:  Kizzy Chan discharged to home at 1720 via ambulation. Accompanied by spouse and staff.    Action:  Written discharge/follow-up instructions were provided to patient. Prescriptions - None ordered for discharge. All belongings sent with patient.    Response:  Pt verbalized understanding of discharge instructions, reason for discharge, and necessary follow-up appointments. Without further questions or concerns at this time.      Deisy Junior RN on 3/20/2024 at 5:31 PM

## 2024-03-20 NOTE — PLAN OF CARE
Kizzy Chan is doing well. Vitals are stable: /71   Pulse 84   Temp 97  F (36.1  C)   Resp 16   LMP 2023   SpO2 97%   Breastfeeding Unknown     FF, midline and 2 below. Bleeding is light with no clots noted. Pain has been well managed with oral analgesics. Voiding without difficulty. Patient IS passing gas. Independent in room. Tolerating a Regular diet and oral rehydration. breastfeeding and supplementing with formula. Bonding well with . Patient has verbalized understanding of routine cares and warning signs.       Goal Outcome Evaluation:    Outcome Evaluation: Pt is pumping and giving baby boy formula. She is independent in her room. Pt's pain is controlled. Bleeding is light with no clots.

## 2024-03-20 NOTE — ANESTHESIA POSTPROCEDURE EVALUATION
Patient: Kizzy Chan    Procedure: * No procedures listed *       Anesthesia Type:  Epidural    Note:  Disposition: Inpatient   Postop Pain Control: Uneventful            Sign Out: Well controlled pain   PONV: No   Neuro/Psych: Uneventful            Sign Out: Acceptable/Baseline neuro status   Airway/Respiratory: Uneventful            Sign Out: Acceptable/Baseline resp. status   CV/Hemodynamics: Uneventful            Sign Out: Acceptable CV status; No obvious hypovolemia; No obvious fluid overload   Other NRE: NONE   DID A NON-ROUTINE EVENT OCCUR? No           Last vitals:  Vitals:    03/19/24 1629 03/19/24 1803 03/20/24 0110   BP: 131/61 138/65 123/70   Pulse:   89   Resp:  16 18   Temp:  97.5  F (36.4  C) 97.5  F (36.4  C)   SpO2:   97%       Electronically Signed By: NEVIN IBANEZ CRNA  March 20, 2024  8:49 AM

## 2024-03-20 NOTE — PROGRESS NOTES
OB/GYN Postpartum Note      S:  Patient is feeling well this morning.  Lochia = heavy menses.  Eating and drinking without nausea.  Ambulating without difficulty.  Pain is well controlled.  Pumping and bottle feeding    O:   Vitals:    24 1614 24 1629 24 1803 24 0110   BP: 139/67 131/61 138/65 123/70   BP Location:    Left arm   Patient Position:    Sitting   Cuff Size:    Adult Large   Pulse:    89   Resp:   16 18   Temp:   97.5  F (36.4  C) 97.5  F (36.4  C)   TempSrc:   Temporal Temporal   SpO2:    97%     General: resting in bed, in NAD  Resp: nonlabored  Abdomen: soft, nontender, nondistended  Fundus firm at umbilicus  Extremities: no edema in BLE    Hemoglobin   Date Value Ref Range Status   2024 10.1 (L) 11.7 - 15.7 g/dL Final   ]    A: Ms. Kizzy Chan is a 33 year old  PPD #1 s/p     P:  Chronic HTN:  - BPs normal since admission, asymptomatic    Heme 11.6 >  > 10.1, asymptomatic. Discussed iron rich foods  GI: tolerating regular diet  Feeding: pumping/bottle  Contraception: plans Mirena  Rubella Immune  Rh positive  Disposition: routine cares, discharge today with BP check in 3-5 days    Mayi Benavidez MD FACOG  OB/GYN  3/20/2024 9:07 AM

## 2024-03-25 ENCOUNTER — HOSPITAL ENCOUNTER (OUTPATIENT)
Dept: OBGYN | Facility: OTHER | Age: 34
Discharge: HOME | End: 2024-03-25
Attending: OBSTETRICS & GYNECOLOGY
Payer: COMMERCIAL

## 2024-03-25 ENCOUNTER — LACTATION ENCOUNTER (OUTPATIENT)
Dept: OBGYN | Facility: OTHER | Age: 34
End: 2024-03-25

## 2024-03-25 PROCEDURE — S9443 LACTATION CLASS: HCPCS | Performed by: REGISTERED NURSE

## 2024-03-25 NOTE — LACTATION NOTE
This note was copied from a baby's chart.  Outpatient Lactation Visit    Pancho Chan  [id number]    Consultation Date: 2024     Reason for Lactation Referral: Initial Lactation Consult    Baby's : 3/19/2024    Baby's Current Age: 6 day old  Baby's Gestational Age: Gestational Age: 37w1d    Primary Care Provider: Aminata Srivastava    Presenting Problem (concerns as stated by parent): no concerns - is pumping/bottle feeding breast milk    MATERNAL HISTORY   History of Breast Surgery: no  Breast Changes During Pregnancy: no  Breast Feeding History: pumped/bottle fed last child for a year  Maternal Meds: daily prenatal vitamin  Pregnancy Complications: chronic hypertension  Anesthesia during labor: epidural    MATERNAL ASSESSMENT    Breast Size: average, symmetrical, soft after feeding and filling prior to feeding  Nipple Appearance - Left: slightly cracked, with signs of healing, education on further healing techniques provided  Nipple Appearance - Right: slightly cracked, with signs of healing, education on further healing techniques provided  Nipple Erectility - Left: erect with stimulation  Nipple Erectility - Right: erect with stimulation  Areolas Compressibility: soft  Nipple Size: average  Special Equipment Used: breast pump  Day mother reports milk came in:  Day 3    INFANT ASSESSMENT    Oral Anatomy  Mouth: normal  Palate: normal  Jaw: normal  Tongue: normal  Frenulum: normal   Digital Suck Exam: root    FEEDING   Volume of Intake:  Birth Weight: 6 lb 10.6 oz  Hospital discharge weight: 9 lb 9.4 oz  Today's Weight 6 lb 8.1 oz  Total Intake: takes 1-2 oz every 2-3 hours via bottle  Output: 3-4 soil diapers in last 24 hours, 3-4 wet diapers in last 24 hours      FEEDING PLAN    Home Feeding Plan: Continue to feed on demand when  elicits feeding cues.  Babe should be eating 8-12 times in a 24 hour period.  Exclusivity explained and encouraged in the early weeks to establish breastfeeding and  order in milk supply.  Rooming-in encouraged with explanation of the benefits.  Continue to apply expressed breast milk and Lanolin cream to nipples after pumping for healing and comfort.  Postpartum breastfeeding assessment completed and education provided.  Items included in the education are:   effectiveness of feeding  manual expression  handling and storing breastmilk  maintenance of breastfeeding for the first 6 months  sign/symptoms of infant feeding issues requiring referral to qualified health care provider    LACTATION COMMENTS     Reassurance and encouragement provided in regard to mom's concerns about milk supply.  Follow-up support information provided.  Parents plan to keep  Well-Child Check with Dr. Srivastava as scheduled for 2 week well child check.      Face-to-face Time: 60 minutes with assessment and education.    Rosemarie Hernandez RN  3/25/2024  9:27 AM

## 2024-04-01 ENCOUNTER — MYC MEDICAL ADVICE (OUTPATIENT)
Dept: OBGYN | Facility: OTHER | Age: 34
End: 2024-04-01
Payer: COMMERCIAL

## 2024-04-01 DIAGNOSIS — O16.3 HYPERTENSION DURING PREGNANCY IN THIRD TRIMESTER, UNSPECIFIED HYPERTENSION IN PREGNANCY TYPE: Primary | ICD-10-CM

## 2024-04-01 RX ORDER — NIFEDIPINE 30 MG
30 TABLET, EXTENDED RELEASE ORAL DAILY
Qty: 30 TABLET | Refills: 0 | Status: SHIPPED | OUTPATIENT
Start: 2024-04-01 | End: 2024-05-07

## 2024-04-02 ENCOUNTER — MEDICAL CORRESPONDENCE (OUTPATIENT)
Dept: HEALTH INFORMATION MANAGEMENT | Facility: OTHER | Age: 34
End: 2024-04-02
Payer: COMMERCIAL

## 2024-04-04 DIAGNOSIS — F33.9 EPISODE OF RECURRENT MAJOR DEPRESSIVE DISORDER, UNSPECIFIED DEPRESSION EPISODE SEVERITY (H): ICD-10-CM

## 2024-04-04 DIAGNOSIS — F41.9 ANXIETY: ICD-10-CM

## 2024-04-08 NOTE — TELEPHONE ENCOUNTER
[facility] sent Rx request for the following:      Requested Prescriptions   Pending Prescriptions Disp Refills    FLUoxetine (PROZAC) 20 MG capsule [Pharmacy Med Name: fluoxetine 20 mg capsule] 90 capsule 1     Sig: TAKE 1 CAPSULE BY MOUTH DAILY   Last Prescription Date:   7/19/23  Last Fill Qty/Refills:         #90, R-1    Last Office Visit:              1/26/24   No upcoming appointments.    Prescription refilled per RN Medication Refill Policy.................... Brittaney Taylor RN ....................  4/8/2024   4:19 PM

## 2024-04-30 ENCOUNTER — PRENATAL OFFICE VISIT (OUTPATIENT)
Dept: OBGYN | Facility: OTHER | Age: 34
End: 2024-04-30
Attending: OBSTETRICS & GYNECOLOGY
Payer: COMMERCIAL

## 2024-04-30 VITALS
BODY MASS INDEX: 35.34 KG/M2 | OXYGEN SATURATION: 96 % | HEART RATE: 80 BPM | SYSTOLIC BLOOD PRESSURE: 134 MMHG | DIASTOLIC BLOOD PRESSURE: 87 MMHG | WEIGHT: 238.6 LBS | HEIGHT: 69 IN | RESPIRATION RATE: 18 BRPM

## 2024-04-30 DIAGNOSIS — Z30.430 ENCOUNTER FOR INTRAUTERINE DEVICE PLACEMENT: ICD-10-CM

## 2024-04-30 LAB — HCG UR QL: NEGATIVE

## 2024-04-30 PROCEDURE — 99207 PR POST-PARTUM 6 WK VISIT - GICH ONLY: CPT | Performed by: OBSTETRICS & GYNECOLOGY

## 2024-04-30 PROCEDURE — 58300 INSERT INTRAUTERINE DEVICE: CPT | Performed by: OBSTETRICS & GYNECOLOGY

## 2024-04-30 PROCEDURE — 250N000011 HC RX IP 250 OP 636: Performed by: OBSTETRICS & GYNECOLOGY

## 2024-04-30 PROCEDURE — 81025 URINE PREGNANCY TEST: CPT | Mod: ZL | Performed by: OBSTETRICS & GYNECOLOGY

## 2024-04-30 RX ADMIN — LEVONORGESTREL 1 EACH: 52 INTRAUTERINE DEVICE INTRAUTERINE at 11:20

## 2024-04-30 ASSESSMENT — ANXIETY QUESTIONNAIRES
2. NOT BEING ABLE TO STOP OR CONTROL WORRYING: NOT AT ALL
3. WORRYING TOO MUCH ABOUT DIFFERENT THINGS: NOT AT ALL
8. IF YOU CHECKED OFF ANY PROBLEMS, HOW DIFFICULT HAVE THESE MADE IT FOR YOU TO DO YOUR WORK, TAKE CARE OF THINGS AT HOME, OR GET ALONG WITH OTHER PEOPLE?: NOT DIFFICULT AT ALL
1. FEELING NERVOUS, ANXIOUS, OR ON EDGE: NOT AT ALL
GAD7 TOTAL SCORE: 0
IF YOU CHECKED OFF ANY PROBLEMS ON THIS QUESTIONNAIRE, HOW DIFFICULT HAVE THESE PROBLEMS MADE IT FOR YOU TO DO YOUR WORK, TAKE CARE OF THINGS AT HOME, OR GET ALONG WITH OTHER PEOPLE: NOT DIFFICULT AT ALL
7. FEELING AFRAID AS IF SOMETHING AWFUL MIGHT HAPPEN: NOT AT ALL
GAD7 TOTAL SCORE: 0
5. BEING SO RESTLESS THAT IT IS HARD TO SIT STILL: NOT AT ALL
4. TROUBLE RELAXING: NOT AT ALL
6. BECOMING EASILY ANNOYED OR IRRITABLE: NOT AT ALL
7. FEELING AFRAID AS IF SOMETHING AWFUL MIGHT HAPPEN: NOT AT ALL

## 2024-04-30 ASSESSMENT — EDINBURGH POSTNATAL DEPRESSION SCALE (EPDS)
I HAVE FELT SCARED OR PANICKY FOR NO GOOD REASON: NO, NOT AT ALL
I HAVE LOOKED FORWARD WITH ENJOYMENT TO THINGS: AS MUCH AS I EVER DID
TOTAL SCORE: 0
I HAVE BEEN SO UNHAPPY THAT I HAVE HAD DIFFICULTY SLEEPING: NOT AT ALL
THE THOUGHT OF HARMING MYSELF HAS OCCURRED TO ME: NEVER
I HAVE FELT SAD OR MISERABLE: NO, NOT AT ALL
THE THOUGHT OF HARMING MYSELF HAS OCCURRED TO ME: NEVER
I HAVE BLAMED MYSELF UNNECESSARILY WHEN THINGS WENT WRONG: NO, NEVER
I HAVE BEEN ABLE TO LAUGH AND SEE THE FUNNY SIDE OF THINGS: AS MUCH AS I ALWAYS COULD
I HAVE BEEN ABLE TO LAUGH AND SEE THE FUNNY SIDE OF THINGS: AS MUCH AS I ALWAYS COULD
I HAVE FELT SCARED OR PANICKY FOR NO GOOD REASON: NO, NOT AT ALL
I HAVE FELT SAD OR MISERABLE: NO, NOT AT ALL
THINGS HAVE BEEN GETTING ON TOP OF ME: NO, I HAVE BEEN COPING AS WELL AS EVER
I HAVE BEEN ANXIOUS OR WORRIED FOR NO GOOD REASON: NO, NOT AT ALL
I HAVE BEEN SO UNHAPPY THAT I HAVE BEEN CRYING: NO, NEVER
I HAVE BEEN SO UNHAPPY THAT I HAVE HAD DIFFICULTY SLEEPING: NOT AT ALL
I HAVE BEEN ANXIOUS OR WORRIED FOR NO GOOD REASON: NO, NOT AT ALL
I HAVE LOOKED FORWARD WITH ENJOYMENT TO THINGS: AS MUCH AS I EVER DID
THINGS HAVE BEEN GETTING ON TOP OF ME: NO, I HAVE BEEN COPING AS WELL AS EVER
I HAVE BEEN SO UNHAPPY THAT I HAVE BEEN CRYING: NO, NEVER
I HAVE BLAMED MYSELF UNNECESSARILY WHEN THINGS WENT WRONG: NO, NEVER

## 2024-04-30 ASSESSMENT — PAIN SCALES - GENERAL: PAINLEVEL: NO PAIN (0)

## 2024-04-30 NOTE — PROGRESS NOTES
"6 week Postpartum Visit Note    S:  Ms. Kizzy Chan is a 33 year old  here for her 6-week postpartum checkup.   - Had a  on 3/19/24.  - Infant gender:  boy, weight 6 pounds 10.6 oz.  - Feeding Method:  exclusive pumping.  Complications reported with feeding:  none, infant thriving .    - Bleeding:  None.  Duration:  stopped last week.  Menses resumed:  No  - Bowel/Urinary problems:  No  - Mood: good  - Sleep: decent    [place] Depression Scale  Thoughts of Harming Self: Never  Total Score: 0      - Contraception Planned:  Mirena  - She  has not had intercourse since delivery..    - Current tobacco use:  No  - Hx of Abuse:  No  ================================================================  ROS: 10 point ROS neg other than the symptoms noted above in the HPI.     O:  /87 (BP Location: Right arm, Patient Position: Sitting, Cuff Size: Adult Regular)   Pulse 80   Resp 18   Ht 1.753 m (5' 9\")   Wt 108.2 kg (238 lb 9.6 oz)   LMP 2023   SpO2 96%   Breastfeeding Yes   BMI 35.24 kg/m    Gen: Well-appearing, NAD  Psych:  Appropriate mood and affect  Breast: Deferred, no concerns  Abd:  Benign and Soft, flat, non-tender  Pelvic: Normal external genitalia. Normal vagina and cervix. Uterus normal size, anteverted, nontender. No adnexal masses or tenderness    IUD Insertion Note:      Time Out - \"Pause for the Cause\"  Just before the procedure begins, through verbal and active participation of team members, verify:    Initials   Patient Name    Kizzy Chan    Patient Date of Birth 1990    Procedure to be performed  Mirena IUD insertion     Consent:  Risks, benefits of treatment, and no treatment were discussed.  Patient's questions were elicited and answered.     After informed consent was obtained from the patient, a speculum was placed in the vagina to visualized the cervix.  The cervix was then swabbed with a betadine prep x 3.   Tenaculum was placed at the 12 " o'clock position on the cervix and the uterus sounded to 8cm.  The Mirena  IUD was then placed in the usual fashion under sterile technique.  Strings were clipped about 2 cm from the cervical os.  Tenaculum was removed and cervix was hemostatic.  There were no complications.  The patient tolerated the procedure well.    Lot: [id number]  Exp: 2026    A/P:  Ms. Kizzy Chan is a 33 year old  here for 6 week postpartum visit after . Doing well.  - Contraception: Mirena  - Feeding: exclusive pumping  - Follow-up: annual or sooner lavern Benavidez MD  OB/GYN  2024 10:53 AM

## 2024-04-30 NOTE — NURSING NOTE
Prior to the start of the procedure and with procedural staff participation, I verbally confirmed the patient s identity using two indicators, relevant allergies, that the procedure was appropriate and matched the consent or emergent situation, and that the correct equipment/implants were available. Immediately prior to starting the procedure I conducted the Time Out with the procedural staff and re-confirmed the patient s name, procedure, and site/side. (The Joint Commission universal protocol was followed.)  Yes    Sedation (Moderate or Deep): None    Sara Lewis LPN........................4/30/2024  11:22 AM

## 2024-04-30 NOTE — PROGRESS NOTES
"  {PROVIDER CHARTING PREFERENCE:248511}    Palak Durand is a 33 year old, presenting for the following health issues:  Prenatal Care (6 week postpartum )  {(!) Visit Details have not yet been documented.  Please enter Visit Details and then use this list to pull in documentation. (Optional):883786}  HPI     {MA/LPN/RN Pre-Provider Visit Orders- hCG/UA/Strep (Optional):899843}  {SUPERLIST (Optional):050850}  {additonal problems for provider to add (Optional):647294}    {ROS Picklists (Optional):884576}      Objective    /87 (BP Location: Right arm, Patient Position: Sitting, Cuff Size: Adult Regular)   Pulse 80   Resp 18   Ht 1.753 m (5' 9\")   Wt 108.2 kg (238 lb 9.6 oz)   LMP 07/06/2023   SpO2 96%   Breastfeeding Yes   BMI 35.24 kg/m    Body mass index is 35.24 kg/m .  Physical Exam   {Exam List (Optional):722914}    {Diagnostic Test Results (Optional):573199}        Signed Electronically by: Mayi Benavidez MD  {Email feedback regarding this note to [email or web address]   :515746}  "

## 2024-04-30 NOTE — NURSING NOTE
"Chief Complaint   Patient presents with    Prenatal Care     6 week postpartum      Patient presents to the clinic today for her six week postpartum check.   Initial /87 (BP Location: Right arm, Patient Position: Sitting, Cuff Size: Adult Regular)   Pulse 80   Resp 18   Ht 1.753 m (5' 9\")   Wt 108.2 kg (238 lb 9.6 oz)   LMP 07/06/2023   SpO2 96%   Breastfeeding Yes   BMI 35.24 kg/m   Estimated body mass index is 35.24 kg/m  as calculated from the following:    Height as of this encounter: 1.753 m (5' 9\").    Weight as of this encounter: 108.2 kg (238 lb 9.6 oz).  Medication Review: complete    The next two questions are to help us understand your food security.  If you are feeling you need any assistance in this area, we have resources available to support you today.          1/19/2024   SDOH- Food Insecurity   Within the past 12 months, did you worry that your food would run out before you got money to buy more? N   Within the past 12 months, did the food you bought just not last and you didn t have money to get more? N         Health Care Directive:  Patient does not have a Health Care Directive or Living Will: Discussed advance care planning with patient; however, patient declined at this time.    Italia Mejia      "

## 2024-05-07 ENCOUNTER — MYC REFILL (OUTPATIENT)
Dept: OBGYN | Facility: OTHER | Age: 34
End: 2024-05-07
Payer: COMMERCIAL

## 2024-05-07 DIAGNOSIS — O16.3 HYPERTENSION DURING PREGNANCY IN THIRD TRIMESTER, UNSPECIFIED HYPERTENSION IN PREGNANCY TYPE: ICD-10-CM

## 2024-05-07 NOTE — TELEPHONE ENCOUNTER
[facility] sent Rx request for the following:      Requested Prescriptions   Pending Prescriptions Disp Refills    NIFEdipine ER (ADALAT CC) 30 MG 24 hr tablet 30 tablet 0     Sig: Take 1 tablet (30 mg) by mouth daily       Calcium Channel Blockers Protocol  Failed - 5/7/2024  7:56 AM        Failed - Recent (12 mo) or future (90 days) visit within the authorizing provider's specialty     The patient must have completed an in-person or virtual visit within the past 12 months or has a future visit scheduled within the next 90 days with the authorizing provider s specialty.  Urgent care and e-visits do not quality as an office visit for this protocol.          Passed - Blood pressure under 140/90 in past 12 months     BP Readings from Last 3 Encounters:   04/30/24 134/87   03/20/24 128/71   03/12/24 (!) 144/86       Systolic (Patient Reported): 128 (3/14/2024  3:15 PM)  Diastolic (Patient Reported): (!) 92 (3/14/2024  3:15 PM)             Passed - Medication is active on med list  Passed - GFR is on file in the past 12 months and most recent GFR is normal  Passed - Patient is age 18 or older  Passed - No active pregnancy on record  Passed - No positive pregnancy test in past 12 months       Last Prescription Date:   4/30/2024  Last Fill Qty/Refills:        4/1/2024 , R-30    Last Office Visit:              4/30/2024   Future Office visit:             Next 5 appointments (look out 90 days)      May 23, 2024  8:40 AM  (Arrive by 8:25 AM)  Provider Visit with Lenora Osborne MD  [facility] ([facility] ) [address]  [place] [address]  [phone]           Patient requesting 90 day supply. Unable to complete prescription refill per RN Medication Refill Policy.  Cristel Chandler RN on 5/7/2024 at 11:28 AM

## 2024-05-08 RX ORDER — NIFEDIPINE 30 MG
30 TABLET, EXTENDED RELEASE ORAL DAILY
Qty: 30 TABLET | Refills: 0 | Status: SHIPPED | OUTPATIENT
Start: 2024-05-08 | End: 2024-05-23

## 2024-05-20 ENCOUNTER — MEDICAL CORRESPONDENCE (OUTPATIENT)
Dept: HEALTH INFORMATION MANAGEMENT | Facility: OTHER | Age: 34
End: 2024-05-20
Payer: COMMERCIAL

## 2024-05-23 ENCOUNTER — OFFICE VISIT (OUTPATIENT)
Dept: FAMILY MEDICINE | Facility: OTHER | Age: 34
End: 2024-05-23
Attending: FAMILY MEDICINE
Payer: COMMERCIAL

## 2024-05-23 VITALS
WEIGHT: 233.6 LBS | DIASTOLIC BLOOD PRESSURE: 74 MMHG | OXYGEN SATURATION: 96 % | BODY MASS INDEX: 34.5 KG/M2 | HEART RATE: 82 BPM | SYSTOLIC BLOOD PRESSURE: 128 MMHG | RESPIRATION RATE: 16 BRPM | TEMPERATURE: 96.9 F

## 2024-05-23 DIAGNOSIS — O16.3 HYPERTENSION DURING PREGNANCY IN THIRD TRIMESTER, UNSPECIFIED HYPERTENSION IN PREGNANCY TYPE: ICD-10-CM

## 2024-05-23 DIAGNOSIS — F41.9 ANXIETY: ICD-10-CM

## 2024-05-23 DIAGNOSIS — F33.9 EPISODE OF RECURRENT MAJOR DEPRESSIVE DISORDER, UNSPECIFIED DEPRESSION EPISODE SEVERITY (H): ICD-10-CM

## 2024-05-23 DIAGNOSIS — E66.811 CLASS 1 OBESITY WITH BODY MASS INDEX (BMI) OF 34.0 TO 34.9 IN ADULT, UNSPECIFIED OBESITY TYPE, UNSPECIFIED WHETHER SERIOUS COMORBIDITY PRESENT: Primary | ICD-10-CM

## 2024-05-23 PROCEDURE — 99213 OFFICE O/P EST LOW 20 MIN: CPT | Performed by: FAMILY MEDICINE

## 2024-05-23 PROCEDURE — G2211 COMPLEX E/M VISIT ADD ON: HCPCS | Performed by: FAMILY MEDICINE

## 2024-05-23 RX ORDER — METFORMIN HCL 500 MG
500 TABLET, EXTENDED RELEASE 24 HR ORAL
Qty: 90 TABLET | Refills: 3 | Status: SHIPPED | OUTPATIENT
Start: 2024-05-23

## 2024-05-23 RX ORDER — NIFEDIPINE 30 MG
30 TABLET, EXTENDED RELEASE ORAL DAILY
Qty: 90 TABLET | Refills: 3 | Status: SHIPPED | OUTPATIENT
Start: 2024-05-23

## 2024-05-23 RX ORDER — PNV NO.95/FERROUS FUM/FOLIC AC 28MG-0.8MG
TABLET ORAL
COMMUNITY
End: 2024-05-23

## 2024-05-23 ASSESSMENT — PAIN SCALES - GENERAL: PAINLEVEL: NO PAIN (0)

## 2024-05-23 NOTE — PROGRESS NOTES
Nursing Notes:   Stephania Mcfarlane LPN  5/23/2024  8:30 AM  Signed  Chief Complaint   Patient presents with    Weight Check         Medication Reconciliation: danny Mcfarlane LPN        Palak Durand is a 33 year old, presenting for the following health issues:  Weight Check        5/23/2024     8:29 AM   Additional Questions   Roomed by Stephania Mcfarlane     Kizzy is here today for follow up.  She would like to have Metformin  mg daily refilled.  She had been given a prescription for it last year prior to finding out she was pregnant. She has since delivered a baby boy on 3/19/24.  She had some left from before and has restarted that.  She has  been working with the [employer] [facility] for her history of binge eating.  She would like a referral locally to a dietician as the dietician through the [facility] is booking out several months.    Her blood pressures have still been a little elevated from her normal since her baby was born.  She did have some chronic hypertension during her pregnancy as well and has been on nifedipine.  She was also having some vasospasm of her nipples with breast feeding which this is helping with.  She needs that refilled as well.    She also needs a refill of prozac.  This has been helping for anxiety and depression and would like to continue her current dose.    History of Present Illness       Reason for visit:  Discuss weight    She eats 4 or more servings of fruits and vegetables daily.She consumes 0 sweetened beverage(s) daily.She exercises with enough effort to increase her heart rate 20 to 29 minutes per day.  She exercises with enough effort to increase her heart rate 5 days per week.   She is taking medications regularly.         3/11/2024     2:45 PM 3/18/2024    10:25 AM 4/29/2024    10:18 AM   PHQ   PHQ-9 Total Score 0 0 1   Q9: Thoughts of better off dead/self-harm past 2 weeks Not at all Not at all Not at all         7/25/2023     8:14 AM 8/22/2023      9:29 AM 4/30/2024    10:10 AM   CASEY-7 SCORE   Total Score 2 (minimal anxiety)  0 (minimal anxiety)   Total Score 2 3 0             Review of Systems  Constitutional, HEENT, cardiovascular, pulmonary, GI, , musculoskeletal, neuro, skin, endocrine and psych systems are negative, except as otherwise noted.      Objective    /74   Pulse 82   Temp 96.9  F (36.1  C) (Tympanic)   Resp 16   Wt 106 kg (233 lb 9.6 oz)   SpO2 96%   Breastfeeding Yes   BMI 34.50 kg/m    Body mass index is 34.5 kg/m .  Physical Exam  Constitutional:       Appearance: Normal appearance.   HENT:      Head: Normocephalic.   Eyes:      Extraocular Movements: Extraocular movements intact.      Pupils: Pupils are equal, round, and reactive to light.   Cardiovascular:      Rate and Rhythm: Normal rate and regular rhythm.      Heart sounds: Normal heart sounds. No murmur heard.  Pulmonary:      Effort: Pulmonary effort is normal.      Breath sounds: Normal breath sounds. No wheezing, rhonchi or rales.   Musculoskeletal:      Cervical back: Normal range of motion and neck supple.   Lymphadenopathy:      Cervical: No cervical adenopathy.   Neurological:      Mental Status: She is alert.   Psychiatric:         Mood and Affect: Mood normal.         Behavior: Behavior normal.                    ICD-10-CM    1. Class 1 obesity with body mass index (BMI) of 34.0 to 34.9 in adult, unspecified obesity type, unspecified whether serious comorbidity present  E66.9 metFORMIN (GLUCOPHAGE XR) 500 MG 24 hr tablet    Z68.34 Adult Nutrition  Referral      2. Hypertension during pregnancy in third trimester, unspecified hypertension in pregnancy type  O16.3 NIFEdipine ER (ADALAT CC) 30 MG 24 hr tablet      3. Anxiety  F41.9 FLUoxetine (PROZAC) 20 MG capsule      4. Episode of recurrent major depressive disorder, unspecified depression episode severity (H24)  F33.9 FLUoxetine (PROZAC) 20 MG capsule           Metformin refilled.  Referred to  dietician.  She is continuing to work with the [facility] as well.  Nifedipine refilled.  Stable.  Fluoxetine refilled.  See #3.    The longitudinal plan of care for the diagnosis(es)/condition(s) as documented were addressed during this visit. Due to the added complexity in care, I will continue to support Kizzy in the subsequent management and with ongoing continuity of care.      Lenora Osborne MD

## 2024-06-13 ENCOUNTER — VIRTUAL VISIT (OUTPATIENT)
Dept: FAMILY MEDICINE | Facility: OTHER | Age: 34
End: 2024-06-13
Attending: FAMILY MEDICINE
Payer: COMMERCIAL

## 2024-06-13 DIAGNOSIS — E66.811 CLASS 1 OBESITY WITH BODY MASS INDEX (BMI) OF 34.0 TO 34.9 IN ADULT, UNSPECIFIED OBESITY TYPE, UNSPECIFIED WHETHER SERIOUS COMORBIDITY PRESENT: ICD-10-CM

## 2024-06-13 PROCEDURE — 97802 MEDICAL NUTRITION INDIV IN: CPT | Mod: 95 | Performed by: DIETITIAN, REGISTERED

## 2024-06-13 NOTE — PROGRESS NOTES
Kizzy is on via video chat today for MNT related to desired wt loss following pregnancy, overall healthy eating changes.      PCP: Lenora Osborne    Kizzy is post-partum ~3 months. She is pumping for her baby. She has found that she is very hungry in the evening and tends to snack at that time. Discussed her estimated calorie needs with additional calories for feeding. She has a goal of ~200#, currently around 239#. She has not been tracking her intakes. Plans to return to work in about 2 weeks.     Dx:   Encounter Diagnosis   Name Primary?    Class 1 obesity with body mass index (BMI) of 34.0 to 34.9 in adult, unspecified obesity type, unspecified whether serious comorbidity present      Diet: 3430-3035 kcal per day (with ~400 additional dakotah for feeding), 73-91 g protein plus ~17 g for feeding.     Education today: discussed estimated needs, getting enough to eat to avoid binge snacking in the evening, spacing out her meals/snacks. Discussed Mediterranean lifestyle changes.     Materials provided via mail will be: sample meal plans and recipes, protein content of foods list, Mediterranean eating guide with sample meals, guide to good nutrition, and carb/fiber content of foods list.     Goal/plan: Kizzy will track her intakes for ~1 week and make adjustment to align more closely with her estimated needs. She will space out her meals/snacks to avoid being over hungry at night. She will increase her fiber intake. She will increase her activity, starting with 15 min 4 times/week and increase as able.     Kizzy stated understanding and had no questions at this time.     Time spent via video: 19 min.     Radha Ariza RD on 6/13/2024 at 2:41 PM    Answers submitted by the patient for this visit:  General Questionnaire (Submitted on 6/10/2024)  Chief Complaint: Chronic problems general questions HPI Form  What is the reason for your visit today? : Nutrition  How many servings of fruits and vegetables do you  eat daily?: 2-3  On average, how many sweetened beverages do you drink each day (Examples: soda, juice, sweet tea, etc.  Do NOT count diet or artificially sweetened beverages)?: 0  How many minutes a day do you exercise enough to make your heart beat faster?: 9 or less  How many days a week do you exercise enough to make your heart beat faster?: 3 or less  How many days per week do you miss taking your medication?: 0

## 2024-07-23 ENCOUNTER — MEDICAL CORRESPONDENCE (OUTPATIENT)
Dept: HEALTH INFORMATION MANAGEMENT | Facility: OTHER | Age: 34
End: 2024-07-23
Payer: COMMERCIAL

## 2024-07-29 ENCOUNTER — OFFICE VISIT (OUTPATIENT)
Dept: FAMILY MEDICINE | Facility: OTHER | Age: 34
End: 2024-07-29
Attending: FAMILY MEDICINE
Payer: COMMERCIAL

## 2024-07-29 VITALS
DIASTOLIC BLOOD PRESSURE: 80 MMHG | TEMPERATURE: 97.4 F | SYSTOLIC BLOOD PRESSURE: 126 MMHG | BODY MASS INDEX: 35.91 KG/M2 | RESPIRATION RATE: 16 BRPM | OXYGEN SATURATION: 97 % | WEIGHT: 243.2 LBS | HEART RATE: 66 BPM

## 2024-07-29 DIAGNOSIS — M62.89 PELVIC FLOOR DYSFUNCTION: Primary | ICD-10-CM

## 2024-07-29 DIAGNOSIS — K64.4 EXTERNAL HEMORRHOIDS: ICD-10-CM

## 2024-07-29 PROCEDURE — 99213 OFFICE O/P EST LOW 20 MIN: CPT | Performed by: FAMILY MEDICINE

## 2024-07-29 PROCEDURE — G2211 COMPLEX E/M VISIT ADD ON: HCPCS | Performed by: FAMILY MEDICINE

## 2024-07-29 ASSESSMENT — PAIN SCALES - GENERAL: PAINLEVEL: NO PAIN (0)

## 2024-07-29 NOTE — PROGRESS NOTES
Nursing Notes:   Stephania Mcfarlane LPN  7/29/2024  1:33 PM  Signed  Chief Complaint   Patient presents with    Pelvic Pressure         Medication Reconciliation: complete    Stephania Mcfarlane LPN      Palak Durand is a 33 year old, presenting for the following health issues:  Pelvic Pressure      7/29/2024     1:33 PM   Additional Questions   Roomed by Stephania Mcfarlane     Kizzy is here today for a complaint of pelvic floor dysfunction.  She recently had a baby in March.  Since then, she has noted some pelvic heaviness when she is on her feet or trying to walk fast or run after her kids.  She has not really been able to feel any actual prolapse.  Would like a pelvic floor referral to Physical Therapy to see if this would be helpful..  Feels a pressure/pulling sensation when walking.    Has had very bad hemorrhoids since delivery.  Initially, she did have a thrombosed hemorrhoid, which was very painful.  That has subsided, but is noticing some intermittent bleeding with bowel movements at times.  She has been trying to drink plenty water and eat plenty of fiber containing foods to help with this.    History of Present Illness       Reason for visit:  Pelvic floor pt referral    She eats 4 or more servings of fruits and vegetables daily.She consumes 0 sweetened beverage(s) daily.She exercises with enough effort to increase her heart rate 9 or less minutes per day.  She exercises with enough effort to increase her heart rate 3 or less days per week.   She is taking medications regularly.         PT for pelvic floor         Review of Systems  Constitutional, HEENT, cardiovascular, pulmonary, GI, , musculoskeletal, neuro, skin, endocrine and psych systems are negative, except as otherwise noted.      Objective    Pulse 66   Temp 97.4  F (36.3  C) (Tympanic)   Resp 16   Wt 110.3 kg (243 lb 3.2 oz)   LMP  (LMP Unknown)   SpO2 97%   Breastfeeding Yes   BMI 35.91 kg/m    Body mass index is 35.91 kg/m .  Physical  Exam  Constitutional:       Appearance: Normal appearance.   HENT:      Head: Normocephalic.   Eyes:      Extraocular Movements: Extraocular movements intact.      Pupils: Pupils are equal, round, and reactive to light.   Cardiovascular:      Rate and Rhythm: Normal rate and regular rhythm.      Heart sounds: Normal heart sounds. No murmur heard.  Pulmonary:      Effort: Pulmonary effort is normal.      Breath sounds: Normal breath sounds. No wheezing, rhonchi or rales.   Abdominal:      General: Bowel sounds are normal.      Palpations: Abdomen is soft.   Genitourinary:     Comments: Exam declined today.  Musculoskeletal:      Cervical back: Normal range of motion and neck supple.   Lymphadenopathy:      Cervical: No cervical adenopathy.   Neurological:      Mental Status: She is alert.   Psychiatric:         Mood and Affect: Mood normal.         Behavior: Behavior normal.            ICD-10-CM    1. Pelvic floor dysfunction  M62.89 Physical Therapy  Referral      2. External hemorrhoids  K64.4           She is referred to Diane Cano for pelvic floor physical therapy.  She will continue to try to eat plenty of fruits/veggies and drink water and monitor the bleeding.  If she feels that this is progressing or seems other than she would expect with hemorrhoids, she will notify me and consider colonoscopy at that point.    No follow-ups on file.     The longitudinal plan of care for the diagnosis(es)/condition(s) as documented were addressed during this visit. Due to the added complexity in care, I will continue to support Kizzy in the subsequent management and with ongoing continuity of care.      Lenora Osborne MD

## 2024-07-29 NOTE — NURSING NOTE
Chief Complaint   Patient presents with    Pelvic Pressure         Medication Reconciliation: complete    Stephania Mcfarlane, LPN

## 2024-09-10 ENCOUNTER — THERAPY VISIT (OUTPATIENT)
Dept: PHYSICAL THERAPY | Facility: OTHER | Age: 34
End: 2024-09-10
Attending: FAMILY MEDICINE
Payer: COMMERCIAL

## 2024-09-10 DIAGNOSIS — M62.89 PELVIC FLOOR DYSFUNCTION: ICD-10-CM

## 2024-09-10 PROCEDURE — 97161 PT EVAL LOW COMPLEX 20 MIN: CPT | Mod: GP

## 2024-09-10 PROCEDURE — 97140 MANUAL THERAPY 1/> REGIONS: CPT | Mod: GP

## 2024-09-10 PROCEDURE — 97110 THERAPEUTIC EXERCISES: CPT | Mod: GP

## 2024-09-10 NOTE — PROGRESS NOTES
PHYSICAL THERAPY EVALUATION  Type of Visit: Evaluation              Subjective       Presenting condition or subjective complaint: Pelvic pressure with walking, lifting  Patient referred to PT with complaints of pelvic pressure following birth of third child. Reports she had limited physical activity for 3 months post partum, then started to notice pelvic pressure with movement, closing of glottis, lifting. No pain. Only has leakage if her bladder is full and she sneezes. Also notices pressure when on her peloton in standing position. Difficulty fully emptying bladder and dealing with constipation. Drinks about 100 ounces of water daily.   Date of onset: 07/29/24    Relevant medical history:     Past Medical History:   Diagnosis Date    Anxiety     Asthma     exercise induced as child    Carrier of group B Streptococcus     Complication of anesthesia     Family history of malignant neoplasm of breast     mom and grandma; q6 month alternating mammo and MRI after done nursing    Hypertension     Gestational HTN           Prior diagnostic imaging/testing results:     NA  Prior therapy history for the same diagnosis, illness or injury: No      Prior Level of Function  Transfers: Independent  Ambulation: Independent  ADL: Independent    Living Environment  Social support: With a significant other or spouse   Type of home: House; 2-story   Stairs to enter the home: Yes 1 Is there a railing: No     Ramp: No   Stairs inside the home: Yes 12 Is there a railing: Yes     Help at home: None  Equipment owned:       Employment: Yes Physician  Hobbies/Interests: Peloton. Lifting my kids up    Patient goals for therapy:  improve pelvic strength    Pain assessment: Pain denied     Objective      PELVIC EVALUATION  ADDITIONAL HISTORY:           Discussed reason for referral regarding pelvic health needs and external/internal pelvic floor muscle examination with patient/guardian.  Opportunity provided to ask questions and verbal  consent for assessment and intervention was given.    POSTURE:  left shoulder superior to right, left iliac crest superior to right  LUMBAR SCREEN:  will continue to assess  HIP SCREEN:  Will continue to assess  PELVIC/SI SCREEN:  + FFT left, left superior pubic shear      PELVIC EXAM    External Digital Palpation per Perineum:   No pain    Internal Digital Palpation:  Per Vagina:  Digital Muscle Performance: P (Power): 0-1/5  E (Endurance): 1  R (Repetitions): 1  F (Fast Twitch): 0  Compensations: Adductors, Gluts        Pelvic Organ Prolapse:   Will continue to assess    ABDOMINAL ASSESSMENT  Diastasis Rectus Abdominis (MARGAUX):  Will continue to assess    Abdominal Activation/Strength:  noted weakness with TA      Fascial Tension/Restriction:  noted restriction with liver motility        Assessment & Plan   CLINICAL IMPRESSIONS  Medical Diagnosis: M62.89 (ICD-10-CM) - Pelvic floor dysfunction    Treatment Diagnosis: muscle weakness   Impression/Assessment: Patient is a 33 year old female with pelvic pressure complaints.  The following significant findings have been identified: Decreased strength, Impaired muscle performance, Decreased activity tolerance, and Impaired posture. These impairments interfere with their ability to perform work tasks, recreational activities, household chores, household mobility, and community mobility as compared to previous level of function.     Clinical Decision Making (Complexity):  Clinical Presentation: Stable/Uncomplicated  Clinical Presentation Rationale: based on medical and personal factors listed in PT evaluation  Clinical Decision Making (Complexity): Low complexity    PLAN OF CARE  Treatment Interventions:  Modalities: Biofeedback, E-stim  Interventions: Manual Therapy, Neuromuscular Re-education, Therapeutic Activity, Therapeutic Exercise, Self-Care/Home Management    Long Term Goals     PT Goal 1  Goal Identifier: MMT  Goal Description: Patient to have at last 4/5 MMT of  pelvic floor muscles to aid in reduced risk for incontinence and to aid in support to pelvic organs.  Target Date: 03/01/25  PT Goal 2  Goal Identifier: abdominal strength  Goal Description: Patient to have at least 4/5 strength for transversus abs to aid in spine and pelvic stability while liftng and caring for children.  Target Date: 03/01/25  PT Goal 3  Goal Identifier: pressure  Goal Description: Patient to report reduced symptoms of pressure by at least 50% during functional tasks.  Target Date: 03/01/25      Frequency of Treatment: 1 time per week, as needed  Duration of Treatment: 3-6 months    Recommended Referrals to Other Professionals:  NA  Education Assessment:   Learner/Method: Patient;No Barriers to Learning    Risks and benefits of evaluation/treatment have been explained.   Patient/Family/caregiver agrees with Plan of Care.     Evaluation Time:     PT Eval, Low Complexity Minutes (87174): 15       Signing Clinician: Diane Cano PT

## 2024-09-24 ENCOUNTER — MEDICAL CORRESPONDENCE (OUTPATIENT)
Dept: HEALTH INFORMATION MANAGEMENT | Facility: OTHER | Age: 34
End: 2024-09-24

## 2024-09-24 ENCOUNTER — THERAPY VISIT (OUTPATIENT)
Dept: PHYSICAL THERAPY | Facility: OTHER | Age: 34
End: 2024-09-24
Attending: FAMILY MEDICINE
Payer: COMMERCIAL

## 2024-09-24 DIAGNOSIS — M62.89 PELVIC FLOOR DYSFUNCTION: Primary | ICD-10-CM

## 2024-09-24 PROCEDURE — 97110 THERAPEUTIC EXERCISES: CPT | Mod: GP

## 2024-10-01 ENCOUNTER — THERAPY VISIT (OUTPATIENT)
Dept: PHYSICAL THERAPY | Facility: OTHER | Age: 34
End: 2024-10-01
Attending: FAMILY MEDICINE
Payer: COMMERCIAL

## 2024-10-01 DIAGNOSIS — M62.89 PELVIC FLOOR DYSFUNCTION: Primary | ICD-10-CM

## 2024-10-01 PROCEDURE — 97110 THERAPEUTIC EXERCISES: CPT | Mod: GP

## 2024-10-08 ENCOUNTER — THERAPY VISIT (OUTPATIENT)
Dept: PHYSICAL THERAPY | Facility: OTHER | Age: 34
End: 2024-10-08
Attending: FAMILY MEDICINE
Payer: COMMERCIAL

## 2024-10-08 ENCOUNTER — OFFICE VISIT (OUTPATIENT)
Dept: FAMILY MEDICINE | Facility: OTHER | Age: 34
End: 2024-10-08
Attending: FAMILY MEDICINE
Payer: COMMERCIAL

## 2024-10-08 VITALS
DIASTOLIC BLOOD PRESSURE: 74 MMHG | OXYGEN SATURATION: 97 % | WEIGHT: 236.2 LBS | RESPIRATION RATE: 16 BRPM | BODY MASS INDEX: 34.88 KG/M2 | TEMPERATURE: 97.9 F | HEART RATE: 68 BPM | SYSTOLIC BLOOD PRESSURE: 136 MMHG

## 2024-10-08 DIAGNOSIS — M25.371 RIGHT ANKLE INSTABILITY: ICD-10-CM

## 2024-10-08 DIAGNOSIS — M62.89 PELVIC FLOOR DYSFUNCTION: Primary | ICD-10-CM

## 2024-10-08 DIAGNOSIS — E66.811 CLASS 1 OBESITY WITH BODY MASS INDEX (BMI) OF 34.0 TO 34.9 IN ADULT, UNSPECIFIED OBESITY TYPE, UNSPECIFIED WHETHER SERIOUS COMORBIDITY PRESENT: Primary | ICD-10-CM

## 2024-10-08 PROCEDURE — G2211 COMPLEX E/M VISIT ADD ON: HCPCS | Performed by: FAMILY MEDICINE

## 2024-10-08 PROCEDURE — 99213 OFFICE O/P EST LOW 20 MIN: CPT | Performed by: FAMILY MEDICINE

## 2024-10-08 PROCEDURE — 97110 THERAPEUTIC EXERCISES: CPT | Mod: GP

## 2024-10-08 RX ORDER — TIRZEPATIDE 5 MG/.5ML
INJECTION, SOLUTION SUBCUTANEOUS
COMMUNITY
Start: 2024-09-09

## 2024-10-08 ASSESSMENT — PAIN SCALES - GENERAL: PAINLEVEL: NO PAIN (0)

## 2024-10-08 NOTE — NURSING NOTE
Chief Complaint   Patient presents with    Weight Problem         Medication Reconciliation: complete    Stephania Mcfarlane, LPN

## 2024-10-08 NOTE — PROGRESS NOTES
Nursing Notes:   Stephania Mcfarlane LPN  10/8/2024  1:16 PM  Signed  Chief Complaint   Patient presents with    Weight Problem         Medication Reconciliation: complete    Stephania Mcfarlane LPN      Palak Durand is a 33 year old, presenting for the following health issues:  Weight Problem      10/8/2024     1:15 PM   Additional Questions   Roomed by Stephania Mcfarlane     Kizzy is here today for follow up.      Has been on zepbound for 6 weeks through [employer].  Currently on 5 mg every 7 days.  Down almost 10 lb since starting it. No new side effects.  Doing weight watchers and is trying to be more mindful to eat decent meals and not snacking.  Also started exercising.  Has to pay $550 per month for zepbound right now.  Would like this refilled through our clinic instead of [facility].  She is on metformin as well.    Sprained her right ankle many years ago.  Lately, once a month twists her ankle and reinjures it.  Feels like her ankle subluxes and makes a grinding noise. Feels unstable.  Feels like it will often give out.  Has been able to bear weight without increased pain.  Feels like it is at its baseline currently.    History of Present Illness       Reason for visit:  Weightloss med and right ankle    She eats 4 or more servings of fruits and vegetables daily.She consumes 0 sweetened beverage(s) daily.She exercises with enough effort to increase her heart rate 20 to 29 minutes per day.  She exercises with enough effort to increase her heart rate 4 days per week.   She is taking medications regularly.         Follow-up weight and right ankle         Review of Systems  Constitutional, HEENT, cardiovascular, pulmonary, GI, , musculoskeletal, neuro, skin, endocrine and psych systems are negative, except as otherwise noted.      Objective    /74   Pulse 68   Temp 97.9  F (36.6  C) (Tympanic)   Resp 16   Wt 107.1 kg (236 lb 3.2 oz)   SpO2 97%   Breastfeeding No   BMI 34.88 kg/m    Body  mass index is 34.88 kg/m .  Physical Exam  Constitutional:       Appearance: Normal appearance.   Eyes:      Extraocular Movements: Extraocular movements intact.      Pupils: Pupils are equal, round, and reactive to light.   Cardiovascular:      Rate and Rhythm: Normal rate and regular rhythm.      Heart sounds: Normal heart sounds. No murmur heard.  Pulmonary:      Effort: Pulmonary effort is normal.      Breath sounds: Normal breath sounds. No wheezing, rhonchi or rales.   Musculoskeletal:      Cervical back: Normal range of motion and neck supple.      Comments: Right ankle:  swelling over lateral malleolus and some bruising near her medial malleolus.  No pain over base of 5th metatarsal or navicular bone.  Positive anterior drawer test.  Pain with inversion of ankle.     Lymphadenopathy:      Cervical: No cervical adenopathy.   Neurological:      Mental Status: She is alert.                ICD-10-CM    1. Class 1 obesity with body mass index (BMI) of 34.0 to 34.9 in adult, unspecified obesity type, unspecified whether serious comorbidity present  E66.811 tirzepatide-Weight Management (ZEPBOUND) 7.5 MG/0.5ML prefilled pen    Z68.34       2. Right ankle instability  M25.371 Physical Therapy  Referral           Increased zepbound to 7.5 mg every 7 days, which she will increase to once her current 5 mg pen is used up.  Follow up as needed.  Referred to Physical Therapy for strengthening of ankle in the hopes that this will help prevent repeat injury.  If not helpful, will plan to refer to Orthopedics.    No follow-ups on file.     The longitudinal plan of care for the diagnosis(es)/condition(s) as documented were addressed during this visit. Due to the added complexity in care, I will continue to support Kizzy in the subsequent management and with ongoing continuity of care.      Lenora Osborne MD

## 2024-10-15 ENCOUNTER — THERAPY VISIT (OUTPATIENT)
Dept: PHYSICAL THERAPY | Facility: OTHER | Age: 34
End: 2024-10-15
Attending: FAMILY MEDICINE
Payer: COMMERCIAL

## 2024-10-15 DIAGNOSIS — M62.89 PELVIC FLOOR DYSFUNCTION: Primary | ICD-10-CM

## 2024-10-15 PROCEDURE — 97110 THERAPEUTIC EXERCISES: CPT | Mod: GP

## 2024-10-22 ENCOUNTER — IMMUNIZATION (OUTPATIENT)
Dept: FAMILY MEDICINE | Facility: OTHER | Age: 34
End: 2024-10-22
Attending: FAMILY MEDICINE
Payer: COMMERCIAL

## 2024-10-22 DIAGNOSIS — Z23 HIGH PRIORITY FOR 2019-NCOV VACCINE: Primary | ICD-10-CM

## 2024-10-22 PROCEDURE — 91320 SARSCV2 VAC 30MCG TRS-SUC IM: CPT

## 2024-10-22 PROCEDURE — 90480 ADMN SARSCOV2 VAC 1/ONLY CMP: CPT

## 2024-11-05 ENCOUNTER — THERAPY VISIT (OUTPATIENT)
Dept: PHYSICAL THERAPY | Facility: OTHER | Age: 34
End: 2024-11-05
Attending: FAMILY MEDICINE
Payer: COMMERCIAL

## 2024-11-05 DIAGNOSIS — M62.89 PELVIC FLOOR DYSFUNCTION: Primary | ICD-10-CM

## 2024-11-05 PROCEDURE — 97110 THERAPEUTIC EXERCISES: CPT | Mod: GP

## 2024-11-08 ENCOUNTER — MYC MEDICAL ADVICE (OUTPATIENT)
Dept: FAMILY MEDICINE | Facility: OTHER | Age: 34
End: 2024-11-08
Payer: COMMERCIAL

## 2024-11-08 DIAGNOSIS — E66.811 CLASS 1 OBESITY WITH BODY MASS INDEX (BMI) OF 34.0 TO 34.9 IN ADULT, UNSPECIFIED OBESITY TYPE, UNSPECIFIED WHETHER SERIOUS COMORBIDITY PRESENT: Primary | ICD-10-CM

## 2024-11-08 RX ORDER — TIRZEPATIDE 10 MG/.5ML
10 INJECTION, SOLUTION SUBCUTANEOUS
Qty: 2 ML | Refills: 11 | Status: SHIPPED | OUTPATIENT
Start: 2024-11-08

## 2024-11-08 NOTE — TELEPHONE ENCOUNTER
Finishing 7.5mg dosage with no side effects.     10mg dose increase angela'd up for pharmacy requested.     Arabella Angeles RN on 11/8/2024 at 8:37 AM

## 2024-11-19 ENCOUNTER — THERAPY VISIT (OUTPATIENT)
Dept: PHYSICAL THERAPY | Facility: OTHER | Age: 34
End: 2024-11-19
Attending: FAMILY MEDICINE
Payer: COMMERCIAL

## 2024-11-19 ENCOUNTER — MYC REFILL (OUTPATIENT)
Dept: FAMILY MEDICINE | Facility: OTHER | Age: 34
End: 2024-11-19

## 2024-11-19 DIAGNOSIS — E66.811 CLASS 1 OBESITY WITH BODY MASS INDEX (BMI) OF 34.0 TO 34.9 IN ADULT, UNSPECIFIED OBESITY TYPE, UNSPECIFIED WHETHER SERIOUS COMORBIDITY PRESENT: ICD-10-CM

## 2024-11-19 DIAGNOSIS — M62.89 PELVIC FLOOR DYSFUNCTION: Primary | ICD-10-CM

## 2024-11-19 PROCEDURE — 97110 THERAPEUTIC EXERCISES: CPT | Mod: GP

## 2024-11-19 RX ORDER — METFORMIN HYDROCHLORIDE 500 MG/1
500 TABLET, EXTENDED RELEASE ORAL
Qty: 90 TABLET | Refills: 3 | OUTPATIENT
Start: 2024-11-19

## 2024-11-19 NOTE — TELEPHONE ENCOUNTER
[facility] sent Rx request for the following:      Requested Prescriptions   Pending Prescriptions Disp Refills    metFORMIN (GLUCOPHAGE XR) 500 MG 24 hr tablet 90 tablet 3     Sig: Take 1 tablet (500 mg) by mouth daily (with dinner).       Biguanide Agents Passed - 11/19/2024  3:08 PM        Passed - Patient is age 10 or older        Passed - Patient does NOT have a diagnosis of CHF.        Passed - Medication is active on med list        Passed - Medication indicated for associated diagnosis     Medication is associated with one or more of the following diagnoses:     Gestational diabetes mellitus     Hyperinsulinar obesity     Hypersecretion of ovarian androgens    Non-alcoholic fatty liver    Polycystic ovarian syndrome               Pre-diabetes (DM 2 prevention)    Type 2 diabetes mellitus     Weight gain, antipsychotic therapy-induced    Impaired fasting glucose          Passed - Has GFR on file in past 12 months and most recent value is normal        Passed - Recent (6 mo) or future (90 days) visit within the authorizing provider's specialty     The patient must have completed an in-person or virtual visit within the past 6 months or has a future visit scheduled within the next 90 days with the authorizing provider s specialty.  Urgent care and e-visits do not quality as an office visit for this protocol.          Passed - Patient is not pregnant        Passed - Patient has not had a positive pregnancy test within the past 12 mos.              Last Prescription Date:   5/23/24  Last Fill Qty/Refills:         90, R-3    Last Office Visit:              10/8/24   Future Office visit:           None    Called and spoke to pharmacy. States patient has refills and they were sent in the mail today.     Unable to complete prescription refill per RN Medication Refill Policy.     Redundant refill request refused: Too soon:      Tomas Alaniz RN on 11/19/2024 at 3:12 PM

## 2024-11-22 ENCOUNTER — HOSPITAL ENCOUNTER (OUTPATIENT)
Dept: MAMMOGRAPHY | Facility: OTHER | Age: 34
Discharge: HOME | End: 2024-11-22
Attending: SURGERY | Admitting: SURGERY
Payer: COMMERCIAL

## 2024-11-22 DIAGNOSIS — Z12.31 ENCOUNTER FOR SCREENING MAMMOGRAM FOR HIGH-RISK PATIENT: ICD-10-CM

## 2024-11-22 PROCEDURE — 77067 SCR MAMMO BI INCL CAD: CPT

## 2024-11-22 PROCEDURE — 77063 BREAST TOMOSYNTHESIS BI: CPT

## 2024-11-26 ENCOUNTER — THERAPY VISIT (OUTPATIENT)
Dept: PHYSICAL THERAPY | Facility: OTHER | Age: 34
End: 2024-11-26
Attending: FAMILY MEDICINE
Payer: COMMERCIAL

## 2024-11-26 DIAGNOSIS — M25.371 RIGHT ANKLE INSTABILITY: ICD-10-CM

## 2024-11-26 PROCEDURE — 97112 NEUROMUSCULAR REEDUCATION: CPT | Mod: GP

## 2024-11-26 PROCEDURE — 97110 THERAPEUTIC EXERCISES: CPT | Mod: GP

## 2024-11-26 PROCEDURE — 97161 PT EVAL LOW COMPLEX 20 MIN: CPT | Mod: GP

## 2024-11-26 NOTE — PROGRESS NOTES
PHYSICAL THERAPY EVALUATION  Type of Visit: Evaluation        Fall Risk Screen:  Fall screen completed by: PT  Have you fallen 2 or more times in the past year?: Yes  Have you fallen and had an injury in the past year?: Yes  Is patient a fall risk?: Department fall risk interventions implemented    Subjective         Presenting condition or subjective complaint: R ankle instability  Date of onset: 09/01/24 (recurrent injury over several years, most recently september 2024)    Relevant medical history: History of fractures   Prior therapy history for the same diagnosis, illness or injury: Yes, years ago      Prior Level of Function  Transfers: Independent  Ambulation: Independent  ADL: Independent    Living Environment  Social support: With a significant other or spouse   Type of home: House; 2-story   Stairs to enter the home: Yes 1 Is there a railing: No  Stairs inside the home: Yes 12 Is there a railing: Yes  Employment: Yes Physician    Pain assessment: Pain present  See objective evaluation for additional pain details     Objective   FOOT/ANKLE EVALUATION  PAIN: Pain Level at Rest: 0/10  Pain Level with Use: 7/10  Pain Quality: Aching  Pain Frequency: intermittent  Pain is Exacerbated By: walking  Pain is Relieved By: cold and brace  INTEGUMENTARY (edema, incisions): WFL  POSTURE: WFL  BALANCE/PROPRIOCEPTION: Single Leg Stance Eyes Open (seconds): R 40sec, L 60 sec  ROM:   (Degrees) Left AROM Right AROM   Ankle Dorsiflexion 10 10   Ankle Plantarflexion 90 95   Ankle Inversion 37 70   Ankle Eversion 30 45     STRENGTH:   Pain: - none + mild ++ moderate +++ severe  Strength Scale: 0-5/5 Left Right   Ankle Dorsiflexion 4+ 4+   Ankle Plantarflexion 4 4   Ankle Inversion 4+ 4   Ankle Eversion 4 4   Knee Flexion 4 4   Knee Extension 4+ 4+     SPECIAL TESTS:    Left Right   Anderson Sign Negative  Negative    Ligamentous Stability     Anterior Drawer Positive Positive   Talar Tilt Negative Negative     PALPATION:  WFL  JOINT MOBILITY: WFL    Assessment & Plan   CLINICAL IMPRESSIONS  Medical Diagnosis: Right ankle instability    Treatment Diagnosis: R ankle instability, strength & motor control deficits   Impression/Assessment: Patient is a 34 year old female with R ankle complaints.  The following significant findings have been identified: Pain, Decreased ROM/flexibility, Decreased strength, Impaired balance, Decreased proprioception, Impaired gait, Impaired muscle performance, Decreased activity tolerance, and Instability. These impairments interfere with their ability to perform self care tasks, work tasks, recreational activities, household mobility, and community mobility as compared to previous level of function.     Clinical Decision Making (Complexity):  Clinical Presentation: Stable/Uncomplicated  Clinical Presentation Rationale: based on medical and personal factors listed in PT evaluation  Clinical Decision Making (Complexity): Low complexity    PLAN OF CARE  Treatment Interventions:  Modalities: Cryotherapy, E-stim, Hot Pack, Ultrasound, Vasoneumatic Device  Interventions: Gait Training, Manual Therapy, Neuromuscular Re-education, Therapeutic Activity, Therapeutic Exercise    Long Term Goals     PT Goal 1  Goal Identifier: balance  Goal Description: patient will demonstrate improved R LE balance as evidenced by SLS of 50 seconds or greater  Rationale: to maximize safety and independence with performance of ADLs and functional tasks;to maximize safety and independence within the home;to maximize safety and independence within the community  Target Date: 01/07/25  PT Goal 2  Goal Identifier: stairs  Goal Description: patient will report 50% increase in confidence in R ankle when descending stairs  Rationale: to maximize safety and independence with performance of ADLs and functional tasks;to maximize safety and independence within the home;to maximize safety and independence within the community  Target Date:  01/21/25  PT Goal 3  Goal Identifier: ROM  Goal Description: patient will demonstrate 15 degrees or greater R ankle dorsiflexion  Rationale: to maximize safety and independence with performance of ADLs and functional tasks;to maximize safety and independence within the home;to maximize safety and independence within the community  Target Date: 01/21/25      Frequency of Treatment: 1x/week  Duration of Treatment: 8 weeks    Education Assessment:   Learner/Method: Patient;Listening;Demonstration;Pictures/Video;No Barriers to Learning    Risks and benefits of evaluation/treatment have been explained.   Patient/Family/caregiver agrees with Plan of Care.     Evaluation Time:     RETIRED PT Olga Low Complexity Minutes (33953): 15     Signing Clinician: Radha Mcnally DPT

## 2024-12-09 ENCOUNTER — HOSPITAL ENCOUNTER (OUTPATIENT)
Dept: MAMMOGRAPHY | Facility: OTHER | Age: 34
Discharge: HOME | End: 2024-12-09
Attending: FAMILY MEDICINE
Payer: COMMERCIAL

## 2024-12-09 ENCOUNTER — HOSPITAL ENCOUNTER (OUTPATIENT)
Dept: ULTRASOUND IMAGING | Facility: OTHER | Age: 34
Discharge: HOME | End: 2024-12-09
Attending: FAMILY MEDICINE
Payer: COMMERCIAL

## 2024-12-09 DIAGNOSIS — R92.8 ABNORMAL FINDING ON BREAST IMAGING: ICD-10-CM

## 2024-12-09 PROCEDURE — 77061 BREAST TOMOSYNTHESIS UNI: CPT | Mod: LT

## 2024-12-09 PROCEDURE — 76642 ULTRASOUND BREAST LIMITED: CPT | Mod: LT

## 2024-12-09 PROCEDURE — 77065 DX MAMMO INCL CAD UNI: CPT | Mod: LT

## 2024-12-10 ENCOUNTER — THERAPY VISIT (OUTPATIENT)
Dept: PHYSICAL THERAPY | Facility: OTHER | Age: 34
End: 2024-12-10
Attending: FAMILY MEDICINE
Payer: COMMERCIAL

## 2024-12-10 DIAGNOSIS — M25.371 RIGHT ANKLE INSTABILITY: Primary | ICD-10-CM

## 2024-12-10 PROCEDURE — 97110 THERAPEUTIC EXERCISES: CPT | Mod: GP

## 2024-12-10 PROCEDURE — 97112 NEUROMUSCULAR REEDUCATION: CPT | Mod: GP

## 2024-12-17 ENCOUNTER — THERAPY VISIT (OUTPATIENT)
Dept: PHYSICAL THERAPY | Facility: OTHER | Age: 34
End: 2024-12-17
Attending: FAMILY MEDICINE
Payer: COMMERCIAL

## 2024-12-17 DIAGNOSIS — M25.371 RIGHT ANKLE INSTABILITY: Primary | ICD-10-CM

## 2024-12-17 PROCEDURE — 97110 THERAPEUTIC EXERCISES: CPT | Mod: GP

## 2024-12-17 PROCEDURE — 97112 NEUROMUSCULAR REEDUCATION: CPT | Mod: GP

## 2024-12-31 ENCOUNTER — THERAPY VISIT (OUTPATIENT)
Dept: PHYSICAL THERAPY | Facility: OTHER | Age: 34
End: 2024-12-31
Attending: FAMILY MEDICINE
Payer: COMMERCIAL

## 2024-12-31 DIAGNOSIS — M25.371 RIGHT ANKLE INSTABILITY: Primary | ICD-10-CM

## 2024-12-31 PROCEDURE — 97112 NEUROMUSCULAR REEDUCATION: CPT | Mod: GP

## 2024-12-31 PROCEDURE — 97110 THERAPEUTIC EXERCISES: CPT | Mod: GP

## 2025-01-07 ENCOUNTER — THERAPY VISIT (OUTPATIENT)
Dept: PHYSICAL THERAPY | Facility: OTHER | Age: 35
End: 2025-01-07
Attending: FAMILY MEDICINE
Payer: COMMERCIAL

## 2025-01-07 DIAGNOSIS — M25.371 RIGHT ANKLE INSTABILITY: Primary | ICD-10-CM

## 2025-01-07 PROCEDURE — 97112 NEUROMUSCULAR REEDUCATION: CPT | Mod: GP

## 2025-01-07 PROCEDURE — 97110 THERAPEUTIC EXERCISES: CPT | Mod: GP

## 2025-01-07 PROCEDURE — 97140 MANUAL THERAPY 1/> REGIONS: CPT | Mod: GP

## 2025-03-11 ENCOUNTER — MYC REFILL (OUTPATIENT)
Dept: FAMILY MEDICINE | Facility: OTHER | Age: 35
End: 2025-03-11
Payer: COMMERCIAL

## 2025-03-11 DIAGNOSIS — F33.9 EPISODE OF RECURRENT MAJOR DEPRESSIVE DISORDER, UNSPECIFIED DEPRESSION EPISODE SEVERITY: ICD-10-CM

## 2025-03-11 DIAGNOSIS — F41.9 ANXIETY: ICD-10-CM

## 2025-03-12 ENCOUNTER — MYC MEDICAL ADVICE (OUTPATIENT)
Dept: FAMILY MEDICINE | Facility: OTHER | Age: 35
End: 2025-03-12
Payer: COMMERCIAL

## 2025-03-12 DIAGNOSIS — F41.9 ANXIETY: ICD-10-CM

## 2025-03-12 DIAGNOSIS — F33.9 EPISODE OF RECURRENT MAJOR DEPRESSIVE DISORDER, UNSPECIFIED DEPRESSION EPISODE SEVERITY: ICD-10-CM

## 2025-03-12 NOTE — TELEPHONE ENCOUNTER
Tried bumping up Prozac but is back down to 20 mg daily. Needs refill of 20 mg until upcoming appt.     Rocael'd up order.     Routing to provider to review and respond.  Ghada Hobson RN on 3/12/2025 at 2:48 PM

## 2025-03-12 NOTE — TELEPHONE ENCOUNTER
FLUoxetine (PROZAC) 20 MG capsule 90 capsule 3 5/23/2024 -- No   Sig - Route: Take 1 capsule (20 mg) by mouth daily - Oral   Sent to pharmacy as: FLUoxetine HCl 20 MG Oral Capsule (PROzac)   Class: E-Prescribe   Order: [id number]   E-Prescribing Status: Receipt confirmed by pharmacy (5/23/2024  9:01 AM CDT)     [facility] - [place], [place] - [address]     Pt informed of prescription via [employer]. Brittaney Taylor RN .............. 3/12/2025  2:28 PM

## 2025-03-22 ASSESSMENT — ANXIETY QUESTIONNAIRES
IF YOU CHECKED OFF ANY PROBLEMS ON THIS QUESTIONNAIRE, HOW DIFFICULT HAVE THESE PROBLEMS MADE IT FOR YOU TO DO YOUR WORK, TAKE CARE OF THINGS AT HOME, OR GET ALONG WITH OTHER PEOPLE: NOT DIFFICULT AT ALL
7. FEELING AFRAID AS IF SOMETHING AWFUL MIGHT HAPPEN: NOT AT ALL
GAD7 TOTAL SCORE: 4
1. FEELING NERVOUS, ANXIOUS, OR ON EDGE: SEVERAL DAYS
3. WORRYING TOO MUCH ABOUT DIFFERENT THINGS: NOT AT ALL
6. BECOMING EASILY ANNOYED OR IRRITABLE: SEVERAL DAYS
4. TROUBLE RELAXING: SEVERAL DAYS
5. BEING SO RESTLESS THAT IT IS HARD TO SIT STILL: SEVERAL DAYS
2. NOT BEING ABLE TO STOP OR CONTROL WORRYING: NOT AT ALL

## 2025-03-25 ENCOUNTER — OFFICE VISIT (OUTPATIENT)
Dept: FAMILY MEDICINE | Facility: OTHER | Age: 35
End: 2025-03-25
Attending: FAMILY MEDICINE
Payer: COMMERCIAL

## 2025-03-25 VITALS
SYSTOLIC BLOOD PRESSURE: 128 MMHG | TEMPERATURE: 97.4 F | WEIGHT: 235 LBS | BODY MASS INDEX: 34.7 KG/M2 | OXYGEN SATURATION: 98 % | RESPIRATION RATE: 18 BRPM | DIASTOLIC BLOOD PRESSURE: 70 MMHG | HEART RATE: 76 BPM

## 2025-03-25 DIAGNOSIS — F41.9 ANXIETY: ICD-10-CM

## 2025-03-25 DIAGNOSIS — F33.9 EPISODE OF RECURRENT MAJOR DEPRESSIVE DISORDER, UNSPECIFIED DEPRESSION EPISODE SEVERITY: ICD-10-CM

## 2025-03-25 LAB — VIT D+METAB SERPL-MCNC: 27 NG/ML (ref 20–50)

## 2025-03-25 PROCEDURE — 36415 COLL VENOUS BLD VENIPUNCTURE: CPT | Mod: ZL | Performed by: FAMILY MEDICINE

## 2025-03-25 PROCEDURE — 82306 VITAMIN D 25 HYDROXY: CPT | Mod: ZL | Performed by: FAMILY MEDICINE

## 2025-03-25 RX ORDER — BUPROPION HYDROCHLORIDE 150 MG/1
150 TABLET ORAL EVERY MORNING
Qty: 90 TABLET | Refills: 3 | Status: SHIPPED | OUTPATIENT
Start: 2025-03-25

## 2025-03-25 ASSESSMENT — ANXIETY QUESTIONNAIRES
5. BEING SO RESTLESS THAT IT IS HARD TO SIT STILL: MORE THAN HALF THE DAYS
1. FEELING NERVOUS, ANXIOUS, OR ON EDGE: MORE THAN HALF THE DAYS
GAD7 TOTAL SCORE: 10
4. TROUBLE RELAXING: SEVERAL DAYS
GAD7 TOTAL SCORE: 10
7. FEELING AFRAID AS IF SOMETHING AWFUL MIGHT HAPPEN: SEVERAL DAYS
3. WORRYING TOO MUCH ABOUT DIFFERENT THINGS: SEVERAL DAYS
6. BECOMING EASILY ANNOYED OR IRRITABLE: MORE THAN HALF THE DAYS
7. FEELING AFRAID AS IF SOMETHING AWFUL MIGHT HAPPEN: SEVERAL DAYS
2. NOT BEING ABLE TO STOP OR CONTROL WORRYING: SEVERAL DAYS
GAD7 TOTAL SCORE: 10
IF YOU CHECKED OFF ANY PROBLEMS ON THIS QUESTIONNAIRE, HOW DIFFICULT HAVE THESE PROBLEMS MADE IT FOR YOU TO DO YOUR WORK, TAKE CARE OF THINGS AT HOME, OR GET ALONG WITH OTHER PEOPLE: SOMEWHAT DIFFICULT
8. IF YOU CHECKED OFF ANY PROBLEMS, HOW DIFFICULT HAVE THESE MADE IT FOR YOU TO DO YOUR WORK, TAKE CARE OF THINGS AT HOME, OR GET ALONG WITH OTHER PEOPLE?: SOMEWHAT DIFFICULT

## 2025-03-25 ASSESSMENT — PATIENT HEALTH QUESTIONNAIRE - PHQ9
SUM OF ALL RESPONSES TO PHQ QUESTIONS 1-9: 12
SUM OF ALL RESPONSES TO PHQ QUESTIONS 1-9: 12

## 2025-03-25 ASSESSMENT — ENCOUNTER SYMPTOMS: NERVOUS/ANXIOUS: 1

## 2025-03-25 ASSESSMENT — PAIN SCALES - GENERAL: PAINLEVEL_OUTOF10: NO PAIN (0)

## 2025-03-25 NOTE — NURSING NOTE
Chief Complaint   Patient presents with    Depression    Anxiety         Medication Reconciliation: complete    Stephania Mcfarlane, LPN

## 2025-03-25 NOTE — PROGRESS NOTES
Nursing Notes:   Stephania Mcfarlane LPN  3/25/2025  9:30 AM  Signed  Chief Complaint   Patient presents with    Depression    Anxiety         Medication Reconciliation: complete    Stephania Mcfarlane LPN      Palak Durand is a 34 year old, presenting for the following health issues:  Depression and Anxiety        3/25/2025     9:15 AM   Additional Questions   Roomed by Stephania Mcfarlane     Kizzy is here today for discussion of depression & anxiety.  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  She is working full time.  She has 3 small children, who also keep her busy.  She has been on fluoxetine for a while, which has certainly helped decrease her anxiety, but not her depression.  She is feeling her mood has been lower lately.  Maybe a seasonal component with the time of year being late winter.  Has a hard time finding erika in life.  Finding difficulty with motivation as well.  She is considering decreasing work by a 1/2 day per week.  No suicidal ideation.        4/30/2024    10:10 AM 3/22/2025     4:51 PM 3/25/2025     9:07 AM   CASEY-7 SCORE   Total Score 0 (minimal anxiety)  10 (moderate anxiety)   Total Score 0 4  10        Patient-reported           3/18/2024    10:25 AM 4/29/2024    10:18 AM 3/25/2025     8:08 AM   PHQ   PHQ-9 Total Score 0 1 12    Q9: Thoughts of better off dead/self-harm past 2 weeks Not at all Not at all Not at all       Patient-reported         History of Present Illness       Mental Health Follow-up:  Patient presents to follow-up on Depression & Anxiety.Patient's depression since last visit has been:  Worse  The patient is not having other symptoms associated with depression.    The patient is not having other symptoms associated with anxiety.  Any significant life events: No  Patient is not feeling anxious or having panic attacks.  Patient has no concerns about alcohol or drug use.    She eats 2-3 servings of fruits and vegetables daily.She consumes 0 sweetened  beverage(s) daily.She exercises with enough effort to increase her heart rate 9 or less minutes per day.  She exercises with enough effort to increase her heart rate 3 or less days per week.   She is taking medications regularly.          Depression and Anxiety   How are you doing with your depression since your last visit? Worsened   How are you doing with your anxiety since your last visit?  Improved   Are you having other symptoms that might be associated with depression or anxiety? Yes, see above.  Have you had a significant life event? Job Concerns   Do you have any concerns with your use of alcohol or other drugs? No    Social History     Tobacco Use    Smoking status: Never    Smokeless tobacco: Never   Vaping Use    Vaping status: Never Used   Substance Use Topics    Alcohol use: Not Currently     Alcohol/week: 2.0 standard drinks of alcohol     Types: 2 Glasses of wine per week     Comment: 2 glass wine a week    Drug use: Never         3/18/2024    10:25 AM 4/29/2024    10:18 AM 3/25/2025     8:08 AM   PHQ   PHQ-9 Total Score 0 1 12    Q9: Thoughts of better off dead/self-harm past 2 weeks Not at all Not at all Not at all       Patient-reported         4/30/2024    10:10 AM 3/22/2025     4:51 PM 3/25/2025     9:07 AM   CASEY-7 SCORE   Total Score 0 (minimal anxiety)  10 (moderate anxiety)   Total Score 0 4  10        Patient-reported           Review of Systems  Constitutional, HEENT, cardiovascular, pulmonary, GI, , musculoskeletal, neuro, skin, endocrine and psych systems are negative, except as otherwise noted.      Objective    /70   Pulse 76   Temp 97.4  F (36.3  C) (Tympanic)   Resp 18   Wt 106.6 kg (235 lb)   SpO2 98%   Breastfeeding No   BMI 34.70 kg/m    Body mass index is 34.7 kg/m .  Physical Exam  Constitutional:       Appearance: Normal appearance.   HENT:      Head: Normocephalic.   Eyes:      Extraocular Movements: Extraocular movements intact.      Pupils: Pupils are equal,  round, and reactive to light.   Cardiovascular:      Rate and Rhythm: Regular rhythm.   Neurological:      Mental Status: She is alert.   Psychiatric:         Mood and Affect: Mood normal.         Behavior: Behavior normal.         Thought Content: Thought content normal.         Judgment: Judgment normal.                      ICD-10-CM    1. Anxiety  F41.9 buPROPion (WELLBUTRIN XL) 150 MG 24 hr tablet     Vitamin D Total     FLUoxetine (PROZAC) 20 MG capsule     Vitamin D Total     DISCONTINUED: FLUoxetine (PROZAC) 20 MG capsule      2. Episode of recurrent major depressive disorder, unspecified depression episode severity  F33.9 buPROPion (WELLBUTRIN XL) 150 MG 24 hr tablet     Vitamin D Total     FLUoxetine (PROZAC) 20 MG capsule     Vitamin D Total     DISCONTINUED: FLUoxetine (PROZAC) 20 MG capsule           She feels her anxiety is better with increased dose of fluoxetine 40 mg daily, but feels she still has considerable depression symptoms.  Interested in adding Wellbutrin  mg daily dahlia Fluoxetine 40 mg daily.  She is seeing a therapist virtually.  She has an appointment for an upcoming physical in a month and will reassess at that time.  Vitamin D level obtained today as well.  See #1.    No follow-ups on file.     The longitudinal plan of care for the diagnosis(es)/condition(s) as documented were addressed during this visit. Due to the added complexity in care, I will continue to support Kizzy in the subsequent management and with ongoing continuity of care.      Lenora Osborne MD

## 2025-03-26 ASSESSMENT — ANXIETY QUESTIONNAIRES: GAD7 TOTAL SCORE: 10

## 2025-04-27 SDOH — HEALTH STABILITY: PHYSICAL HEALTH: ON AVERAGE, HOW MANY DAYS PER WEEK DO YOU ENGAGE IN MODERATE TO STRENUOUS EXERCISE (LIKE A BRISK WALK)?: 2 DAYS

## 2025-04-27 SDOH — HEALTH STABILITY: PHYSICAL HEALTH: ON AVERAGE, HOW MANY MINUTES DO YOU ENGAGE IN EXERCISE AT THIS LEVEL?: 20 MIN

## 2025-04-27 ASSESSMENT — SOCIAL DETERMINANTS OF HEALTH (SDOH): HOW OFTEN DO YOU GET TOGETHER WITH FRIENDS OR RELATIVES?: ONCE A WEEK

## 2025-04-29 ENCOUNTER — OFFICE VISIT (OUTPATIENT)
Dept: FAMILY MEDICINE | Facility: OTHER | Age: 35
End: 2025-04-29
Attending: FAMILY MEDICINE
Payer: COMMERCIAL

## 2025-04-29 VITALS
RESPIRATION RATE: 16 BRPM | TEMPERATURE: 97.1 F | BODY MASS INDEX: 34.45 KG/M2 | DIASTOLIC BLOOD PRESSURE: 84 MMHG | HEIGHT: 69 IN | OXYGEN SATURATION: 98 % | WEIGHT: 232.6 LBS | SYSTOLIC BLOOD PRESSURE: 130 MMHG | HEART RATE: 97 BPM

## 2025-04-29 DIAGNOSIS — Z13.0 SCREENING FOR DEFICIENCY ANEMIA: ICD-10-CM

## 2025-04-29 DIAGNOSIS — Z00.00 ROUTINE GENERAL MEDICAL EXAMINATION AT A HEALTH CARE FACILITY: Primary | ICD-10-CM

## 2025-04-29 DIAGNOSIS — Z13.29 SCREENING FOR THYROID DISORDER: ICD-10-CM

## 2025-04-29 DIAGNOSIS — E66.811 CLASS 1 OBESITY WITH BODY MASS INDEX (BMI) OF 34.0 TO 34.9 IN ADULT, UNSPECIFIED OBESITY TYPE, UNSPECIFIED WHETHER SERIOUS COMORBIDITY PRESENT: ICD-10-CM

## 2025-04-29 DIAGNOSIS — F33.9 EPISODE OF RECURRENT MAJOR DEPRESSIVE DISORDER, UNSPECIFIED DEPRESSION EPISODE SEVERITY: ICD-10-CM

## 2025-04-29 DIAGNOSIS — Z13.220 SCREENING FOR LIPID DISORDERS: ICD-10-CM

## 2025-04-29 DIAGNOSIS — F41.9 ANXIETY: ICD-10-CM

## 2025-04-29 DIAGNOSIS — Z13.1 SCREENING FOR DIABETES MELLITUS: ICD-10-CM

## 2025-04-29 LAB
ALBUMIN SERPL BCG-MCNC: 4.4 G/DL (ref 3.5–5.2)
ALP SERPL-CCNC: 73 U/L (ref 40–150)
ALT SERPL W P-5'-P-CCNC: 16 U/L (ref 0–50)
ANION GAP SERPL CALCULATED.3IONS-SCNC: 9 MMOL/L (ref 7–15)
AST SERPL W P-5'-P-CCNC: 15 U/L (ref 0–45)
BASOPHILS # BLD AUTO: 0.1 10E3/UL (ref 0–0.2)
BASOPHILS NFR BLD AUTO: 1 %
BILIRUB SERPL-MCNC: 0.3 MG/DL
BUN SERPL-MCNC: 14.5 MG/DL (ref 6–20)
CALCIUM SERPL-MCNC: 9.3 MG/DL (ref 8.8–10.4)
CHLORIDE SERPL-SCNC: 106 MMOL/L (ref 98–107)
CHOLEST SERPL-MCNC: 144 MG/DL
CREAT SERPL-MCNC: 0.8 MG/DL (ref 0.51–0.95)
EGFRCR SERPLBLD CKD-EPI 2021: >90 ML/MIN/1.73M2
EOSINOPHIL # BLD AUTO: 0.1 10E3/UL (ref 0–0.7)
EOSINOPHIL NFR BLD AUTO: 2 %
ERYTHROCYTE [DISTWIDTH] IN BLOOD BY AUTOMATED COUNT: 12 % (ref 10–15)
EST. AVERAGE GLUCOSE BLD GHB EST-MCNC: 105 MG/DL
FASTING STATUS PATIENT QL REPORTED: NO
FASTING STATUS PATIENT QL REPORTED: NO
GLUCOSE SERPL-MCNC: 99 MG/DL (ref 70–99)
HBA1C MFR BLD: 5.3 %
HCO3 SERPL-SCNC: 23 MMOL/L (ref 22–29)
HCT VFR BLD AUTO: 41 % (ref 35–47)
HDLC SERPL-MCNC: 43 MG/DL
HGB BLD-MCNC: 13.7 G/DL (ref 11.7–15.7)
IMM GRANULOCYTES # BLD: 0 10E3/UL
IMM GRANULOCYTES NFR BLD: 0 %
LDLC SERPL CALC-MCNC: 88 MG/DL
LYMPHOCYTES # BLD AUTO: 2.8 10E3/UL (ref 0.8–5.3)
LYMPHOCYTES NFR BLD AUTO: 40 %
MCH RBC QN AUTO: 29.3 PG (ref 26.5–33)
MCHC RBC AUTO-ENTMCNC: 33.4 G/DL (ref 31.5–36.5)
MCV RBC AUTO: 88 FL (ref 78–100)
MONOCYTES # BLD AUTO: 0.5 10E3/UL (ref 0–1.3)
MONOCYTES NFR BLD AUTO: 7 %
NEUTROPHILS # BLD AUTO: 3.4 10E3/UL (ref 1.6–8.3)
NEUTROPHILS NFR BLD AUTO: 50 %
NONHDLC SERPL-MCNC: 101 MG/DL
NRBC # BLD AUTO: 0 10E3/UL
NRBC BLD AUTO-RTO: 0 /100
PLATELET # BLD AUTO: 244 10E3/UL (ref 150–450)
POTASSIUM SERPL-SCNC: 4.2 MMOL/L (ref 3.4–5.3)
PROT SERPL-MCNC: 7.1 G/DL (ref 6.4–8.3)
RBC # BLD AUTO: 4.68 10E6/UL (ref 3.8–5.2)
SODIUM SERPL-SCNC: 138 MMOL/L (ref 135–145)
TRIGL SERPL-MCNC: 67 MG/DL
TSH SERPL DL<=0.005 MIU/L-ACNC: 2.43 UIU/ML (ref 0.3–4.2)
WBC # BLD AUTO: 6.8 10E3/UL (ref 4–11)

## 2025-04-29 PROCEDURE — 36415 COLL VENOUS BLD VENIPUNCTURE: CPT | Mod: ZL | Performed by: FAMILY MEDICINE

## 2025-04-29 PROCEDURE — 83036 HEMOGLOBIN GLYCOSYLATED A1C: CPT | Mod: ZL | Performed by: FAMILY MEDICINE

## 2025-04-29 PROCEDURE — 80061 LIPID PANEL: CPT | Mod: ZL | Performed by: FAMILY MEDICINE

## 2025-04-29 PROCEDURE — 82310 ASSAY OF CALCIUM: CPT | Mod: ZL | Performed by: FAMILY MEDICINE

## 2025-04-29 PROCEDURE — 85004 AUTOMATED DIFF WBC COUNT: CPT | Mod: ZL | Performed by: FAMILY MEDICINE

## 2025-04-29 PROCEDURE — 84443 ASSAY THYROID STIM HORMONE: CPT | Mod: ZL | Performed by: FAMILY MEDICINE

## 2025-04-29 RX ORDER — METFORMIN HYDROCHLORIDE 500 MG/1
500 TABLET, EXTENDED RELEASE ORAL
Qty: 90 TABLET | Refills: 3 | Status: SHIPPED | OUTPATIENT
Start: 2025-04-29

## 2025-04-29 RX ORDER — TRIAMCINOLONE ACETONIDE 0.25 MG/G
CREAM TOPICAL
COMMUNITY
Start: 2025-04-27

## 2025-04-29 ASSESSMENT — PAIN SCALES - GENERAL: PAINLEVEL_OUTOF10: NO PAIN (0)

## 2025-04-29 NOTE — PROGRESS NOTES
Preventive Care Visit  [facility]  Lenora Osborne MD, Family Medicine  Apr 29, 2025        Palak Durand is a 34 year old, presenting for the following:  Physical        4/29/2025     9:06 AM   Additional Questions   Roomed by Stephania Mcfarlane        Kizzy is here today for an annual wellness visit.  She has been doing well with the addition of Wellbutrin  mg daily to the Fluoxetine 40 mg daily she had previously been taking.  She feels that this combination is working well for her.          4/30/2024    10:10 AM 3/22/2025     4:51 PM 3/25/2025     9:07 AM   CASEY-7 SCORE   Total Score 0 (minimal anxiety)  10 (moderate anxiety)   Total Score 0 4  10        Patient-reported           4/29/2024    10:18 AM 3/25/2025     8:08 AM 4/29/2025     8:02 AM   PHQ   PHQ-9 Total Score 1 12  3    Q9: Thoughts of better off dead/self-harm past 2 weeks Not at all Not at all Not at all       Patient-reported         Advance Care Planning    Discussed advance care planning with patient; however, patient declined at this time.        4/27/2025   General Health   How would you rate your overall physical health? Good   Feel stress (tense, anxious, or unable to sleep) Only a little   (!) STRESS CONCERN      4/27/2025   Nutrition   Three or more servings of calcium each day? Yes   Diet: Regular (no restrictions)   How many servings of fruit and vegetables per day? (!) 2-3   How many sweetened beverages each day? 0-1         4/27/2025   Exercise   Days per week of moderate/strenous exercise 2 days   Average minutes spent exercising at this level 20 min   (!) EXERCISE CONCERN      4/27/2025   Social Factors   Frequency of gathering with friends or relatives Once a week   Worry food won't last until get money to buy more No   Food not last or not have enough money for food? No   Do you have housing? (Housing is defined as stable permanent housing and does not include staying outside in a car, in a tent,  in an abandoned building, in an overnight shelter, or couch-surfing.) Yes   Are you worried about losing your housing? No   Lack of transportation? No   Unable to get utilities (heat,electricity)? No         4/27/2025   Dental   Dentist two times every year? Yes       Today's PHQ-9 Score:       4/29/2025     8:02 AM   PHQ-9 SCORE   PHQ-9 Total Score MyChart 3 (Minimal depression)   PHQ-9 Total Score 3        Patient-reported         4/27/2025   Substance Use   Alcohol more than 3/day or more than 7/wk No   Do you use any other substances recreationally? No     Social History     Tobacco Use    Smoking status: Never    Smokeless tobacco: Never   Vaping Use    Vaping status: Never Used   Substance Use Topics    Alcohol use: Not Currently     Alcohol/week: 2.0 standard drinks of alcohol     Types: 2 Glasses of wine per week     Comment: 2 glass wine a week    Drug use: Never           12/9/2024   LAST FHS-7 RESULTS   1st degree relative breast or ovarian cancer Yes           She had her last mammogram in December.  She will be due for her usual screening in June.  Her mom and MGM both had a history of breast cancer.              4/27/2025   STI Screening   New sexual partner(s) since last STI/HIV test? No     History of abnormal Pap smear: No - age 30- 64 PAP with HPV every 5 years recommended             4/27/2025   Contraception/Family Planning   Questions about contraception or family planning No        Reviewed and updated as needed this visit by Provider                    Past Medical History:   Diagnosis Date    Anxiety     Asthma     exercise induced as child    Carrier of group B Streptococcus     Complication of anesthesia     Family history of malignant neoplasm of breast     mom and grandma; q6 month alternating mammo and MRI after done nursing    Hypertension     Gestational HTN     Past Surgical History:   Procedure Laterality Date    CLOSED REDUCTION DISTAL RADIUS FRACTURE  03/26/2005    COLONOSCOPY   01/09/2017     BP Readings from Last 3 Encounters:   04/29/25 130/84   03/25/25 128/70   10/08/24 136/74    Wt Readings from Last 3 Encounters:   04/29/25 105.5 kg (232 lb 9.6 oz)   03/25/25 106.6 kg (235 lb)   10/08/24 107.1 kg (236 lb 3.2 oz)                  Patient Active Problem List   Diagnosis    Generalized anxiety disorder    Irritable bowel syndrome with diarrhea    Fear of flying    Disorder of refraction and accommodation    Myopia    Other acne    White coat syndrome without hypertension    History of miscarriage    Gestational (pregnancy-induced) hypertension without significant proteinuria, complicating childbirth    Anxiety state    Failed moderate sedation during procedure    History of gestational hypertension    Recurrent pregnancy loss    Family history of breast cancer in mother    Encounter for supervision of other normal pregnancy in third trimester    Chronic hypertension affecting pregnancy    Right ankle instability     Past Surgical History:   Procedure Laterality Date    CLOSED REDUCTION DISTAL RADIUS FRACTURE  03/26/2005    COLONOSCOPY  01/09/2017       Social History     Tobacco Use    Smoking status: Never    Smokeless tobacco: Never   Substance Use Topics    Alcohol use: Not Currently     Alcohol/week: 2.0 standard drinks of alcohol     Types: 2 Glasses of wine per week     Comment: 2 glass wine a week     Family History   Problem Relation Age of Onset    Other - See Comments Mother         Ophthalmic Disease    Breast Cancer Mother 54    Hypertension Mother     Hyperlipidemia Father     No Known Problems Sister     Anxiety Disorder Brother     No Known Problems Brother     Other - See Comments Maternal Grandmother         Ophthalmic Disease    Breast Cancer Maternal Grandmother         77    Other - See Comments Maternal Grandfather         Ophthalmic Disease    Hyperlipidemia Maternal Grandfather     Hypertension Maternal Grandfather     GI problems Maternal Grandfather     Heart  "Disease Maternal Grandfather     Parkinsonism Paternal Grandmother     Cancer Paternal Grandfather     Cerebrovascular Disease Paternal Grandfather     No Known Problems Daughter     No Known Problems Son     Celiac Disease Paternal Aunt     Celiac Disease Cousin         paternal         Current Outpatient Medications   Medication Sig Dispense Refill    buPROPion (WELLBUTRIN XL) 150 MG 24 hr tablet Take 1 tablet (150 mg) by mouth every morning. 90 tablet 3    FLUoxetine (PROZAC) 20 MG capsule Take 2 capsules (40 mg) by mouth daily. 180 capsule 3    metFORMIN (GLUCOPHAGE XR) 500 MG 24 hr tablet Take 1 tablet (500 mg) by mouth daily (with dinner). 90 tablet 3    triamcinolone (KENALOG) 0.025 % cream       Vitamin D3 (CHOLECALCIFEROL) 25 mcg (1000 units) tablet        Allergies   Allergen Reactions    Ceftriaxone Rash     Had a rash possibly from this injection 9-09    Nickel Rash    Penicillins Rash         Review of Systems  Constitutional, HEENT, cardiovascular, pulmonary, GI, , musculoskeletal, neuro, skin, endocrine and psych systems are negative, except as otherwise noted.     Objective    Exam  /84   Pulse 97   Temp 97.1  F (36.2  C) (Tympanic)   Resp 16   Ht 1.753 m (5' 9\")   Wt 105.5 kg (232 lb 9.6 oz)   LMP  (LMP Unknown)   SpO2 98%   Breastfeeding No   BMI 34.35 kg/m     Estimated body mass index is 34.35 kg/m  as calculated from the following:    Height as of this encounter: 1.753 m (5' 9\").    Weight as of this encounter: 105.5 kg (232 lb 9.6 oz).    Physical Exam  Constitutional:       Appearance: Normal appearance. She is well-developed.   HENT:      Head: Normocephalic.      Right Ear: Tympanic membrane and external ear normal.      Left Ear: Tympanic membrane and external ear normal.      Nose: Nose normal.      Mouth/Throat:      Mouth: Mucous membranes are moist.      Pharynx: No oropharyngeal exudate or posterior oropharyngeal erythema.   Eyes:      Extraocular Movements: " Extraocular movements intact.      Pupils: Pupils are equal, round, and reactive to light.   Neck:      Thyroid: No thyromegaly.   Cardiovascular:      Rate and Rhythm: Normal rate and regular rhythm.      Heart sounds: Normal heart sounds. No murmur heard.  Pulmonary:      Effort: Pulmonary effort is normal. No respiratory distress.      Breath sounds: Normal breath sounds. No wheezing or rales.      Comments: Breast exam declined.  Chest:      Chest wall: No tenderness.   Abdominal:      General: Bowel sounds are normal. There is no distension.      Palpations: Abdomen is soft. There is no mass.      Tenderness: There is no abdominal tenderness. There is no guarding or rebound.   Musculoskeletal:         General: No tenderness or deformity.      Cervical back: Normal range of motion and neck supple.   Lymphadenopathy:      Cervical: No cervical adenopathy.   Skin:     General: Skin is warm and dry.   Neurological:      Mental Status: She is alert and oriented to person, place, and time.      Cranial Nerves: No cranial nerve deficit.      Coordination: Coordination normal.   Psychiatric:         Mood and Affect: Mood normal.         Behavior: Behavior normal.           ICD-10-CM    1. Routine general medical examination at a health care facility  Z00.00       2. Screening for diabetes mellitus  Z13.1 Comprehensive metabolic panel     Hemoglobin A1c      3. Screening for lipid disorders  Z13.220 Lipid Profile      4. Screening for thyroid disorder  Z13.29 TSH with free T4 reflex      5. Screening for deficiency anemia  Z13.0 CBC with Platelets & Differential      6. Class 1 obesity with body mass index (BMI) of 34.0 to 34.9 in adult, unspecified obesity type, unspecified whether serious comorbidity present  E66.811 metFORMIN (GLUCOPHAGE XR) 500 MG 24 hr tablet    Z68.34       7. Anxiety  F41.9       8. Episode of recurrent major depressive disorder, unspecified depression episode severity  F33.9            Pap Smear  last completed in 7/2021 and was normal.  Will plan to repeat this next year.  Mammogram will be due in June.  She will schedule this once reminder received through imaging dept.  Tdap, flu and covid vaccines are up to date.  Labs ordered as above.  Labs ordered as above.  Labs as above.  Labs as above.  Metformin XR refilled.    Stable.  On Fluoxetine and Wellbutrin XL.  Stable.  On Fluoxetine and Wellbutrin XL.    Return in about 53 weeks (around 5/5/2026) for Annual Wellness Visit.       The longitudinal plan of care for the diagnosis(es)/condition(s) as documented were addressed during this visit. Due to the added complexity in care, I will continue to support Kizzy in the subsequent management and with ongoing continuity of care.    Lenora Osborne MD

## 2025-04-29 NOTE — PATIENT INSTRUCTIONS
Patient Education   Preventive Care Advice   This is general advice given by our system to help you stay healthy. However, your care team may have specific advice just for you. Please talk to your care team about your preventive care needs.  Nutrition  Eat 5 or more servings of fruits and vegetables each day.  Try wheat bread, brown rice and whole grain pasta (instead of white bread, rice, and pasta).  Get enough calcium and vitamin D. Check the label on foods and aim for 100% of the RDA (recommended daily allowance).  Lifestyle  Exercise at least 150 minutes each week  (30 minutes a day, 5 days a week).  Do muscle strengthening activities 2 days a week. These help control your weight and prevent disease.  No smoking.  Wear sunscreen to prevent skin cancer.  Have a dental exam and cleaning every 6 months.  Yearly exams  See your health care team every year to talk about:  Any changes in your health.  Any medicines your care team has prescribed.  Preventive care, family planning, and ways to prevent chronic diseases.  Shots (vaccines)   HPV shots (up to age 26), if you've never had them before.  Hepatitis B shots (up to age 59), if you've never had them before.  COVID-19 shot: Get this shot when it's due.  Flu shot: Get a flu shot every year.  Tetanus shot: Get a tetanus shot every 10 years.  Pneumococcal, hepatitis A, and RSV shots: Ask your care team if you need these based on your risk.  Shingles shot (for age 50 and up)  General health tests  Diabetes screening:  Starting at age 35, Get screened for diabetes at least every 3 years.  If you are younger than age 35, ask your care team if you should be screened for diabetes.  Cholesterol test: At age 39, start having a cholesterol test every 5 years, or more often if advised.  Bone density scan (DEXA): At age 50, ask your care team if you should have this scan for osteoporosis (brittle bones).  Hepatitis C: Get tested at least once in your life.  STIs (sexually  transmitted infections)  Before age 24: Ask your care team if you should be screened for STIs.  After age 24: Get screened for STIs if you're at risk. You are at risk for STIs (including HIV) if:  You are sexually active with more than one person.  You don't use condoms every time.  You or a partner was diagnosed with a sexually transmitted infection.  If you are at risk for HIV, ask about PrEP medicine to prevent HIV.  Get tested for HIV at least once in your life, whether you are at risk for HIV or not.  Cancer screening tests  Cervical cancer screening: If you have a cervix, begin getting regular cervical cancer screening tests starting at age 21.  Breast cancer scan (mammogram): If you've ever had breasts, begin having regular mammograms starting at age 40. This is a scan to check for breast cancer.  Colon cancer screening: It is important to start screening for colon cancer at age 45.  Have a colonoscopy test every 10 years (or more often if you're at risk) Or, ask your provider about stool tests like a FIT test every year or Cologuard test every 3 years.  To learn more about your testing options, visit:   .  For help making a decision, visit:   https://bit.ly/ja95641.  Prostate cancer screening test: If you have a prostate, ask your care team if a prostate cancer screening test (PSA) at age 55 is right for you.  Lung cancer screening: If you are a current or former smoker ages 50 to 80, ask your care team if ongoing lung cancer screenings are right for you.  For informational purposes only. Not to replace the advice of your health care provider. Copyright   2023 [employer]. All rights reserved. Clinically reviewed by the [facility] Transitions Program. [facility] EHR [id number] - REV 01/24.

## 2025-04-29 NOTE — NURSING NOTE
Chief Complaint   Patient presents with    Physical         Medication Reconciliation: complete    Stephania Mcfarlane, LPN

## 2025-05-12 ENCOUNTER — OFFICE VISIT (OUTPATIENT)
Dept: FAMILY MEDICINE | Facility: OTHER | Age: 35
End: 2025-05-12
Payer: COMMERCIAL

## 2025-05-12 VITALS
OXYGEN SATURATION: 97 % | WEIGHT: 229 LBS | RESPIRATION RATE: 16 BRPM | BODY MASS INDEX: 33.92 KG/M2 | SYSTOLIC BLOOD PRESSURE: 122 MMHG | HEART RATE: 84 BPM | DIASTOLIC BLOOD PRESSURE: 82 MMHG | HEIGHT: 69 IN | TEMPERATURE: 97.4 F

## 2025-05-12 DIAGNOSIS — R58: Primary | ICD-10-CM

## 2025-05-12 ASSESSMENT — PAIN SCALES - GENERAL: PAINLEVEL_OUTOF10: NO PAIN (0)

## 2025-05-12 NOTE — PROGRESS NOTES
"  Assessment & Plan   Problem List Items Addressed This Visit    None  Visit Diagnoses         Rupture of blood vessel    -  Primary    Relevant Orders    US Venous Competency Right    Vascular Medicine Referral           Patient presents today with concerns of a vein that spontaneously ruptured two days ago. She has had a superficial vein on left calf since childhood; no varicosity. She was playing soccer when there was acute spontaneous onset of right calf pain with apparent rupture of the superficial vein; there was localized bruising and tenderness. On exam VSS, there is ecchymosis and tenderness to medial aspect of right lower extremity. History and exam consistent with a ruptured vein. Recommend continued conservative therapies including compression and elevation. US of affected extremity was ordered for further evaluation. Will refer to vascular as well. Patient is agreeable to plan and verbalizes understanding.      BMI  Estimated body mass index is 33.82 kg/m  as calculated from the following:    Height as of this encounter: 1.753 m (5' 9\").    Weight as of this encounter: 103.9 kg (229 lb).       Palak Durand is a 34 year old, presenting for the following health issues:  Trauma (Right Lower Leg Pain x 1-3 Days )        5/12/2025    12:45 PM   Additional Questions   Roomed by Marge BRICSOE LPN   Accompanied by N/A     Trauma    History of Present Illness       Reason for visit:  Left calf pain/varicose vein  Symptom onset:  1-3 days ago  Symptoms include:  Pain, swelling, bruising  Symptom intensity:  Moderate  Symptom progression:  Staying the same  Had these symptoms before:  No  What makes it worse:  Walking, jumping, running  What makes it better:  Ice, elevation, compression   She is taking medications regularly.      Patient presents today with concerns of a vein that spontaneously ruptured two days ago. She has had a superficial vein on right calf since childhood; no varicosity. She was playing " "soccer when there was acute spontaneous onset of right calf pain with apparent rupture of the superficial vein; there was localized bruising and tenderness. She denies any trauma or injury to the area. She notes that pain has been ongoing, improved with compression, ice, elevation. Pain with walking, running, and jumping.       Review of Systems  Constitutional, HEENT, cardiovascular, pulmonary, gi and gu systems are negative, except as otherwise noted.      Objective    /82 (BP Location: Right arm, Patient Position: Chair, Cuff Size: Adult Large)   Pulse 84   Temp 97.4  F (36.3  C) (Temporal)   Resp 16   Ht 1.753 m (5' 9\")   Wt 103.9 kg (229 lb)   LMP  (LMP Unknown)   SpO2 97%   Breastfeeding No   BMI 33.82 kg/m    Body mass index is 33.82 kg/m .  Physical Exam   GENERAL: alert and no distress  EYES: Eyes grossly normal to inspection  NECK: no masses, or scars  RESP: lungs clear to auscultation - no rales, rhonchi or wheezes  MS: no gross musculoskeletal defects noted, no edema  SKIN: no suspicious lesions or rashes and ecchymoses - lower legs; ecchymosis and tenderness to medial aspect of right lower extremity.   NEURO: Normal strength and tone, mentation intact and speech normal  PSYCH: mentation appears normal, affect normal/bright        Signed Electronically by: NEVIN YAÑEZ CNP    "

## 2025-06-13 ENCOUNTER — TELEPHONE (OUTPATIENT)
Dept: ULTRASOUND IMAGING | Facility: OTHER | Age: 35
End: 2025-06-13
Payer: COMMERCIAL

## 2025-06-13 NOTE — TELEPHONE ENCOUNTER
Vick Chicas,  I spoke with patient and she does not want to proceed with US any longer.   BETTY ALONZO, NEVIN CNP on 6/13/2025 at 4:07 PM

## 2025-06-13 NOTE — TELEPHONE ENCOUNTER
Cristina Burnett,     I see your order for US venous competency of the Right leg, I just want to make sure this isn't a stepping stone for possible vein ablation down the road. Or if it is just work up for the ruptured vessel..    Thanks for your time!       Aviva Dumont RN   Diagnostic Imaging  [facility]  [phone]

## 2025-06-16 ENCOUNTER — HOSPITAL ENCOUNTER (OUTPATIENT)
Dept: ULTRASOUND IMAGING | Facility: OTHER | Age: 35
Discharge: HOME | End: 2025-06-16
Attending: FAMILY MEDICINE | Admitting: RADIOLOGY
Payer: COMMERCIAL

## 2025-06-16 DIAGNOSIS — Z09 FOLLOW-UP EXAM, 3-6 MONTHS SINCE PREVIOUS EXAM: ICD-10-CM

## 2025-06-16 DIAGNOSIS — R92.8 ABNORMAL FINDING ON BREAST IMAGING: ICD-10-CM

## 2025-06-16 PROCEDURE — 76642 ULTRASOUND BREAST LIMITED: CPT | Mod: LT

## 2025-06-16 PROCEDURE — 76642 ULTRASOUND BREAST LIMITED: CPT | Mod: 26 | Performed by: RADIOLOGY

## 2025-06-24 ENCOUNTER — HOSPITAL ENCOUNTER (OUTPATIENT)
Dept: MRI IMAGING | Facility: OTHER | Age: 35
Discharge: HOME | End: 2025-06-24
Attending: SURGERY
Payer: COMMERCIAL

## 2025-06-24 DIAGNOSIS — Z12.39 SCREENING FOR BREAST CANCER USING NON-MAMMOGRAM MODALITY: ICD-10-CM

## 2025-06-24 DIAGNOSIS — Z91.89 AT HIGH RISK FOR BREAST CANCER: ICD-10-CM

## 2025-06-24 DIAGNOSIS — Z80.3 FAMILY HISTORY OF BREAST CANCER: ICD-10-CM

## 2025-06-24 PROCEDURE — 255N000002 HC RX 255 OP 636: Performed by: SURGERY

## 2025-06-24 PROCEDURE — A9575 INJ GADOTERATE MEGLUMI 0.1ML: HCPCS | Performed by: SURGERY

## 2025-06-24 PROCEDURE — 77049 MRI BREAST C-+ W/CAD BI: CPT

## 2025-06-24 PROCEDURE — 77049 MRI BREAST C-+ W/CAD BI: CPT | Mod: 26 | Performed by: RADIOLOGY

## 2025-06-24 RX ORDER — GADOTERATE MEGLUMINE 376.9 MG/ML
20 INJECTION INTRAVENOUS ONCE
Status: COMPLETED | OUTPATIENT
Start: 2025-06-24 | End: 2025-06-24

## 2025-06-24 RX ADMIN — GADOTERATE MEGLUMINE 20 ML: 376.9 INJECTION INTRAVENOUS at 12:03

## 2025-07-30 ENCOUNTER — TELEPHONE (OUTPATIENT)
Dept: VASCULAR SURGERY | Facility: CLINIC | Age: 35
End: 2025-07-30
Payer: COMMERCIAL

## 2025-07-30 DIAGNOSIS — R58: Primary | ICD-10-CM

## 2025-08-05 ENCOUNTER — TELEPHONE (OUTPATIENT)
Dept: ULTRASOUND IMAGING | Facility: OTHER | Age: 35
End: 2025-08-05
Payer: COMMERCIAL

## 2025-08-18 ENCOUNTER — OFFICE VISIT (OUTPATIENT)
Dept: FAMILY MEDICINE | Facility: OTHER | Age: 35
End: 2025-08-18
Payer: COMMERCIAL

## 2025-08-18 DIAGNOSIS — Z20.9 EXPOSURE TO BAT WITHOUT KNOWN BITE: Primary | ICD-10-CM

## 2025-08-18 PROCEDURE — 96372 THER/PROPH/DIAG INJ SC/IM: CPT

## 2025-08-18 PROCEDURE — 250N000011 HC RX IP 250 OP 636: Mod: JZ

## 2025-08-18 PROCEDURE — 90375 RABIES IG IM/SC: CPT | Mod: JZ

## 2025-08-18 RX ADMIN — RABIES IMMUNE GLOBULIN (HUMAN) 2100 UNITS: 300 INJECTION, SOLUTION INFILTRATION; INTRAMUSCULAR at 14:20

## 2025-08-21 ENCOUNTER — ALLIED HEALTH/NURSE VISIT (OUTPATIENT)
Dept: FAMILY MEDICINE | Facility: OTHER | Age: 35
End: 2025-08-21
Attending: FAMILY MEDICINE
Payer: COMMERCIAL

## 2025-08-21 DIAGNOSIS — Z20.9 EXPOSURE TO BAT WITHOUT KNOWN BITE: ICD-10-CM

## 2025-08-21 DIAGNOSIS — Z23 ENCOUNTER FOR IMMUNIZATION: Primary | ICD-10-CM

## 2025-08-25 ENCOUNTER — HOSPITAL ENCOUNTER (OUTPATIENT)
Dept: ULTRASOUND IMAGING | Facility: OTHER | Age: 35
Discharge: HOME | End: 2025-08-25
Attending: SURGERY
Payer: COMMERCIAL

## 2025-08-25 ENCOUNTER — ALLIED HEALTH/NURSE VISIT (OUTPATIENT)
Dept: FAMILY MEDICINE | Facility: OTHER | Age: 35
End: 2025-08-25
Attending: FAMILY MEDICINE
Payer: COMMERCIAL

## 2025-08-25 DIAGNOSIS — R58: ICD-10-CM

## 2025-08-25 DIAGNOSIS — Z20.9 EXPOSURE TO BAT WITHOUT KNOWN BITE: Primary | ICD-10-CM

## 2025-08-25 DIAGNOSIS — Z23 NEED FOR VACCINATION: ICD-10-CM

## 2025-08-25 PROCEDURE — 93971 EXTREMITY STUDY: CPT | Mod: RT

## 2025-08-25 PROCEDURE — 93971 EXTREMITY STUDY: CPT | Mod: 26 | Performed by: STUDENT IN AN ORGANIZED HEALTH CARE EDUCATION/TRAINING PROGRAM

## 2025-09-02 ENCOUNTER — ALLIED HEALTH/NURSE VISIT (OUTPATIENT)
Dept: FAMILY MEDICINE | Facility: OTHER | Age: 35
End: 2025-09-02
Attending: FAMILY MEDICINE
Payer: COMMERCIAL

## 2025-09-02 DIAGNOSIS — Z20.9 EXPOSURE TO BAT WITHOUT KNOWN BITE: Primary | ICD-10-CM
